# Patient Record
Sex: FEMALE | Race: WHITE | NOT HISPANIC OR LATINO | Employment: UNEMPLOYED | URBAN - METROPOLITAN AREA
[De-identification: names, ages, dates, MRNs, and addresses within clinical notes are randomized per-mention and may not be internally consistent; named-entity substitution may affect disease eponyms.]

---

## 2018-09-30 ENCOUNTER — OFFICE VISIT (OUTPATIENT)
Dept: URGENT CARE | Facility: CLINIC | Age: 23
End: 2018-09-30
Payer: COMMERCIAL

## 2018-09-30 VITALS
RESPIRATION RATE: 18 BRPM | HEIGHT: 64 IN | TEMPERATURE: 98.9 F | WEIGHT: 185 LBS | BODY MASS INDEX: 31.58 KG/M2 | DIASTOLIC BLOOD PRESSURE: 67 MMHG | SYSTOLIC BLOOD PRESSURE: 116 MMHG | OXYGEN SATURATION: 99 % | HEART RATE: 100 BPM

## 2018-09-30 DIAGNOSIS — B34.9 VIRAL SYNDROME: Primary | ICD-10-CM

## 2018-09-30 PROCEDURE — 99213 OFFICE O/P EST LOW 20 MIN: CPT | Performed by: EMERGENCY MEDICINE

## 2018-09-30 RX ORDER — LEVOTHYROXINE SODIUM 112 UG/1
112 TABLET ORAL DAILY
COMMUNITY

## 2018-09-30 RX ORDER — QUETIAPINE FUMARATE 200 MG/1
250 TABLET, FILM COATED ORAL
COMMUNITY

## 2018-09-30 RX ORDER — ALBUTEROL SULFATE 90 UG/1
2 AEROSOL, METERED RESPIRATORY (INHALATION) EVERY 6 HOURS PRN
COMMUNITY
End: 2018-10-10 | Stop reason: SDUPTHER

## 2018-09-30 RX ORDER — ONDANSETRON 4 MG/1
4 TABLET, FILM COATED ORAL EVERY 8 HOURS PRN
Qty: 20 TABLET | Refills: 0 | Status: SHIPPED | OUTPATIENT
Start: 2018-09-30 | End: 2019-11-13

## 2018-09-30 RX ORDER — NORGESTIMATE AND ETHINYL ESTRADIOL 0.25-0.035
1 KIT ORAL DAILY
COMMUNITY

## 2018-09-30 RX ORDER — CITALOPRAM 20 MG/1
40 TABLET ORAL DAILY
COMMUNITY

## 2018-09-30 RX ORDER — CETIRIZINE HYDROCHLORIDE 10 MG/1
10 TABLET ORAL DAILY
COMMUNITY
End: 2019-11-13

## 2018-09-30 RX ORDER — PREDNISONE 10 MG/1
TABLET ORAL
Qty: 27 TABLET | Refills: 0 | Status: SHIPPED | OUTPATIENT
Start: 2018-09-30 | End: 2018-10-10

## 2018-09-30 NOTE — PROGRESS NOTES
[facility]        NAME: Franki Su is a 25 y o  female  : 1995    MRN: [id number]  DATE: 2018  TIME: 10:46 AM    Assessment and Plan   Viral syndrome [B34 9]  1  Viral syndrome  ondansetron (ZOFRAN) 4 mg tablet    predniSONE 10 mg tablet         Patient Instructions     Patient Instructions   You have been diagnosed with a Viral Syndrome infection and your symptoms should resolve over the next 7 to 10 days with the treatments recommended today  If they do not, it is possible that you have developed a bacterial infection and you should return  If you were to take the antibiotic while you are still in the viral stage, you will not get better any faster, but could kill off the good germs in your body as well as make the germs in you resistant to the antibiotic  Take an expectorant - guaifenesin should be the only ingredient - during the day, and the cough suppressant (ex  Robitussin DM or Tessalon) if needed at night only  Take Zinc 12 5 to 15 mg every 2 - 3 hrs while awake for the next few days  You may take Cold Kevin (13 3 mg of Zinc) or split a 25 mg Zinc tablet or lozenge in two or a 50 mg into four to get the proper dose  The total daily dose of Zinc should exceed 75 mg per day  Viral Syndrome   AMBULATORY CARE:   Viral syndrome  is a term used for general symptoms of a viral infection that has no clear cause  Viruses are spread easily from person to person through the air and on shared items  Common symptoms include the following:   · Fever and chills    · A runny or stuffy nose     · Cough, sore throat, or hoarseness     · Headache, or pain and pressure around your eyes     · Muscle aches and joint pain     · Shortness of breath or wheezing     · Abdominal pain, cramps, and diarrhea     · Nausea, vomiting, or loss of appetite  Call 911 for the following:   · You have a seizure  · You cannot be woken  · You have chest pain or trouble breathing    Seek care immediately if:   · You have a stiff neck, a bad headache, and sensitivity to light  · You feel weak, dizzy, or confused  · You stop urinating or urinate a lot less than normal      · You cough up blood or thick, yellow or green, mucus  · You have severe abdominal pain or your abdomen is larger than usual   Contact your healthcare provider if:   · Your symptoms do not get better with treatment, or get worse, after 3 days  · You have a rash or ear pain  · You have burning when you urinate  · You have questions or concerns about your condition or care  Treatment for viral syndrome  may include medicines to manage your symptoms  You may  need any of the following:  · Acetaminophen  decreases pain and fever  It is available without a doctor's order  Ask how much medicine to take and how often to take it  Follow directions  Acetaminophen can cause liver damage if not taken correctly  · NSAIDs , such as ibuprofen, help decrease swelling, pain, and fever  NSAIDs can cause stomach bleeding or kidney problems in certain people  If you take blood thinner medicine, always ask your healthcare provider if NSAIDs are safe for you  Always read the medicine label and follow directions  · Cold medicine  helps decrease swelling, control a cough, and relieve chest or nasal congestion  · Saline nasal spray  helps decrease nasal congestion  · Take your medicine as directed  Contact your healthcare provider if you think your medicine is not helping or if you have side effects  Tell him of her if you are allergic to any medicine  Keep a list of the medicines, vitamins, and herbs you take  Include the amounts, and when and why you take them  Bring the list or the pill bottles to follow-up visits  Carry your medicine list with you in case of an emergency  Manage your symptoms:   · Drink liquids as directed  to prevent dehydration   Ask how much liquid to drink each day and which liquids are best for you  Ask if you should drink an oral rehydration solution (ORS)  An ORS has the right amounts of water, salts, and sugar you need to replace body fluids  This may help prevent dehydration caused by vomiting or diarrhea  Do not drink liquids with caffeine  Drinks with caffeine can make dehydration worse  · Get plenty of rest  to help your body heal  Take naps throughout the day  Ask your healthcare provider when you can return to work and your normal activities  · Use a cool mist humidifier  to help you breathe easier if you have nasal or chest congestion  Ask your healthcare provider how to use a cool mist humidifier  · Eat honey or use cough drops  to help decrease throat discomfort  Ask your healthcare provider how much honey you should eat each day  Cough drops are available without a doctor's order  Follow directions for taking cough drops  · Do not smoke and stay away from others who smoke  Nicotine and other chemicals in cigarettes and cigars can cause lung damage  Smoking can also delay healing  Ask your healthcare provider for information if you currently smoke and need help to quit  E-cigarettes or smokeless tobacco still contain nicotine  Talk to your healthcare provider before you use these products  · Wash your hands frequently  to prevent the spread of germs to others  Use soap and water  Use gel hand  when soap and water are not available  Wash your hands after you use the bathroom, cough, or sneeze  Wash your hands before you prepare or eat food  Follow up with your healthcare provider as directed:  Write down your questions so you remember to ask them during your visits  © 2017 [employer] [employer] Information is for End User's use only and may not be sold, redistributed or otherwise used for commercial purposes  All illustrations and images included in CareNotes® are the copyrighted property of [employer]  or Jason Delong    The above information is an  only  It is not intended as medical advice for individual conditions or treatments  Talk to your doctor, nurse or pharmacist before following any medical regimen to see if it is safe and effective for you  Follow up with PCP in 3-5 days  Proceed to  ER if symptoms worsen  Chief Complaint     Chief Complaint   Patient presents with    Vomiting     vomiting and diarrhea for 1 week with cough         History of Present Illness       Patient with cough congestion for the past week also with nausea vomiting diarrhea on and off for the past week  Review of Systems   Review of Systems   Constitutional: Negative for chills and fever  HENT: Positive for congestion, rhinorrhea, sinus pressure and sore throat  Negative for trouble swallowing and voice change  Respiratory: Positive for cough  Negative for chest tightness, shortness of breath and wheezing  Cardiovascular: Negative for chest pain  Gastrointestinal: Positive for diarrhea, nausea and vomiting  Negative for abdominal distention, abdominal pain and blood in stool  Skin: Negative for rash  Neurological: Negative for headaches           Current Medications       Current Outpatient Prescriptions:     albuterol (PROVENTIL HFA,VENTOLIN HFA) 90 mcg/act inhaler, Inhale 2 puffs every 6 (six) hours as needed for wheezing, Disp: , Rfl:     cetirizine (ZyrTEC) 10 mg tablet, Take 10 mg by mouth daily, Disp: , Rfl:     citalopram (CeleXA) 40 mg tablet, Take 40 mg by mouth daily, Disp: , Rfl:     levothyroxine 100 mcg tablet, Take 100 mcg by mouth daily, Disp: , Rfl:     norgestimate-ethinyl estradiol (ORTHO-CYCLEN) 0 25-35 MG-MCG per tablet, Take 1 tablet by mouth daily, Disp: , Rfl:     QUEtiapine (SEROquel) 100 mg tablet, Take 100 mg by mouth daily at bedtime, Disp: , Rfl:     ondansetron (ZOFRAN) 4 mg tablet, Take 1 tablet (4 mg total) by mouth every 8 (eight) hours as needed for nausea or vomiting, Disp: 20 tablet, Rfl: 0    predniSONE 10 mg tablet, Take once daily all days pills on this schedule 6- 6- 5- 4- 3- 2- 1, Disp: 27 tablet, Rfl: 0    Current Allergies     Allergies as of 09/30/2018    (No Known Allergies)            The following portions of the patient's history were reviewed and updated as appropriate: allergies, current medications, past family history, past medical history, past social history, past surgical history and problem list      Past Medical History:   Diagnosis Date    Asthma     Bipolar 1 disorder ([facility] [address] )     Depression     Disease of thyroid gland     Nerve disorder        Past Surgical History:   Procedure Laterality Date    WISDOM TOOTH EXTRACTION         No family history on file  Medications have been verified  Objective   /67   Pulse 100   Temp 98 9 °F (37 2 °C) (Tympanic)   Resp 18   Ht 5' 4" (1 626 m)   Wt 83 9 kg (185 lb)   SpO2 99%   BMI 31 76 kg/m²        Physical Exam     Physical Exam   Constitutional: She is oriented to person, place, and time  She appears well-developed and well-nourished  No distress  HENT:   Head: Normocephalic and atraumatic  Right Ear: Tympanic membrane and external ear normal    Left Ear: Tympanic membrane and external ear normal    Nose: Mucosal edema present  Mouth/Throat: Posterior oropharyngeal erythema present  No oropharyngeal exudate or tonsillar abscesses  Neck: Neck supple  Cardiovascular: Normal rate and regular rhythm  Pulmonary/Chest: Effort normal    Abdominal: Soft  Bowel sounds are normal  She exhibits no distension and no mass  There is no tenderness  There is no rebound and no guarding  Neurological: She is alert and oriented to person, place, and time  Skin: Skin is warm and dry  No rash noted  No pallor  Nursing note and vitals reviewed

## 2018-09-30 NOTE — PATIENT INSTRUCTIONS
You have been diagnosed with a Viral Syndrome infection and your symptoms should resolve over the next 7 to 10 days with the treatments recommended today  If they do not, it is possible that you have developed a bacterial infection and you should return  If you were to take the antibiotic while you are still in the viral stage, you will not get better any faster, but could kill off the good germs in your body as well as make the germs in you resistant to the antibiotic  Take an expectorant - guaifenesin should be the only ingredient - during the day, and the cough suppressant (ex  Robitussin DM or Tessalon) if needed at night only  Take Zinc 12 5 to 15 mg every 2 - 3 hrs while awake for the next few days  You may take Cold Kevin (13 3 mg of Zinc) or split a 25 mg Zinc tablet or lozenge in two or a 50 mg into four to get the proper dose  The total daily dose of Zinc should exceed 75 mg per day  Viral Syndrome   AMBULATORY CARE:   Viral syndrome  is a term used for general symptoms of a viral infection that has no clear cause  Viruses are spread easily from person to person through the air and on shared items  Common symptoms include the following:   · Fever and chills    · A runny or stuffy nose     · Cough, sore throat, or hoarseness     · Headache, or pain and pressure around your eyes     · Muscle aches and joint pain     · Shortness of breath or wheezing     · Abdominal pain, cramps, and diarrhea     · Nausea, vomiting, or loss of appetite  Call 911 for the following:   · You have a seizure  · You cannot be woken  · You have chest pain or trouble breathing  Seek care immediately if:   · You have a stiff neck, a bad headache, and sensitivity to light  · You feel weak, dizzy, or confused  · You stop urinating or urinate a lot less than normal      · You cough up blood or thick, yellow or green, mucus       · You have severe abdominal pain or your abdomen is larger than usual   Contact your healthcare provider if:   · Your symptoms do not get better with treatment, or get worse, after 3 days  · You have a rash or ear pain  · You have burning when you urinate  · You have questions or concerns about your condition or care  Treatment for viral syndrome  may include medicines to manage your symptoms  You may  need any of the following:  · Acetaminophen  decreases pain and fever  It is available without a doctor's order  Ask how much medicine to take and how often to take it  Follow directions  Acetaminophen can cause liver damage if not taken correctly  · NSAIDs , such as ibuprofen, help decrease swelling, pain, and fever  NSAIDs can cause stomach bleeding or kidney problems in certain people  If you take blood thinner medicine, always ask your healthcare provider if NSAIDs are safe for you  Always read the medicine label and follow directions  · Cold medicine  helps decrease swelling, control a cough, and relieve chest or nasal congestion  · Saline nasal spray  helps decrease nasal congestion  · Take your medicine as directed  Contact your healthcare provider if you think your medicine is not helping or if you have side effects  Tell him of her if you are allergic to any medicine  Keep a list of the medicines, vitamins, and herbs you take  Include the amounts, and when and why you take them  Bring the list or the pill bottles to follow-up visits  Carry your medicine list with you in case of an emergency  Manage your symptoms:   · Drink liquids as directed  to prevent dehydration  Ask how much liquid to drink each day and which liquids are best for you  Ask if you should drink an oral rehydration solution (ORS)  An ORS has the right amounts of water, salts, and sugar you need to replace body fluids  This may help prevent dehydration caused by vomiting or diarrhea  Do not drink liquids with caffeine  Drinks with caffeine can make dehydration worse       · Get plenty of rest  to help your body heal  Take naps throughout the day  Ask your healthcare provider when you can return to work and your normal activities  · Use a cool mist humidifier  to help you breathe easier if you have nasal or chest congestion  Ask your healthcare provider how to use a cool mist humidifier  · Eat honey or use cough drops  to help decrease throat discomfort  Ask your healthcare provider how much honey you should eat each day  Cough drops are available without a doctor's order  Follow directions for taking cough drops  · Do not smoke and stay away from others who smoke  Nicotine and other chemicals in cigarettes and cigars can cause lung damage  Smoking can also delay healing  Ask your healthcare provider for information if you currently smoke and need help to quit  E-cigarettes or smokeless tobacco still contain nicotine  Talk to your healthcare provider before you use these products  · Wash your hands frequently  to prevent the spread of germs to others  Use soap and water  Use gel hand  when soap and water are not available  Wash your hands after you use the bathroom, cough, or sneeze  Wash your hands before you prepare or eat food  Follow up with your healthcare provider as directed:  Write down your questions so you remember to ask them during your visits  © 2017 [facility] [employer] Information is for End User's use only and may not be sold, redistributed or otherwise used for commercial purposes  All illustrations and images included in CareNotes® are the copyrighted property of [employer]  or Jason Delong  The above information is an  only  It is not intended as medical advice for individual conditions or treatments  Talk to your doctor, nurse or pharmacist before following any medical regimen to see if it is safe and effective for you

## 2018-10-10 ENCOUNTER — OFFICE VISIT (OUTPATIENT)
Dept: URGENT CARE | Facility: CLINIC | Age: 23
End: 2018-10-10
Payer: COMMERCIAL

## 2018-10-10 VITALS
WEIGHT: 187 LBS | HEART RATE: 112 BPM | TEMPERATURE: 98 F | OXYGEN SATURATION: 95 % | SYSTOLIC BLOOD PRESSURE: 123 MMHG | HEIGHT: 64 IN | BODY MASS INDEX: 31.92 KG/M2 | RESPIRATION RATE: 16 BRPM | DIASTOLIC BLOOD PRESSURE: 78 MMHG

## 2018-10-10 DIAGNOSIS — J40 BRONCHITIS: Primary | ICD-10-CM

## 2018-10-10 PROCEDURE — 99213 OFFICE O/P EST LOW 20 MIN: CPT | Performed by: PHYSICIAN ASSISTANT

## 2018-10-10 RX ORDER — DOXYCYCLINE 100 MG/1
100 TABLET ORAL 2 TIMES DAILY
Qty: 20 TABLET | Refills: 0 | Status: SHIPPED | OUTPATIENT
Start: 2018-10-10 | End: 2018-10-20

## 2018-10-10 RX ORDER — FLUTICASONE PROPIONATE 110 UG/1
2 AEROSOL, METERED RESPIRATORY (INHALATION) AS NEEDED
COMMUNITY

## 2018-10-10 RX ORDER — ALBUTEROL SULFATE 90 UG/1
2 AEROSOL, METERED RESPIRATORY (INHALATION) EVERY 4 HOURS PRN
Qty: 1 INHALER | Refills: 0 | Status: SHIPPED | OUTPATIENT
Start: 2018-10-10 | End: 2018-10-20

## 2018-10-10 NOTE — LETTER
October 10, 2018     Patient: Merrill Gonzalez   YOB: 1995   Date of Visit: 10/10/2018       To Whom It May Concern: It is my medical opinion that Merrill Gonzalez may return to work on evening of 10/12/2018       If you have any questions or concerns, please don't hesitate to call           Sincerely,        HUBERT VELAZQUEZ    CC: No Recipients

## 2018-10-10 NOTE — PROGRESS NOTES
[facility] [employer] [facility]        NAME: Mela Abbott is a 25 y o  female  : 1995    MRN: [id number]  DATE: October 10, 2018  TIME: 8:45 AM    Assessment and Plan   Bronchitis [J40]  1  Bronchitis       Patient Instructions     Increase dose of flovent to two puffs twice a day  Take antibiotic as prescribed  Albuterol inhaler [id number]  Follow up with PCP in 3-5 days  Proceed to  ER if symptoms worsen  Chief Complaint     Chief Complaint   Patient presents with    Sinusitis     c/o severe sinus congestion  coughing from post nasal drip  has been sick for 2 weeks  seen on  and dx viral syndrome  feeling worse  History of Present Illness       Cough   This is a new problem  The current episode started yesterday  The problem has been gradually worsening  The problem occurs every few minutes  The cough is non-productive  Associated symptoms include nasal congestion, rhinorrhea, a sore throat and wheezing  Pertinent negatives include no chest pain, chills, ear congestion, ear pain, fever, headaches, heartburn, hemoptysis, myalgias, postnasal drip, rash, shortness of breath, sweats or weight loss  Nothing aggravates the symptoms  She has tried nothing for the symptoms  There is no history of asthma, bronchiectasis, bronchitis, COPD, emphysema, environmental allergies or pneumonia  Review of Systems   Review of Systems   Constitutional: Negative for chills, fever and weight loss  HENT: Positive for rhinorrhea, sinus pressure and sore throat  Negative for ear pain and postnasal drip  Respiratory: Positive for cough, chest tightness and wheezing  Negative for hemoptysis and shortness of breath  Cardiovascular: Negative for chest pain  Gastrointestinal: Negative for heartburn  Musculoskeletal: Negative for myalgias  Skin: Negative for rash  Allergic/Immunologic: Negative for environmental allergies  Neurological: Negative for headaches           Current Medications       Current Outpatient Prescriptions:     albuterol (PROVENTIL HFA,VENTOLIN HFA) 90 mcg/act inhaler, Inhale 2 puffs every 6 (six) hours as needed for wheezing, Disp: , Rfl:     cetirizine (ZyrTEC) 10 mg tablet, Take 10 mg by mouth daily, Disp: , Rfl:     citalopram (CeleXA) 40 mg tablet, Take 40 mg by mouth daily, Disp: , Rfl:     fluticasone (FLOVENT HFA) 110 MCG/ACT inhaler, Inhale 2 puffs 2 (two) times a day Rinse mouth after use , Disp: , Rfl:     levothyroxine 100 mcg tablet, Take 100 mcg by mouth daily, Disp: , Rfl:     norgestimate-ethinyl estradiol (ORTHO-CYCLEN) 0 25-35 MG-MCG per tablet, Take 1 tablet by mouth daily, Disp: , Rfl:     ondansetron (ZOFRAN) 4 mg tablet, Take 1 tablet (4 mg total) by mouth every 8 (eight) hours as needed for nausea or vomiting, Disp: 20 tablet, Rfl: 0    QUEtiapine (SEROquel) 100 mg tablet, Take 100 mg by mouth daily at bedtime, Disp: , Rfl:     Current Allergies     Allergies as of 10/10/2018    (No Known Allergies)            The following portions of the patient's history were reviewed and updated as appropriate: allergies, current medications, past family history, past medical history, past social history, past surgical history and problem list      Past Medical History:   Diagnosis Date    Asthma     Bipolar 1 disorder ([address] )     Depression     Disease of thyroid gland     Nerve disorder        Past Surgical History:   Procedure Laterality Date    WISDOM TOOTH EXTRACTION         Family History   Problem Relation Age of Onset    Pneumonia Mother     Multiple sclerosis Father          Medications have been verified  Objective   /78   Pulse (!) 112   Temp 98 °F (36 7 °C) (Tympanic)   Resp 16   Ht 5' 4" (1 626 m)   Wt 84 8 kg (187 lb)   SpO2 95%   BMI 32 10 kg/m²        Physical Exam     Physical Exam   Constitutional: She appears well-developed and well-nourished  HENT:   Head: Normocephalic     Right Ear: External ear normal    Left Ear: External ear normal    Nose: Mucosal edema and rhinorrhea present  Mouth/Throat: Posterior oropharyngeal edema and posterior oropharyngeal erythema present  No oropharyngeal exudate  Cardiovascular: Normal rate, regular rhythm, normal heart sounds and intact distal pulses  Exam reveals no gallop and no friction rub  No murmur heard  Pulmonary/Chest: Effort normal  No respiratory distress  She has no decreased breath sounds  She has wheezes  She has no rhonchi  She has no rales  Abdominal: Soft  Bowel sounds are normal  She exhibits no distension  There is no tenderness  Lymphadenopathy:     She has cervical adenopathy  Right cervical: Superficial cervical adenopathy present  Left cervical: Superficial cervical adenopathy present

## 2019-06-26 ENCOUNTER — APPOINTMENT (OUTPATIENT)
Dept: RADIOLOGY | Facility: MEDICAL CENTER | Age: 24
End: 2019-06-26
Payer: COMMERCIAL

## 2019-06-26 ENCOUNTER — OFFICE VISIT (OUTPATIENT)
Dept: OBGYN CLINIC | Facility: CLINIC | Age: 24
End: 2019-06-26
Payer: COMMERCIAL

## 2019-06-26 VITALS
WEIGHT: 189 LBS | HEIGHT: 64 IN | DIASTOLIC BLOOD PRESSURE: 74 MMHG | BODY MASS INDEX: 32.27 KG/M2 | SYSTOLIC BLOOD PRESSURE: 104 MMHG | HEART RATE: 128 BPM

## 2019-06-26 DIAGNOSIS — S83.004A CLOSED PATELLAR DISLOCATION, RIGHT, INITIAL ENCOUNTER: Primary | ICD-10-CM

## 2019-06-26 DIAGNOSIS — M25.561 ACUTE PAIN OF RIGHT KNEE: ICD-10-CM

## 2019-06-26 PROCEDURE — 73562 X-RAY EXAM OF KNEE 3: CPT

## 2019-06-26 PROCEDURE — 99203 OFFICE O/P NEW LOW 30 MIN: CPT | Performed by: ORTHOPAEDIC SURGERY

## 2019-07-08 ENCOUNTER — EVALUATION (OUTPATIENT)
Dept: PHYSICAL THERAPY | Facility: CLINIC | Age: 24
End: 2019-07-08
Payer: COMMERCIAL

## 2019-07-08 DIAGNOSIS — R26.2 DIFFICULTY WALKING: ICD-10-CM

## 2019-07-08 DIAGNOSIS — M25.561 ACUTE PAIN OF RIGHT KNEE: ICD-10-CM

## 2019-07-08 DIAGNOSIS — S83.004A CLOSED PATELLAR DISLOCATION, RIGHT, INITIAL ENCOUNTER: Primary | ICD-10-CM

## 2019-07-08 PROCEDURE — 97535 SELF CARE MNGMENT TRAINING: CPT | Performed by: PHYSICAL THERAPIST

## 2019-07-08 PROCEDURE — 97162 PT EVAL MOD COMPLEX 30 MIN: CPT | Performed by: PHYSICAL THERAPIST

## 2019-07-08 NOTE — LETTER
2019    Anne-Marie Garcia MD  [address], [address]    Patient: Renea Florentino   YOB: 1995   Date of Visit: 2019     Encounter Diagnosis     ICD-10-CM    1  Closed patellar dislocation, right, initial encounter O07 924D Ambulatory referral to PT/OT hand therapy   2  Difficulty walking R26 2    3  Acute pain of right knee M25 561        Dear Dr Tashia Mckeon:    Please review the attached Plan of Care from [facility] recent visit  Please verify that you agree therapy should continue by signing the attached document and sending it back to our office  If you have any questions or concerns, please don't hesitate to call  Sincerely,    Marquis Mensah, PT      Referring Provider:      I certify that I have read the below Plan of Care and certify the need for these services furnished under this plan of treatment while under my care  Anne-Marie Garcia MD  [address] [place] [address]  [facility]IA [facility]          PT Evaluation   Today's date: 2019  Patient name: Reena Florentino  : 1995  MRN: [id number]  Referring provider: Brian Conner MD  Dx:   Encounter Diagnosis     ICD-10-CM    1  Closed patellar dislocation, right, initial encounter N29 [id number] Ambulatory referral to PT/OT hand therapy   2  Difficulty walking R26 2    3  Acute pain of right knee M25 561      Assessment  Assessment details: Patient states her right leg will just give out  Her patella acts up and she can almost fall from the pain  Impairments: abnormal gait, abnormal or restricted ROM, abnormal movement, activity intolerance, impaired balance, impaired physical strength, pain with function and safety issue  Understanding of Dx/Px/POC: excellent   Prognosis: fair    Goals  STG 2-4 weeks:    Increase B LE strength 2-5 lbs  Decrease pain by 1-2 levels on 1-10 pain scale  Increase standing/walking tolerance to >30 minutes  Patient independent with HEP    LTG 6-8 weeks:   Increase B LE strength 10-20 lbs  Decrease pain to 0-2/10 with activity  Increase single leg stance >30 seconds  Increase standing/walking tolerance to >90 minutes  Increase range of motion to normal   Improve gait pattern, coordination and balance to normal   D/C with HEP  Plan  Patient would benefit from: PT eval and skilled physical therapy  Planned modality interventions: ultrasound, TENS and unattended electrical stimulation  Planned therapy interventions: joint mobilization, manual therapy, balance, balance/weight bearing training, neuromuscular re-education, patient education, postural training, prosthetic fitting/training, self care, strengthening, stretching, therapeutic activities, therapeutic exercise, therapeutic training, transfer training, home exercise program, graded exercise, flexibility and coordination  Frequency: 3x week  Duration in weeks: 6  Treatment plan discussed with: patient      Subjective Evaluation    Pain  Quality: discomfort, knife-like, pulling, squeezing, sharp, radiating, throbbing and tight    Treatments  Current treatment: physical therapy  Patient Goals  Patient goals for therapy: decreased pain, improved balance, increased motion, return to work, return to [employer] activities, independence with ADLs/IADLs and increased strength        Objective    Date of onset:  07/04/2019    Date of Surgery:  None    History of Present Episode: 7/8/2019  Christiano العليbryce states that at age 15 she apparently dislocated her right knee patella  She has had issues ever since this incident  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  Past Medical History:    7/8/2019  Waco Maxine reports chronic right knee / patellar issues  Asthma, Hypothyroid  Back Sx Lumbar L4-5  Previous Level of Functional Ability:  7/8/2019  Christiano Goodman states her right knee was fine until it dislocated  Inspection / Palpation:  Knee:  7/8/2019  Mesomorphic / Endomorphic body type  No signs of infection  No signs of wounds  No signs of drainage  No signs of ecchymotic regions  No signs of erythremic regions  Moderate signs of muscle spasm  Moderate signs of muscle guarding  Moderate signs of tenderness reported to palpation  No signs of swelling  Positive signs of a surgery site with her lumbar region  Current conditions appears consistent with recent acute episode  Chief Complaints:  7/8/2019  Sheridan Jo reports moderate difficulty with standing  Sheridan Jo reports moderate difficulty with walking  Sheridan Jo reports moderate difficulty with movement / use of her right knee  Sheridan Jo reports moderate difficulty with use of stairs  Sheridan Jo reports severe difficulty with running  Sheridan Jo reports severe difficulty with jumping  Sheridan Jo reports moderate difficulty with use of inclines  Sheridan Jo reports mild difficulty with sleeping  Sheridan Jo reports moderate difficulty with her strength and endurance  Sheridan Jo reports mild to moderate limitations with her range of motion  Sheridan Jo reports mild difficulty lying on her right knee region  KNEE PAIN Resting Moving Palpation Standing Walking   7/8/2019 Lt 0 0 0 0 0   7/8/2019 Rt 3-4 3-5 5-8 3-4 6-8     KNEE PAIN Running Stairs Sleeping Twisting Jumping   7/8/2019 Lt 0 0 0 0 0   7/8/2019 Rt 8-10 5-8 1-3 9-10 9-10     KNEE AROM Flexion Extension SLR   7/8/2019 Lt 140° 0° 90°   7/8/2019 Rt 140° 0° 90°     KNEE MMT Flexion Extension Varus Stress Valgus Stress   7/8/2019 Lt 0/10  29 lbs 0/10  31 lbs 0/10  28 lbs 0/10  29 lbs   7/8/2019 Rt 6/10  12 lbs 7/10  10 lbs 5/10  9 lbs 5/10  9 lbs     Knee Screen MCL LCL ACL PCL   7/8/2019 Rt Negative Negative Negative Negative   7/8/2019 Lt Negative Negative Negative Negative     Knee Screen Patellar Quad Stress Meniscal Medial Meniscal Lateral   7/8/2019 Rt POSITIVE POSITIVE Negative Negative   7/8/2019 Lt Negative Negative Negative Negative     Precautions: Right Knee Patellar Dislocation / Knee Pain      Daily Treatment Log  Manual  7/8       MT, ROM 15'       HEP 15'       Exercise Log 7/8       Balance Board        Chair Squats        P-Bar-GT-Forward, Backward,Side-Even & Dips        BOSU-Walk        Foam Pad SLR,Hip/KneeFl,Step Ups        Foam Beam        Fitter-Slalom        Monster Steps        BAPS- L-2        T/G-Squats PF        W/P-Hip-Abd,Add,Flex,Ext        WP-Squats        Trimax-TKE        Pethhce-HS-As        NK Table Exer        NK Table ROM        TM        Stepper        Bike        ME, PE 15'               Modalities 7/8       MH&ES        US

## 2019-07-08 NOTE — PROGRESS NOTES
PT Evaluation   Today's date: 2019  Patient name: Danielle Mata  : 1995  MRN: [id number]  Referring provider: Zay Chan MD  Dx:   Encounter Diagnosis     ICD-10-CM    1  Closed patellar dislocation, right, initial encounter U13 647Q Ambulatory referral to PT/OT hand therapy   2  Difficulty walking R26 2    3  Acute pain of right knee M25 561      Assessment  Assessment details: Patient states her right leg will just give out  Her patella acts up and she can almost fall from the pain  Impairments: abnormal gait, abnormal or restricted ROM, abnormal movement, activity intolerance, impaired balance, impaired physical strength, pain with function and safety issue  Understanding of Dx/Px/POC: excellent   Prognosis: fair    Goals  STG 2-4 weeks:    Increase B LE strength 2-5 lbs  Decrease pain by 1-2 levels on 1-10 pain scale  Increase standing/walking tolerance to >30 minutes  Patient independent with HEP  LTG 6-8 weeks:   Increase B LE strength 10-20 lbs  Decrease pain to 0-2/10 with activity  Increase single leg stance >30 seconds  Increase standing/walking tolerance to >90 minutes  Increase range of motion to normal   Improve gait pattern, coordination and balance to normal   D/C with HEP        Plan  Patient would benefit from: PT eval and skilled physical therapy  Planned modality interventions: ultrasound, TENS and unattended electrical stimulation  Planned therapy interventions: joint mobilization, manual therapy, balance, balance/weight bearing training, neuromuscular re-education, patient education, postural training, prosthetic fitting/training, self care, strengthening, stretching, therapeutic activities, therapeutic exercise, therapeutic training, transfer training, home exercise program, graded exercise, flexibility and coordination  Frequency: 3x week  Duration in weeks: 6  Treatment plan discussed with: patient      Subjective Evaluation    Pain  Quality: discomfort, knife-like, pulling, squeezing, sharp, radiating, throbbing and tight    Treatments  Current treatment: physical therapy  Patient Goals  Patient goals for therapy: decreased pain, improved balance, increased motion, return to work, return to [employer] activities, independence with ADLs/IADLs and increased strength        Objective    Date of onset:  07/04/2019    Date of Surgery:  None    History of Present Episode: 7/8/2019  Joana Aguilar states that at age 15 she apparently dislocated her right knee patella  She has had issues ever since this incident  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  Past Medical History:    7/8/2019  Joanarohan Aguilar reports chronic right knee / patellar issues  Asthma, Hypothyroid  Back Sx Lumbar L4-5  Previous Level of Functional Ability:  7/8/2019  Joana Aguilar states her right knee was fine until it dislocated  Inspection / Palpation:  Knee:  7/8/2019  Mesomorphic / Endomorphic body type  No signs of infection  No signs of wounds  No signs of drainage  No signs of ecchymotic regions  No signs of erythremic regions  Moderate signs of muscle spasm  Moderate signs of muscle guarding  Moderate signs of tenderness reported to palpation  No signs of swelling  Positive signs of a surgery site with her lumbar region  Current conditions appears consistent with recent acute episode  Chief Complaints:  7/8/2019  Joana Aguilar reports moderate difficulty with standing  Joana Jeff reports moderate difficulty with walking  Joanamariano Aguilar reports moderate difficulty with movement / use of her right knee  Joanarohan Aguilar reports moderate difficulty with use of stairs  Joana Aguilar reports severe difficulty with running  Joana Aguilar reports severe difficulty with jumping  Joana Aguilar reports moderate difficulty with use of inclines  Joana Aguilar reports mild difficulty with sleeping  Joana Maliks reports moderate difficulty with her strength and endurance    oJana Aguilra reports mild to moderate limitations with her range of motion  Trey Zambrano reports mild difficulty lying on her right knee region  KNEE PAIN Resting Moving Palpation Standing Walking   7/8/2019 Lt 0 0 0 0 0   7/8/2019 Rt 3-4 3-5 5-8 3-4 6-8     KNEE PAIN Running Stairs Sleeping Twisting Jumping   7/8/2019 Lt 0 0 0 0 0   7/8/2019 Rt 8-10 5-8 1-3 9-10 9-10     KNEE AROM Flexion Extension SLR   7/8/2019 Lt 140° 0° 90°   7/8/2019 Rt 140° 0° 90°     KNEE MMT Flexion Extension Varus Stress Valgus Stress   7/8/2019 Lt 0/10  29 lbs 0/10  31 lbs 0/10  28 lbs 0/10  29 lbs   7/8/2019 Rt 6/10  12 lbs 7/10  10 lbs 5/10  9 lbs 5/10  9 lbs     Knee Screen MCL LCL ACL PCL   7/8/2019 Rt Negative Negative Negative Negative   7/8/2019 Lt Negative Negative Negative Negative     Knee Screen Patellar Quad Stress Meniscal Medial Meniscal Lateral   7/8/2019 Rt POSITIVE POSITIVE Negative Negative   7/8/2019 Lt Negative Negative Negative Negative     Precautions: Right Knee Patellar Dislocation / Knee Pain      Daily Treatment Log  Manual  7/8       MT, ROM 15'       HEP 15'       Exercise Log 7/8       Balance Board        Chair Squats        P-Bar-GT-Forward, Backward,Side-Even & Dips        BOSU-Walk        Foam Pad SLR,Hip/KneeFl,Step Ups        Foam Beam        Fitter-Slalom        Monster Steps        BAPS- L-2        T/G-Squats PF        W/P-Hip-Abd,Add,Flex,Ext        WP-Squats        Trimax-TKE        Psxjulx-WX-Qb        NK Table Exer        NK Table ROM        TM        Stepper        Bike        ME, PE 15'               Modalities 7/8       MH&ES        US

## 2019-07-09 ENCOUNTER — OFFICE VISIT (OUTPATIENT)
Dept: PHYSICAL THERAPY | Facility: CLINIC | Age: 24
End: 2019-07-09
Payer: COMMERCIAL

## 2019-07-09 DIAGNOSIS — R26.2 DIFFICULTY WALKING: ICD-10-CM

## 2019-07-09 DIAGNOSIS — M25.561 ACUTE PAIN OF RIGHT KNEE: ICD-10-CM

## 2019-07-09 DIAGNOSIS — S83.004A CLOSED PATELLAR DISLOCATION, RIGHT, INITIAL ENCOUNTER: Primary | ICD-10-CM

## 2019-07-09 PROCEDURE — 97140 MANUAL THERAPY 1/> REGIONS: CPT | Performed by: PHYSICAL THERAPIST

## 2019-07-09 PROCEDURE — 97116 GAIT TRAINING THERAPY: CPT | Performed by: PHYSICAL THERAPIST

## 2019-07-09 PROCEDURE — G0283 ELEC STIM OTHER THAN WOUND: HCPCS | Performed by: PHYSICAL THERAPIST

## 2019-07-09 PROCEDURE — 97035 APP MDLTY 1+ULTRASOUND EA 15: CPT | Performed by: PHYSICAL THERAPIST

## 2019-07-09 PROCEDURE — 97110 THERAPEUTIC EXERCISES: CPT | Performed by: PHYSICAL THERAPIST

## 2019-07-09 PROCEDURE — 97014 ELECTRIC STIMULATION THERAPY: CPT | Performed by: PHYSICAL THERAPIST

## 2019-07-09 NOTE — PROGRESS NOTES
Today's date: 2019  Patient name: Eduin Jiang  : 1995  MRN: [id number]  Referring provider: Alexander Boston MD  Dx:   Encounter Diagnosis     ICD-10-CM    1  Closed patellar dislocation, right, initial encounter S83 004A    2  Difficulty walking R26 2    3  Acute pain of right knee M25 561      Subjective:  Trey Zambrano states her knee cap is still very tender and hurts with use  Objective: See treatment log below    Assessment: Tolerated treatment well  Patient exhibited good technique with therapeutic exercises and would benefit from continued PT    Plan: Continue per plan of care  Progress treatment as tolerated  Precautions: Right Knee Patellar Dislocation / Knee Pain      Daily Treatment Log  Manual        MT, ROM 15' 15'      HEP 15'       Exercise Log       Balance Board  30x      Chair Squats        P-Bar-GT-Forward, Backward,Side-Even & Dips        BOSU-Walk  5'      Foam Pad SLR,Hip/KneeFl,Step Ups        Foam Beam        Fitter-Slalom        Monster Steps        BAPS- L-2        T/G-Squats PF        W/P-Hip-Abd,Add,Flex,Ext  5#-30x      WP-Squats        Trimax-TKE        Kehwtyr-UQ-Fp        NK Table Exer        NK Table ROM        TM        Iftikhar Gentile        ME, PE 15' 15'              Modalities       MH&ES  20'      US  10'

## 2019-07-16 ENCOUNTER — OFFICE VISIT (OUTPATIENT)
Dept: PHYSICAL THERAPY | Facility: CLINIC | Age: 24
End: 2019-07-16
Payer: COMMERCIAL

## 2019-07-16 DIAGNOSIS — S83.004A CLOSED PATELLAR DISLOCATION, RIGHT, INITIAL ENCOUNTER: Primary | ICD-10-CM

## 2019-07-16 DIAGNOSIS — R26.2 DIFFICULTY WALKING: ICD-10-CM

## 2019-07-16 DIAGNOSIS — M25.561 ACUTE PAIN OF RIGHT KNEE: ICD-10-CM

## 2019-07-16 PROCEDURE — 97110 THERAPEUTIC EXERCISES: CPT

## 2019-07-16 PROCEDURE — 97140 MANUAL THERAPY 1/> REGIONS: CPT

## 2019-07-16 PROCEDURE — 97014 ELECTRIC STIMULATION THERAPY: CPT

## 2019-07-16 PROCEDURE — G0283 ELEC STIM OTHER THAN WOUND: HCPCS

## 2019-07-16 NOTE — PROGRESS NOTES
Today's date: 2019  Patient name: Rock Carrillo  : 1995  MRN: [id number]  Referring provider: Santos Reyes MD  Dx:   Encounter Diagnosis     ICD-10-CM    1  Closed patellar dislocation, right, initial encounter S83 004A    2  Difficulty walking R26 2    3  Acute pain of right knee M25 561      Subjective:  No new c/o pain today  Objective: See treatment log below    Assessment: Tolerated treatment well  Patient exhibited good technique with therapeutic exercises and would benefit from continued PT to increase R LE strength/ROM and endurance to improve mobility and gait  Plan: Continue per plan of care  Progress treatment as tolerated  Precautions: Right Knee Patellar Dislocation / Knee Pain      Daily Treatment Log  Manual       MT, ROM 15' 15' 15'     HEP 15'       Exercise Log      Balance Board  30x 30x     P-Bar-GT-Forward, Backward,Side-Even & Dips        BOSU-Walk  5' 5'     Foam Pad SLR,Hip/KneeFl,Step Ups   30x     Foam Beam        Fitter-Jackie Samuelster Steps        BAPS- L-2        T/G-Squats PF   30x     W/P-Hip-Abd,Add,Flex,Ext  5#-30x 10#-30x     WP-Squats        Trimax-TKE        Qpfggwe-MW-Oi        NK Table Exer   5#-30x     NK Table ROM        Mauro PHAM, PE 15' 15' 15'             Modalities      MH&ES  20' 20'     US  10' NT

## 2019-07-17 ENCOUNTER — OFFICE VISIT (OUTPATIENT)
Dept: PHYSICAL THERAPY | Facility: CLINIC | Age: 24
End: 2019-07-17
Payer: COMMERCIAL

## 2019-07-17 DIAGNOSIS — M25.561 ACUTE PAIN OF RIGHT KNEE: ICD-10-CM

## 2019-07-17 DIAGNOSIS — S83.004A CLOSED PATELLAR DISLOCATION, RIGHT, INITIAL ENCOUNTER: Primary | ICD-10-CM

## 2019-07-17 DIAGNOSIS — R26.2 DIFFICULTY WALKING: ICD-10-CM

## 2019-07-17 PROCEDURE — G0283 ELEC STIM OTHER THAN WOUND: HCPCS | Performed by: PHYSICAL THERAPIST

## 2019-07-17 PROCEDURE — 97035 APP MDLTY 1+ULTRASOUND EA 15: CPT | Performed by: PHYSICAL THERAPIST

## 2019-07-17 PROCEDURE — 97014 ELECTRIC STIMULATION THERAPY: CPT | Performed by: PHYSICAL THERAPIST

## 2019-07-17 PROCEDURE — 97140 MANUAL THERAPY 1/> REGIONS: CPT | Performed by: PHYSICAL THERAPIST

## 2019-07-17 PROCEDURE — 97110 THERAPEUTIC EXERCISES: CPT | Performed by: PHYSICAL THERAPIST

## 2019-07-17 NOTE — PROGRESS NOTES
Today's date: 2019  Patient name: Renea Florentino  : 1995  MRN: [id number]  Referring provider: Brian Conner MD  Dx:   Encounter Diagnosis     ICD-10-CM    1  Closed patellar dislocation, right, initial encounter S83 004A    2  Difficulty walking R26 2    3  Acute pain of right knee M25 561      Subjective:  Sandeep Kate states her right knee is sore today but better than before  Objective: See treatment log below    Assessment: Tolerated treatment well  Patient exhibited good technique with therapeutic exercises and would benefit from continued PT    Plan: Continue per plan of care  Progress treatment as tolerated  Precautions: Right Knee Patellar Dislocation / Knee Pain      Daily Treatment Log  Manual      MT, ROM 15' 15' 15' 15'    HEP 15'       Exercise Log     Balance Board  30x 30x 30x    P-Bar-GT-Forward, Backward,Side-Even & Dips        BOSU-Walk  5' 5' 5'    Foam Pad SLR,Hip/KneeFl,Step Ups   30x 30x    Foam Beam        Fitter-Ousmanelom        Monster Steps        BAPS- L-2        T/G-Squats PF   30x 30x    W/P-Hip-Abd,Add,Flex,Ext  5#-30x 10#-30x 10#-30x    WP-Squats        Trimax-TKE        Iyubwgr-FB-Tb        NK Table Exer   5#-30x 5#-30x    NK Table ROM        Dufm Pride        ME, PE 15' 15' 15' 15'            Modalities     MH&ES  20' 20' 20'    US  10' NT 10'

## 2019-07-23 ENCOUNTER — OFFICE VISIT (OUTPATIENT)
Dept: PHYSICAL THERAPY | Facility: CLINIC | Age: 24
End: 2019-07-23
Payer: COMMERCIAL

## 2019-07-23 DIAGNOSIS — M25.561 ACUTE PAIN OF RIGHT KNEE: ICD-10-CM

## 2019-07-23 DIAGNOSIS — R26.2 DIFFICULTY WALKING: ICD-10-CM

## 2019-07-23 DIAGNOSIS — S83.004A CLOSED PATELLAR DISLOCATION, RIGHT, INITIAL ENCOUNTER: Primary | ICD-10-CM

## 2019-07-23 PROCEDURE — 97110 THERAPEUTIC EXERCISES: CPT | Performed by: PHYSICAL THERAPIST

## 2019-07-23 PROCEDURE — 97035 APP MDLTY 1+ULTRASOUND EA 15: CPT | Performed by: PHYSICAL THERAPIST

## 2019-07-23 PROCEDURE — 97014 ELECTRIC STIMULATION THERAPY: CPT | Performed by: PHYSICAL THERAPIST

## 2019-07-23 PROCEDURE — 97140 MANUAL THERAPY 1/> REGIONS: CPT | Performed by: PHYSICAL THERAPIST

## 2019-07-23 PROCEDURE — G0283 ELEC STIM OTHER THAN WOUND: HCPCS | Performed by: PHYSICAL THERAPIST

## 2019-07-23 NOTE — PROGRESS NOTES
Today's date: 2019  Patient name: Christina Poster  : 1995  MRN: [id number]  Referring provider: Jackie Pritchett MD  Dx:   Encounter Diagnosis     ICD-10-CM    1  Closed patellar dislocation, right, initial encounter S83 004A    2  Difficulty walking R26 2    3  Acute pain of right knee M25 561      Subjective:  Irvine states her right knee is still sore  Objective: See treatment log below    Assessment: Tolerated treatment well  Patient exhibited good technique with therapeutic exercises and would benefit from continued PT    Plan: Continue per plan of care  Progress treatment as tolerated  Precautions: Right Knee Patellar Dislocation / Knee Pain      Daily Treatment Log  Manual      MT, ROM 15'  15' 15'    HEP        Exercise Log     Balance Board 30x  30x 30x    P-Bar-GT-Forward, Backward,Side-Even & Dips        BOSU-Walk 5'  5' 5'    Foam Pad SLR,Hip/KneeFl,Step Ups 30x  30x 30x    Foam Beam        Fitter-Slalom        Monster Steps        BAPS- L-2        T/G-Squats PF 30x  30x 30x    W/P-Hip-Abd,Add,Flex,Ext 10#-30x  10#-30x 10#-30x    WP-Squats        Trimax-TKE        Npjnmkx-MW-Km        NK Table Exer 5#-30x  5#-30x 5#-30x    NK Table ROM        TM        Tisherman Singleton        ME, PE 15'  15' 15'            Modalities     MH&ES 20'  20' 20'    US 10'  NT 10'

## 2019-07-24 ENCOUNTER — OFFICE VISIT (OUTPATIENT)
Dept: PHYSICAL THERAPY | Facility: CLINIC | Age: 24
End: 2019-07-24
Payer: COMMERCIAL

## 2019-07-24 DIAGNOSIS — R26.2 DIFFICULTY WALKING: ICD-10-CM

## 2019-07-24 DIAGNOSIS — S83.004A CLOSED PATELLAR DISLOCATION, RIGHT, INITIAL ENCOUNTER: Primary | ICD-10-CM

## 2019-07-24 DIAGNOSIS — M25.561 ACUTE PAIN OF RIGHT KNEE: ICD-10-CM

## 2019-07-24 NOTE — PROGRESS NOTES
Today's date: 2019  Patient name: aJcek Damon  : 1995  MRN: [id number]  Referring provider: Yaritza Quintanilla MD  Dx:   Encounter Diagnosis     ICD-10-CM    1  Closed patellar dislocation, right, initial encounter S83 004A    2  Difficulty walking R26 2    3  Acute pain of right knee M25 561      Subjective:  ***    Objective: See treatment log below    Assessment: Tolerated treatment well  Patient exhibited good technique with therapeutic exercises and would benefit from continued PT to increase R LE ROM/strength and endurance to improve mobility and gait  Plan: Continue per plan of care  Progress treatment as tolerated  Precautions: Right Knee Patellar Dislocation / Knee Pain      Daily Treatment Log  Manual        MT, ROM 15'       HEP        Exercise Log       Balance Board 30x       P-Bar-GT-Forward, Backward,Side-Even & Dips        BOSU-Walk 5'       Foam Pad SLR,Hip/KneeFl,Step Ups 30x       Foam Beam        Fitter-Slalom        Monster Steps        BAPS- L-2        T/G-Squats PF 30x       W/P-Hip-Abd,Add,Flex,Ext 10#-30x       WP-Squats        Trimax-TKE        Sykpyxm-FZ-Xc        NK Table Exer 5#-30x       NK Table ROM        TM        Avie Crew        ME, PE 15' 15'              Modalities       MH&ES 20'       US 10'

## 2019-07-25 ENCOUNTER — OFFICE VISIT (OUTPATIENT)
Dept: PHYSICAL THERAPY | Facility: CLINIC | Age: 24
End: 2019-07-25
Payer: COMMERCIAL

## 2019-07-25 DIAGNOSIS — R26.2 DIFFICULTY WALKING: ICD-10-CM

## 2019-07-25 DIAGNOSIS — S83.004A CLOSED PATELLAR DISLOCATION, RIGHT, INITIAL ENCOUNTER: Primary | ICD-10-CM

## 2019-07-25 DIAGNOSIS — M25.561 ACUTE PAIN OF RIGHT KNEE: ICD-10-CM

## 2019-07-25 PROCEDURE — 97140 MANUAL THERAPY 1/> REGIONS: CPT

## 2019-07-25 PROCEDURE — 97110 THERAPEUTIC EXERCISES: CPT

## 2019-07-25 NOTE — PROGRESS NOTES
Today's date: 2019  Patient name: Loral Severance  : 1995  MRN: [id number]  Referring provider: Ayo Robin MD  Dx:   Encounter Diagnosis     ICD-10-CM    1  Closed patellar dislocation, right, initial encounter S83 004A    2  Difficulty walking R26 2    3  Acute pain of right knee M25 561      Subjective:  "My knee is sore and uncomfortable "    Objective: See treatment log below    Assessment: Tolerated treatment well  Patient exhibited good technique with therapeutic exercises and would benefit from continued PT to increase R LE ROM/strength and endurance to improve mobility and gait  Plan: Continue per plan of care  Progress treatment as tolerated  Precautions: Right Knee Patellar Dislocation / Knee Pain      Daily Treatment Log  Manual       MT, ROM 15'  15'     HEP        Exercise Log      Balance Board 30x  30x ea     P-Bar-GT-Forward, Backward,Side-Even & Dips        BOSU-Walk 5'       Foam Pad SLR,Hip/KneeFl,Step Ups 30x       Foam Beam        Fitter-Jackie Samuelster Steps        BAPS- L-2        T/G-Squats PF 30x  30x ea     W/P-Hip-Abd,Add,Flex,Ext 10#-30x       WP-Squats        Trimax-TKE        Ckajyfr-AU-Au        NK Table Exer 5#-30x       NK Table ROM        TM        Stepper        Bike   10'     [place], PE 15' 15' 15'             Modalities      MH&ES 20'  NT     US 10'

## 2019-07-30 ENCOUNTER — OFFICE VISIT (OUTPATIENT)
Dept: PHYSICAL THERAPY | Facility: CLINIC | Age: 24
End: 2019-07-30
Payer: COMMERCIAL

## 2019-07-30 DIAGNOSIS — M25.561 ACUTE PAIN OF RIGHT KNEE: ICD-10-CM

## 2019-07-30 DIAGNOSIS — R26.2 DIFFICULTY WALKING: ICD-10-CM

## 2019-07-30 DIAGNOSIS — S83.004A CLOSED PATELLAR DISLOCATION, RIGHT, INITIAL ENCOUNTER: Primary | ICD-10-CM

## 2019-07-30 PROCEDURE — 97014 ELECTRIC STIMULATION THERAPY: CPT

## 2019-07-30 PROCEDURE — 97140 MANUAL THERAPY 1/> REGIONS: CPT

## 2019-07-30 PROCEDURE — G0283 ELEC STIM OTHER THAN WOUND: HCPCS

## 2019-07-30 PROCEDURE — 97110 THERAPEUTIC EXERCISES: CPT

## 2019-07-30 NOTE — PROGRESS NOTES
Today's date: 2019  Patient name: Sharon Parham  : 1995  MRN: [id number]  Referring provider: Kell Cespedes MD  Dx:   Encounter Diagnosis     ICD-10-CM    1  Closed patellar dislocation, right, initial encounter S83 004A    2  Difficulty walking R26 2    3  Acute pain of right knee M25 561      Subjective:  "I'm just hurting all over today "    Objective: See treatment log below    Assessment: Tolerated treatment well  Patient exhibited good technique with therapeutic exercises and would benefit from continued PT to increase R LE ROM/strength and endurance to improve mobility and gait  Plan: Continue per plan of care  Progress treatment as tolerated  Precautions: Right Knee Patellar Dislocation / Knee Pain      Daily Treatment Log  Manual      MT, ROM 15'  15' 20'    HEP        Exercise Log     Balance Board 30x  30x ea 30x ea    P-Bar-GT-Forward, Backward,Side-Even & Dips        BOSU-Walk 5'   5'    Foam Pad SLR,Hip/KneeFl,Step Ups 30x   30x ea    Foam Beam        Fitter-Slalom        Monster Steps        BAPS- L-2        T/G-Squats PF 30x  30x ea 30x ea    W/P-Hip-Abd,Add,Flex,Ext 10#-30x       WP-Squats        Trimax-TKE        Inomavu-PE-Cg    2x2'    NK Table Exer 5#-30x       NK Table ROM        TM        Stepper        Bike   10' NT    ME, PE 15' 15' 15' 15'            Modalities     MH&ES 20'  NT 20'    US 10'

## 2019-08-01 ENCOUNTER — OFFICE VISIT (OUTPATIENT)
Dept: PHYSICAL THERAPY | Facility: CLINIC | Age: 24
End: 2019-08-01
Payer: COMMERCIAL

## 2019-08-01 DIAGNOSIS — S83.004A CLOSED PATELLAR DISLOCATION, RIGHT, INITIAL ENCOUNTER: Primary | ICD-10-CM

## 2019-08-01 DIAGNOSIS — R26.2 DIFFICULTY WALKING: ICD-10-CM

## 2019-08-01 DIAGNOSIS — M25.561 ACUTE PAIN OF RIGHT KNEE: ICD-10-CM

## 2019-08-01 PROCEDURE — G0283 ELEC STIM OTHER THAN WOUND: HCPCS | Performed by: PHYSICAL THERAPIST

## 2019-08-01 PROCEDURE — 97014 ELECTRIC STIMULATION THERAPY: CPT | Performed by: PHYSICAL THERAPIST

## 2019-08-01 PROCEDURE — 97110 THERAPEUTIC EXERCISES: CPT | Performed by: PHYSICAL THERAPIST

## 2019-08-01 PROCEDURE — 97140 MANUAL THERAPY 1/> REGIONS: CPT | Performed by: PHYSICAL THERAPIST

## 2019-08-01 NOTE — PROGRESS NOTES
Today's date: 2019  Patient name: Harriett Goodson  : 1995  MRN: [id number]  Referring provider: Torin Quigley MD  Dx:   Encounter Diagnosis     ICD-10-CM    1  Closed patellar dislocation, right, initial encounter S83 004A    2  Difficulty walking R26 2    3  Acute pain of right knee M25 561      Subjective:  Alfredo Talavera states her back is sore today but she is feeling better since she started her rehab  Objective: See treatment log below    Assessment: Tolerated treatment well  Patient exhibited good technique with therapeutic exercises and would benefit from continued PT    Plan: Continue per plan of care  Progress treatment as tolerated  Precautions: Right Knee Patellar Dislocation / Knee Pain      Daily Treatment Log  Manual     MT, ROM 15'  15' 20' 15'   HEP        Exercise Log    Balance Board 30x  30x ea 30x ea 30x   P-Bar-GT-Forward, Backward,Side-Even & Dips        BOSU-Walk 5'   5' 5'   Foam Pad SLR,Hip/KneeFl,Step Ups 30x   30x ea 30x   Foam Beam        Fitter-Slalom        Monster Steps        BAPS- L-2        T/G-Squats PF 30x  30x ea 30x ea 30x   W/P-Hip-Abd,Add,Flex,Ext 10#-30x       WP-Squats        Trimax-TKE        Fesulid-NZ-Pn    2x2' 2x2'   NK Table Exer 5#-30x       NK Table ROM        TM        Stepper        Bike   10' NT 10'   ME, PE 15' 15' 15' 15' 15'           Modalities    MH&ES 20'  NT 20' 20'   US 10'

## 2019-08-05 ENCOUNTER — OFFICE VISIT (OUTPATIENT)
Dept: PHYSICAL THERAPY | Facility: CLINIC | Age: 24
End: 2019-08-05
Payer: COMMERCIAL

## 2019-08-05 DIAGNOSIS — R26.2 DIFFICULTY WALKING: ICD-10-CM

## 2019-08-05 DIAGNOSIS — S83.004A CLOSED PATELLAR DISLOCATION, RIGHT, INITIAL ENCOUNTER: Primary | ICD-10-CM

## 2019-08-05 DIAGNOSIS — M25.561 ACUTE PAIN OF RIGHT KNEE: ICD-10-CM

## 2019-08-05 PROCEDURE — G0283 ELEC STIM OTHER THAN WOUND: HCPCS

## 2019-08-05 PROCEDURE — 97035 APP MDLTY 1+ULTRASOUND EA 15: CPT

## 2019-08-05 PROCEDURE — 97140 MANUAL THERAPY 1/> REGIONS: CPT

## 2019-08-05 PROCEDURE — 97014 ELECTRIC STIMULATION THERAPY: CPT

## 2019-08-05 NOTE — PROGRESS NOTES
Today's date: 2019  Patient name: Tamiko Guerrero  : 1995  MRN: [id number]  Referring provider: Camila Bay MD  Dx:   Encounter Diagnosis     ICD-10-CM    1  Closed patellar dislocation, right, initial encounter S83 004A    2  Difficulty walking R26 2    3  Acute pain of right knee M25 561      Subjective:  "I fell early this morning as I was walking to the bathroom  My R knee dislocated and made me fall  My R knee is so sore today  My orthopaedic dr is going to see me tomorrow "    Objective: See treatment log below    Assessment: Tolerated treatment well  Patient exhibited good technique with therapeutic exercises and would benefit from continued PT to increase R LE ROM/strength and endurance to improve mobility and gait  Plan: Continue per plan of care  Progress treatment as tolerated  Precautions: Right Knee Patellar Dislocation / Knee Pain      Daily Treatment Log  Manual     MT, ROM 15'    15'   HEP        Exercise Log    Balance Board     30x   P-Bar-GT-Forward, Backward,Side-Even & Dips        BOSU-Walk     5'   Foam Pad SLR,Hip/KneeFl,Step Ups     30x   Foam Beam        Fitter-Slalom        Monster Steps        BAPS- L-2        T/G-Squats PF     30x   W/P-Hip-Abd,Add,Flex,Ext        WP-Squats        Trimax-TKE        [id number]     2x2'   NK Table Exer        NK Table ROM        TM        Stepper        Bike     10'   [place], PE 15'    15'           Modalities    MH&ES 20'    20'   US 10'

## 2019-08-07 ENCOUNTER — EVALUATION (OUTPATIENT)
Dept: PHYSICAL THERAPY | Facility: CLINIC | Age: 24
End: 2019-08-07
Payer: COMMERCIAL

## 2019-08-07 ENCOUNTER — OFFICE VISIT (OUTPATIENT)
Dept: OBGYN CLINIC | Facility: CLINIC | Age: 24
End: 2019-08-07
Payer: COMMERCIAL

## 2019-08-07 ENCOUNTER — HOSPITAL ENCOUNTER (OUTPATIENT)
Dept: MRI IMAGING | Facility: HOSPITAL | Age: 24
Discharge: HOME | End: 2019-08-07
Attending: ORTHOPAEDIC SURGERY
Payer: COMMERCIAL

## 2019-08-07 VITALS
DIASTOLIC BLOOD PRESSURE: 80 MMHG | WEIGHT: 192 LBS | BODY MASS INDEX: 32.78 KG/M2 | HEART RATE: 121 BPM | SYSTOLIC BLOOD PRESSURE: 115 MMHG | HEIGHT: 64 IN

## 2019-08-07 DIAGNOSIS — R26.2 DIFFICULTY WALKING: ICD-10-CM

## 2019-08-07 DIAGNOSIS — S83.004D CLOSED DISLOCATION OF RIGHT PATELLA, SUBSEQUENT ENCOUNTER: Primary | ICD-10-CM

## 2019-08-07 DIAGNOSIS — S83.004A CLOSED PATELLAR DISLOCATION, RIGHT, INITIAL ENCOUNTER: Primary | ICD-10-CM

## 2019-08-07 DIAGNOSIS — S83.004D CLOSED DISLOCATION OF RIGHT PATELLA, SUBSEQUENT ENCOUNTER: ICD-10-CM

## 2019-08-07 DIAGNOSIS — M25.561 ACUTE PAIN OF RIGHT KNEE: ICD-10-CM

## 2019-08-07 PROCEDURE — 73721 MRI JNT OF LWR EXTRE W/O DYE: CPT

## 2019-08-07 PROCEDURE — 99213 OFFICE O/P EST LOW 20 MIN: CPT | Performed by: ORTHOPAEDIC SURGERY

## 2019-08-07 NOTE — PROGRESS NOTES
Chief Complaint   right knee pain    History Of Presenting Illness  Sharon Parham 1995 presents with  Right knee pain with sense of instability in the right kneecap  Patient has had multiple subluxation of the kneecap  One further episode of subluxation at home  Patient is slowly improving with physical therapy  Patient is working on  Losing weight  Patient dislocated her patella 2 days ago as well  Patient tells me she has had around 20 dislocations  So far      Current Medications  Current Outpatient Medications   Medication Sig Dispense Refill    cetirizine (ZyrTEC) 10 mg tablet Take 10 mg by mouth daily      citalopram (CeleXA) 40 mg tablet Take 40 mg by mouth daily      fluticasone (FLOVENT HFA) 110 MCG/ACT inhaler Inhale 2 puffs 2 (two) times a day Rinse mouth after use   levothyroxine 100 mcg tablet Take 100 mcg by mouth daily      norgestimate-ethinyl estradiol (ORTHO-CYCLEN) 0 25-35 MG-MCG per tablet Take 1 tablet by mouth daily      ondansetron (ZOFRAN) 4 mg tablet Take 1 tablet (4 mg total) by mouth every 8 (eight) hours as needed for nausea or vomiting 20 tablet 0    QUEtiapine (SEROquel) 100 mg tablet Take 100 mg by mouth daily at bedtime       No current facility-administered medications for this visit  Current Problems    Active Problems: There are no active problems to display for this patient          Review of Systems:    General: negative for - chills, fatigue, fever,  weight gain or weight loss  Psychological: negative for - anxiety, behavioral disorder, concentration difficulties  Ophthalmic: negative for - blurry vision, decreased vision, double vision,      Past Medical History:   Past Medical History:   Diagnosis Date    Asthma     Bipolar 1 disorder ([facility] [address] )     Depression     Disease of thyroid gland     Nerve disorder     Pet allergy     Seasonal allergies        Past Surgical History:   Past Surgical History:   Procedure Laterality Date    WISDOM TOOTH EXTRACTION         Family History:  Family history reviewed and non-contributory  Family History   Problem Relation Age of Onset    Pneumonia Mother     Multiple sclerosis Father        Social History:  Social History     Socioeconomic History    Marital status: Single     Spouse name: None    Number of children: None    Years of education: None    Highest education level: None   Occupational History    None   Social Needs    Financial resource strain: None    Food insecurity:     Worry: None     Inability: None    Transportation needs:     Medical: None     Non-medical: None   Tobacco Use    Smoking status: Current Every Day Smoker    Smokeless tobacco: Never Used   Substance and Sexual Activity    Alcohol use: None    Drug use: None    Sexual activity: None   Lifestyle    Physical activity:     Days per week: None     Minutes per session: None    Stress: None   Relationships    Social connections:     Talks on phone: None     Gets together: None     Attends Anabaptist service: None     Active member of club or organization: None     Attends meetings of clubs or organizations: None     Relationship status: None    Intimate partner violence:     Fear of current or ex partner: None     Emotionally abused: None     Physically abused: None     Forced sexual activity: None   Other Topics Concern    None   Social History Narrative    None       Allergies:   No Known Allergies        Physical ExaminationHt 5' 4" (1 626 m)   Wt 87 1 kg (192 lb)   BMI 32 96 kg/m²   Gen: Alert and oriented to person, place, time  HEENT: EOMI, eyes clear, moist mucus membranes, hearing intact      Orthopedic Exam    Right knee examination   no effusion   apprehension positive   tenderness present in the medial parapatellar region   patella tracks well   no distal neurovascular deficits compartments soft nontender calf soft nontender          Impression   recurrent dislocation right patella          Plan     discussed treatment with the patient   patellar sleeve brace to prevent further dislocations and subluxations   will return with an MRI of the right knee   will hold off on physical therapy till MRIs obtained   follow-up in 3 weeks    Yenni Mcbride MD        Portions of the record may have been created with voice recognition software  Occasional wrong word or "sound a like" substitutions may have occurred due to the inherent limitations of voice recognition software  Read the chart carefully and recognize, using context, where substitutions have occurred

## 2019-08-07 NOTE — PATIENT INSTRUCTIONS
Patellar Dislocation   WHAT YOU NEED TO KNOW:   A patellar dislocation occurs when your patella (kneecap) is forced out of place  It can be caused by a fall or a direct blow to your knee  It can also happen if your knee twists or rotates  It is most likely to happen during physical activity, such as sports, [place] training, or dance  DISCHARGE INSTRUCTIONS:   You may need any of the following:  · NSAIDs , such as ibuprofen, help decrease swelling, pain, and fever  This medicine is available with or without a doctor's order  NSAIDs can cause stomach bleeding or kidney problems in certain people  If you take blood thinner medicine, always ask if NSAIDs are safe for you  Always read the medicine label and follow directions  Do not give these medicines to children under 10months of age without direction from your child's healthcare provider  · Acetaminophen  decreases pain  It is available without a doctor's order  Ask how much to take and how often to take it  Follow directions  Acetaminophen can cause liver damage if not taken correctly  · Prescription pain medicine  may be given to decrease your pain  Do not wait until the pain is severe before you take this medicine  · Take your medicine as directed  Contact your healthcare provider if you think your medicine is not helping or if you have side effects  Tell him of her if you are allergic to any medicine  Keep a list of the medicines, vitamins, and herbs you take  Include the amounts, and when and why you take them  Bring the list or the pill bottles to follow-up visits  Carry your medicine list with you in case of an emergency  Follow up with your healthcare provider or orthopedic surgeon within 2 weeks or as directed:  Write down your questions so you remember to ask them during your visits  Self-care:   · Ice  helps decrease swelling and pain  Ice may also help prevent tissue damage  Use an ice pack, or put crushed ice in a plastic bag  Cover it with a towel and place it on your knee for 15 to 20 minutes every hour or as directed  · Raise your knee  above the level of your heart as often as you can  This will help decrease swelling and pain  Prop your knee on pillows or blankets to keep it elevated comfortably  · Immobilize your knee  for 3 to 6 weeks or as directed  Your healthcare provider may give you a brace, cast, or splint  He may tell you to wrap your knee with athletic tape  This is done to decrease pain and hold your knee joint still to help it heal  This may also help prevent your kneecap from dislocating again  · Use crutches  if your healthcare provider tells you not to put weight on your injured knee  Healthcare providers will show you how to use crutches  You may need them for 4 to 6 weeks  · Physical therapy  teaches you exercises to increase the range of motion in your knee  Exercises make your knee stronger, increase balance, and decrease pain  You may also need to strengthen your stomach, back, hip, and leg muscles  You must continue these exercises after physical therapy ends to help prevent another dislocation  Contact your healthcare provider:   · You have more knee pain  · Your knee feels like it is going to give out  · You have questions or concerns about your condition or care  Return to the emergency department if:   · Your kneecap dislocates again  · You have severe pain and swelling in your knee  © 2017 [employer] Information is for End User's use only and may not be sold, redistributed or otherwise used for commercial purposes  All illustrations and images included in CareNotes® are the copyrighted property of A D [employer] M [employer] Inc  or Jason Delong  The above information is an  only  It is not intended as medical advice for individual conditions or treatments   Talk to your doctor, nurse or pharmacist before following any medical regimen to see if it is safe and effective for you

## 2019-08-13 ENCOUNTER — OFFICE VISIT (OUTPATIENT)
Dept: OBGYN CLINIC | Facility: CLINIC | Age: 24
End: 2019-08-13
Payer: COMMERCIAL

## 2019-08-13 VITALS
HEART RATE: 116 BPM | BODY MASS INDEX: 32.27 KG/M2 | DIASTOLIC BLOOD PRESSURE: 80 MMHG | HEIGHT: 64 IN | WEIGHT: 189 LBS | SYSTOLIC BLOOD PRESSURE: 125 MMHG

## 2019-08-13 DIAGNOSIS — S83.004D CLOSED DISLOCATION OF RIGHT PATELLA, SUBSEQUENT ENCOUNTER: Primary | ICD-10-CM

## 2019-08-13 PROCEDURE — 99212 OFFICE O/P EST SF 10 MIN: CPT | Performed by: PHYSICIAN ASSISTANT

## 2019-08-13 RX ORDER — IBUPROFEN 600 MG/1
600 TABLET ORAL EVERY 6 HOURS PRN
Qty: 30 TABLET | Refills: 0 | Status: SHIPPED | OUTPATIENT
Start: 2019-08-13 | End: 2019-11-13

## 2019-08-13 NOTE — PATIENT INSTRUCTIONS
Continue weight loss  Discussed with the patient that she may be using too much Aleve  For prescription for ibuprofen 600 mg to be taken every 6 hours was sent to her pharmacy instead of using the Aleve  Precautions about GI bleeding were discussed with the patient and should take the medicine with food  We will have the patient restart physical therapy  Continue icing the knee  Recheck in 3 weeks  If she is not so sees improvement she may need surgical intervention as per the discretion of Dr Carlos Sears

## 2019-08-13 NOTE — PROGRESS NOTES
23 y o female presents for MRI follow-up of right knee status post multiple lateral patellar dislocations  Patient also recently had L4-L5 disc surgery  She reports that due to the pain in her knee and her back she has been taking multiple Aleve tablets  She notes she has more pain about the right knee with attempted extension of the knee  Her physical therapy has been on hold the past few days  Review of Systems  Review of systems negative unless otherwise specified in HPI    Past Medical History  Past Medical History:   Diagnosis Date    Asthma     Bipolar 1 disorder ([facility] [address] )     Depression     Disease of thyroid gland     Nerve disorder     Pet allergy     Seasonal allergies      Past Surgical History  Past Surgical History:   Procedure Laterality Date    WISDOM TOOTH EXTRACTION       Current Medications  Current Outpatient Medications on File Prior to Visit   Medication Sig Dispense Refill    cetirizine (ZyrTEC) 10 mg tablet Take 10 mg by mouth daily      citalopram (CeleXA) 40 mg tablet Take 40 mg by mouth daily      fluticasone (FLOVENT HFA) 110 MCG/ACT inhaler Inhale 2 puffs 2 (two) times a day Rinse mouth after use   levothyroxine 100 mcg tablet Take 100 mcg by mouth daily      norgestimate-ethinyl estradiol (ORTHO-CYCLEN) 0 25-35 MG-MCG per tablet Take 1 tablet by mouth daily      ondansetron (ZOFRAN) 4 mg tablet Take 1 tablet (4 mg total) by mouth every 8 (eight) hours as needed for nausea or vomiting 20 tablet 0    QUEtiapine (SEROquel) 100 mg tablet Take 100 mg by mouth daily at bedtime       No current facility-administered medications on file prior to visit        Recent Labs (HCT,HGB,PT,INR,ESR,CRP,GLU,HgA1C)  No results found for: HCT, HGB, WBC, PT, INR, ESR, CRP, GLUCOSE, HGBA1C    Physical exam  · General: Awake, Alert, Oriented  · Eyes: Pupils equal, round and reactive to light  · Heart: regular rate and rhythm  · Lungs: No audible wheezing  · Abdomen: soft  right Knee exam  · Pain over the lateral aspect of the right knee  No significant effusion  Grossly neurovascular intact  She has been wearing a brace  Negative Homans sign  She can get full extension but with difficulty and pain  Flexion 105°  No gross ligamentous laxity  Minimal discomfort over the patellar tendon  No patellar facet tenderness  Positive tenderness over lateral femoral condyle  Procedure  None    Imaging  MRI right knee full-thickness cartilage loss lateral femoral condyle 4 mm in size as well as a shallow trochlea and lateral patellar tilt and mild patella [place]  1  Closed dislocation of right patella, subsequent encounter      Assessment:  right knee recurrent patellar dislocation with full-thickness cartilage loss lateral femoral condyle and shallow trochlea and lateral patella tilt  Plan:  Continue weight loss  Discussed with the patient that she may be using too much Aleve  For prescription for ibuprofen 600 mg to be taken every 6 hours was sent to her pharmacy instead of using the Aleve  Precautions about GI bleeding were discussed with the patient and should take the medicine with food  We will have the patient restart physical therapy  Continue icing the knee  Recheck in 3 weeks  If she is not so sees improvement she may need surgical intervention as per the discretion of Dr Christophe Giles

## 2019-08-19 ENCOUNTER — OFFICE VISIT (OUTPATIENT)
Dept: PHYSICAL THERAPY | Facility: CLINIC | Age: 24
End: 2019-08-19
Payer: COMMERCIAL

## 2019-08-19 DIAGNOSIS — M25.561 ACUTE PAIN OF RIGHT KNEE: ICD-10-CM

## 2019-08-19 DIAGNOSIS — S83.004A CLOSED PATELLAR DISLOCATION, RIGHT, INITIAL ENCOUNTER: Primary | ICD-10-CM

## 2019-08-19 DIAGNOSIS — R26.2 DIFFICULTY WALKING: ICD-10-CM

## 2019-08-19 PROCEDURE — 97110 THERAPEUTIC EXERCISES: CPT | Performed by: PHYSICAL THERAPIST

## 2019-08-19 PROCEDURE — 97140 MANUAL THERAPY 1/> REGIONS: CPT | Performed by: PHYSICAL THERAPIST

## 2019-08-19 PROCEDURE — 97116 GAIT TRAINING THERAPY: CPT | Performed by: PHYSICAL THERAPIST

## 2019-08-19 PROCEDURE — 97164 PT RE-EVAL EST PLAN CARE: CPT | Performed by: PHYSICAL THERAPIST

## 2019-08-19 PROCEDURE — G0283 ELEC STIM OTHER THAN WOUND: HCPCS | Performed by: PHYSICAL THERAPIST

## 2019-08-19 PROCEDURE — 97014 ELECTRIC STIMULATION THERAPY: CPT | Performed by: PHYSICAL THERAPIST

## 2019-08-19 PROCEDURE — 97112 NEUROMUSCULAR REEDUCATION: CPT | Performed by: PHYSICAL THERAPIST

## 2019-08-19 NOTE — LETTER
2019  [address] Unit / PT Reevaluation  Rosmery Duenas MD  [address], [address]    Patient: Kirill Koenig   YOB: 1995   Date of Visit: 2019     Encounter Diagnosis     ICD-10-CM    1  Closed patellar dislocation, right, initial encounter S83 004A    2  Difficulty walking R26 2    3  Acute pain of right knee M25 561      Dear Dr Taylor Bang:    Thank you for your recent referral of Kirill Koenig  Please review the attached evaluation summary from [place] [id number] recent visit  Please verify that you agree with the plan of care by signing the attached order  If you have any questions or concerns, please do not hesitate to call  I sincerely appreciate the opportunity to share in the care of one of your patients and hope to have another opportunity to work with you in the near future  Sincerely,    Keren Yang, PT    Referring Provider:      I certify that I have read the below Plan of Care and certify the need for these services furnished under this plan of treatment while under my care  Rosmery Duenas MD  [address] [place] [address] [place]          Today's date: 2019  Patient name: Kirill Koenig  : 1995  MRN: [id number]  Referring provider: Julian Centeno MD  Dx:   Encounter Diagnosis     ICD-10-CM    1  Closed patellar dislocation, right, initial encounter S83 004A    2  Difficulty walking R26 2    3  Acute pain of right knee M25 561      Subjective:  Paula Gauthier states her right knee is sore  She states she seems to have major meniscal issues per her doctor  Objective: See treatment log below    Assessment: Tolerated treatment well  Patient exhibited good technique with therapeutic exercises and would benefit from continued PT    Plan: Continue per plan of care  Progress treatment as tolerated  Precautions: Right Knee Patellar Dislocation / Knee Pain      Daily Treatment Log  Manual     MT, ROM 15' 15' 15'   HEP        Exercise Log    Balance Board  30x   30x   P-Bar-GT-Forward, Backward,Side-Even & Dips        BOSU-Walk  5'   5'   Foam Pad SLR,Hip/KneeFl,Step Ups  30x   30x   Foam Beam        Fitter-Slalom        Monster Steps        BAPS- L-2        T/G-Squats PF  30x   30x   W/P-Hip-Abd,Add,Flex,Ext  5#-30x      WP-Squats        Trimax-TKE        [id number]-TK-Rk  2x2'   2x2'   NK Table Exer        NK Table ROM        TM        Stepper        Bike     10'   [place], PE 13' 15'   15'           Modalities    MH&ES 20' 20'   20'   US 10'           PT Re-Evaluation   Today's date: 2019    Patient name: Valencia Centeno  : 1995  MRN: [id number]  Referring provider: Roberto Carranza MD  Dx:   Encounter Diagnosis     ICD-10-CM    1  Closed patellar dislocation, right, initial encounter S83 004A    2  Difficulty walking R26 2    3  Acute pain of right knee M25 561      Assessment  Assessment details: Patient reports more power and strength  Patient reports a little less pain but she still has a lot of pain  The longer she walks or the longer she stands th more pain she develops  Impairments: abnormal gait, abnormal or restricted ROM, abnormal movement, activity intolerance, impaired balance, impaired physical strength, pain with function and safety issue  Understanding of Dx/Px/POC: excellent   Prognosis: good    Goals  STG 2-4 weeks:    Increase B LE strength 2-5 lbs  Decrease pain by 1-2 levels on 1-10 pain scale  Increase standing/walking tolerance to >30 minutes  Patient independent with HEP  LTG 6-8 weeks:   Increase B LE strength 10-20 lbs  Decrease pain to 0-2/10 with activity  Increase single leg stance >30 seconds  Increase standing/walking tolerance to >90 minutes  Increase range of motion to normal   Improve gait pattern, coordination and balance to normal   D/C with HEP        Plan  Patient would benefit from: PT eval and skilled physical therapy  Planned modality interventions: TENS, ultrasound and unattended electrical stimulation  Planned therapy interventions: joint mobilization, manual therapy, neuromuscular re-education, patient education, postural training, self care, strengthening, stretching, therapeutic activities, therapeutic exercise, therapeutic training, transfer training, home exercise program, graded exercise, gait training, flexibility, coordination, balance and balance/weight bearing training  Frequency: 3x week  Duration in weeks: 6  Treatment plan discussed with: patient      Subjective Evaluation    Pain  Quality: discomfort, knife-like, pulling, squeezing, sharp, radiating, throbbing and tight  Relieving factors: relaxation, rest, support and change in position  Aggravating factors: running, lifting, stair climbing, walking and standing  Progression: improved    Treatments  Previous treatment: physical therapy  Current treatment: physical therapy  Patient Goals  Patient goals for therapy: decreased pain, improved balance, increased motion, return to work, return to [employer] activities, independence with ADLs/IADLs and increased strength        Objective     Date of onset:  07/04/2019    Date of Surgery:  None    History of Present Episode: 7/8/2019  Ra Valdez states that at age 15 she apparently dislocated her right knee patella  She has had issues ever since this incident  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  Past Medical History:    7/8/2019  Ra Valdez reports chronic right knee / patellar issues  Asthma, Hypothyroid  Back Sx Lumbar L4-5  Previous Level of Functional Ability:  7/8/2019  Ra Valdez states her right knee was fine until it dislocated  Inspection / Palpation:  Knee:  7/8/2019  Mesomorphic / Endomorphic body type  No signs of infection  No signs of wounds  No signs of drainage  No signs of ecchymotic regions  No signs of erythremic regions    Moderate signs of muscle spasm  Moderate signs of muscle guarding  Moderate signs of tenderness reported to palpation  No signs of swelling  Positive signs of a surgery site with her lumbar region  Current conditions appears consistent with recent acute episode  Chief Complaints:  7/8/2019  Krystal Srinivasan reports moderate difficulty with standing  Krystal Srinivasan reports moderate difficulty with walking  Krystal Srinivasan reports moderate difficulty with movement / use of her right knee  Krystal Srinivasan reports moderate difficulty with use of stairs  Krystal Srinivasan reports severe difficulty with running  Krystal Srinivasan reports severe difficulty with jumping  Krystal Srinivasan reports moderate difficulty with use of inclines  Krystal Srinivasan reports mild difficulty with sleeping  Krystal Srinivasan reports moderate difficulty with her strength and endurance  Krystal Srinivasan reports mild to moderate limitations with her range of motion  Krystal Srinivasan reports mild difficulty lying on her right knee region      KNEE PAIN Resting Moving Palpation Standing Walking   7/8/2019 Lt 0 0 0 0 0   7/8/2019 Rt 3-4 3-5 5-8 3-4 6-8   8/19/2019 Rt 2-4 2-4 3-6 2-4 4-7     KNEE PAIN Running Stairs Sleeping Twisting Jumping   7/8/2019 Lt 0 0 0 0 0   7/8/2019 Rt 8-10 5-8 1-3 9-10 9-10   8/19/2019 Rt 6-8 4-7 1-3 6-8 6-8     KNEE AROM Flexion Extension SLR   7/8/2019 Lt 140° 0° 90°   7/8/2019 Rt 140° 0° 90°   8/19/2019 Rt 142° 0° 92°     KNEE MMT Flexion Extension Varus Stress Valgus Stress   7/8/2019 Lt 0/10  29 lbs 0/10  31 lbs 0/10  28 lbs 0/10  29 lbs   7/8/2019 Rt 6/10  12 lbs 7/10  10 lbs 5/10  9 lbs 5/10  9 lbs   8/19/2019 Rt 5/10  15 lbs 6/10  12 lbs 4/10  12 lbs 4/10  12 lbs     Knee Screen MCL LCL ACL PCL   7/8/2019 Rt Negative Negative Negative Negative   7/8/2019 Lt Negative Negative Negative Negative     Knee Screen Patellar Quad Stress Meniscal Medial Meniscal Lateral   7/8/2019 Rt POSITIVE POSITIVE Negative Negative   7/8/2019 Lt Negative Negative Negative Negative     Precautions: Right Knee Patellar Dislocation / Knee Pain

## 2019-08-19 NOTE — PROGRESS NOTES
Today's date: 2019  Patient name: Dean Mcgarry  : 1995  MRN: [id number]  Referring provider: Bo Ritchie MD  Dx:   Encounter Diagnosis     ICD-10-CM    1  Closed patellar dislocation, right, initial encounter S83 004A    2  Difficulty walking R26 2    3  Acute pain of right knee M25 561      Subjective:  Johnny Johansen states her right knee is sore  She states she seems to have major meniscal issues per her doctor  Objective: See treatment log below    Assessment: Tolerated treatment well  Patient exhibited good technique with therapeutic exercises and would benefit from continued PT    Plan: Continue per plan of care  Progress treatment as tolerated  Precautions: Right Knee Patellar Dislocation / Knee Pain      Daily Treatment Log  Manual     MT, ROM 15' 15'   15'   HEP        Exercise Log    Balance Board  30x   30x   P-Bar-GT-Forward, Backward,Side-Even & Dips        BOSU-Walk  5'   5'   Foam Pad SLR,Hip/KneeFl,Step Ups  30x   30x   Foam Beam        Fitter-Jackie Samuelster Steps        BAPS- L-2        T/G-Squats PF  30x   30x   W/P-Hip-Abd,Add,Flex,Ext  5#-30x      WP-Squats        Trimax-TKE        [id number]-TU-[id number]  2x2'   2x2'   NK Table Exer        NK Table ROM        TM        Stepper        Bike     10'   [place], PE 13' 15'   15'           Modalities    MH&ES 20' 20'   20'    10'

## 2019-08-20 NOTE — PROGRESS NOTES
PT Re-Evaluation   Today's date: 2019    Patient name: Daxa Ontiveros  : 1995  MRN: [id number]  Referring provider: Emma Mi MD  Dx:   Encounter Diagnosis     ICD-10-CM    1  Closed patellar dislocation, right, initial encounter O48 586R    2  Difficulty walking R26 2    3  Acute pain of right knee M25 561      Assessment  Assessment details: Patient reports more power and strength  Patient reports a little less pain but she still has a lot of pain  The longer she walks or the longer she stands th more pain she develops  Impairments: abnormal gait, abnormal or restricted ROM, abnormal movement, activity intolerance, impaired balance, impaired physical strength, pain with function and safety issue  Understanding of Dx/Px/POC: excellent   Prognosis: good    Goals  STG 2-4 weeks:    Increase B LE strength 2-5 lbs  Decrease pain by 1-2 levels on 1-10 pain scale  Increase standing/walking tolerance to >30 minutes  Patient independent with HEP  LTG 6-8 weeks:   Increase B LE strength 10-20 lbs  Decrease pain to 0-2/10 with activity  Increase single leg stance >30 seconds  Increase standing/walking tolerance to >90 minutes  Increase range of motion to normal   Improve gait pattern, coordination and balance to normal   D/C with HEP        Plan  Patient would benefit from: PT eval and skilled physical therapy  Planned modality interventions: TENS, ultrasound and unattended electrical stimulation  Planned therapy interventions: joint mobilization, manual therapy, neuromuscular re-education, patient education, postural training, self care, strengthening, stretching, therapeutic activities, therapeutic exercise, therapeutic training, transfer training, home exercise program, graded exercise, gait training, flexibility, coordination, balance and balance/weight bearing training  Frequency: 3x week  Duration in weeks: 6  Treatment plan discussed with: patient      Subjective Evaluation    Pain  Quality: discomfort, knife-like, pulling, squeezing, sharp, radiating, throbbing and tight  Relieving factors: relaxation, rest, support and change in position  Aggravating factors: running, lifting, stair climbing, walking and standing  Progression: improved    Treatments  Previous treatment: physical therapy  Current treatment: physical therapy  Patient Goals  Patient goals for therapy: decreased pain, improved balance, increased motion, return to work, return to [employer] activities, independence with ADLs/IADLs and increased strength        Objective     Date of onset:  07/04/2019    Date of Surgery:  None    History of Present Episode: 7/8/2019  [facility] states that at age 15 she apparently dislocated her right knee patella  She has had issues ever since this incident  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  Past Medical History:    7/8/2019  [facility] reports chronic right knee / patellar issues  Asthma, Hypothyroid  Back Sx Lumbar L4-5  Previous Level of Functional Ability:  7/8/2019  [facility] her right knee was fine until it dislocated  Inspection / Palpation:  Knee:  7/8/2019  Mesomorphic / Endomorphic body type  No signs of infection  No signs of wounds  No signs of drainage  No signs of ecchymotic regions  No signs of erythremic regions  Moderate signs of muscle spasm  Moderate signs of muscle guarding  Moderate signs of tenderness reported to palpation  No signs of swelling  Positive signs of a surgery site with her lumbar region  Current conditions appears consistent with recent acute episode  Chief Complaints:  7/8/2019  [facility] reports moderate difficulty with standing  [facility] reports moderate difficulty with walking  [facility] reports moderate difficulty with movement / use of her right knee  [facility] reports moderate difficulty with use of stairs  [facility] reports severe difficulty with running  Joana Aguilar reports severe difficulty with jumping  Joana Aguilar reports moderate difficulty with use of inclines  Joana Aguilar reports mild difficulty with sleeping  Joana Aguilar reports moderate difficulty with her strength and endurance  Joana Aguilar reports mild to moderate limitations with her range of motion  Joana Aguilar reports mild difficulty lying on her right knee region  KNEE PAIN Resting Moving Palpation Standing Walking   7/8/2019 Lt 0 0 0 0 0   7/8/2019 Rt 3-4 3-5 5-8 3-4 6-8   8/19/2019 Rt 2-4 2-4 3-6 2-4 4-7     KNEE PAIN Running Stairs Sleeping Twisting Jumping   7/8/2019 Lt 0 0 0 0 0   7/8/2019 Rt 8-10 5-8 1-3 9-10 9-10   8/19/2019 Rt 6-8 4-7 1-3 6-8 6-8     KNEE AROM Flexion Extension SLR   7/8/2019 Lt 140° 0° 90°   7/8/2019 Rt 140° 0° 90°   8/19/2019 Rt 142° 0° 92°     KNEE MMT Flexion Extension Varus Stress Valgus Stress   7/8/2019 Lt 0/10  29 lbs 0/10  31 lbs 0/10  28 lbs 0/10  29 lbs   7/8/2019 Rt 6/10  12 lbs 7/10  10 lbs 5/10  9 lbs 5/10  9 lbs   8/19/2019 Rt 5/10  15 lbs 6/10  12 lbs 4/10  12 lbs 4/10  12 lbs     Knee Screen MCL LCL ACL PCL   7/8/2019 Rt Negative Negative Negative Negative   7/8/2019 Lt Negative Negative Negative Negative     Knee Screen Patellar Quad Stress Meniscal Medial Meniscal Lateral   7/8/2019 Rt POSITIVE POSITIVE Negative Negative   7/8/2019 Lt Negative Negative Negative Negative     Precautions: Right Knee Patellar Dislocation / Knee Pain

## 2019-08-21 ENCOUNTER — OFFICE VISIT (OUTPATIENT)
Dept: PHYSICAL THERAPY | Facility: CLINIC | Age: 24
End: 2019-08-21
Payer: COMMERCIAL

## 2019-08-21 NOTE — PROGRESS NOTES
Today's date: 2019  Patient name: Daxa Ontiveros  : 1995  MRN: [id number]  Referring provider: Emma Mi MD  Dx:   Encounter Diagnosis     ICD-10-CM    1  Closed patellar dislocation, right, initial encounter S83 004A    2  Difficulty walking R26 2    3  Acute pain of right knee M25 561      Subjective:  ***    Objective: See treatment log below    Assessment: Tolerated treatment well  Patient exhibited good technique with therapeutic exercises and would benefit from continued PT to increase R LE ROM/strength and endurance to improve mobility and gait  Plan: Continue per plan of care  Progress treatment as tolerated  Precautions: Right Knee Patellar Dislocation / Knee Pain      Daily Treatment Log  Manual       MT, ROM 15' 15'      HEP        Exercise Log      Balance Board  30x      P-Bar-GT-Forward, Backward,Side-Even & Dips        BOSU-Walk  5'      Foam Pad SLR,Hip/KneeFl,Step Ups  30x      Foam Beam        Fitter-Slalom        Monster Steps        BAPS- L-2        T/G-Squats PF  30x      W/P-Hip-Abd,Add,Flex,Ext  5#-30x      WP-Squats        Trimax-TKE        Rktgdae-GO-Yd  2x2'      NK Table Exer        NK Table ROM        TM        Keyur PHAM, PE 15' 15' 15'             Modalities      MH&ES 20' 20'      US 10'

## 2019-08-22 ENCOUNTER — OFFICE VISIT (OUTPATIENT)
Dept: PHYSICAL THERAPY | Facility: CLINIC | Age: 24
End: 2019-08-22
Payer: COMMERCIAL

## 2019-08-22 DIAGNOSIS — M25.561 ACUTE PAIN OF RIGHT KNEE: ICD-10-CM

## 2019-08-22 DIAGNOSIS — S83.004A CLOSED PATELLAR DISLOCATION, RIGHT, INITIAL ENCOUNTER: Primary | ICD-10-CM

## 2019-08-22 DIAGNOSIS — R26.2 DIFFICULTY WALKING: ICD-10-CM

## 2019-08-22 PROCEDURE — 97140 MANUAL THERAPY 1/> REGIONS: CPT | Performed by: PHYSICAL THERAPIST

## 2019-08-22 PROCEDURE — 97014 ELECTRIC STIMULATION THERAPY: CPT | Performed by: PHYSICAL THERAPIST

## 2019-08-22 PROCEDURE — 97112 NEUROMUSCULAR REEDUCATION: CPT | Performed by: PHYSICAL THERAPIST

## 2019-08-22 PROCEDURE — 97035 APP MDLTY 1+ULTRASOUND EA 15: CPT | Performed by: PHYSICAL THERAPIST

## 2019-08-22 PROCEDURE — G0283 ELEC STIM OTHER THAN WOUND: HCPCS | Performed by: PHYSICAL THERAPIST

## 2019-08-22 PROCEDURE — 97110 THERAPEUTIC EXERCISES: CPT | Performed by: PHYSICAL THERAPIST

## 2019-08-22 NOTE — PROGRESS NOTES
Today's date: 2019  Patient name: Renea Florentino  : 1995  MRN: [id number]  Referring provider: Brian Conner MD  Dx:   Encounter Diagnosis     ICD-10-CM    1  Closed patellar dislocation, right, initial encounter S83 004A    2  Difficulty walking R26 2    3  Acute pain of right knee M25 561      Subjective:  Sandeep Kate states her knee is really sore today  It hurts mostly on the lateral aspect of her right knee  Objective: See treatment log below    Assessment: Tolerated treatment well  Patient exhibited good technique with therapeutic exercises and would benefit from continued PT    Plan: Continue per plan of care  Progress treatment as tolerated  Precautions: Right Knee Patellar Dislocation / Knee Pain      Daily Treatment Log  Manual     MT, ROM 15' 15' 15'  15'   HEP        Exercise Log    Balance Board  30x 30x  30x   P-Bar-GT-Forward, Backward,Side-Even & Dips        BOSU-Walk  5' 5'  5'   Foam Pad SLR,Hip/KneeFl,Step Ups  30x 30x  30x   Foam Beam        Fitter-Slalom        Monster Steps        BAPS- L-2        T/G-Squats PF  30x   30x   W/P-Hip-Abd,Add,Flex,Ext  5#-30x      WP-Squats        Trimax-TKE        Rlhvtkt-UP-Ha  2x2'   2x2'   NK Table Exer        NK Table ROM        TM        Stepper        Bike     10'   [place], PE 15' 15' 15'  15'           Modalities    MH&ES 20' 20' 20'  20'    10'  10'

## 2019-08-26 ENCOUNTER — OFFICE VISIT (OUTPATIENT)
Dept: PHYSICAL THERAPY | Facility: CLINIC | Age: 24
End: 2019-08-26
Payer: COMMERCIAL

## 2019-08-26 DIAGNOSIS — R26.2 DIFFICULTY WALKING: ICD-10-CM

## 2019-08-26 DIAGNOSIS — M25.561 ACUTE PAIN OF RIGHT KNEE: ICD-10-CM

## 2019-08-26 DIAGNOSIS — S83.004A CLOSED PATELLAR DISLOCATION, RIGHT, INITIAL ENCOUNTER: Primary | ICD-10-CM

## 2019-08-26 PROCEDURE — 97014 ELECTRIC STIMULATION THERAPY: CPT

## 2019-08-26 PROCEDURE — 97110 THERAPEUTIC EXERCISES: CPT

## 2019-08-26 PROCEDURE — 97140 MANUAL THERAPY 1/> REGIONS: CPT

## 2019-08-26 PROCEDURE — G0283 ELEC STIM OTHER THAN WOUND: HCPCS

## 2019-08-26 PROCEDURE — 97035 APP MDLTY 1+ULTRASOUND EA 15: CPT

## 2019-08-26 NOTE — PROGRESS NOTES
Today's date: 2019  Patient name: Kina Anderson  : 1995  MRN: [id number]  Referring provider: Ben Chavez MD  Dx:   Encounter Diagnosis     ICD-10-CM    1  Closed patellar dislocation, right, initial encounter S83 004A    2  Difficulty walking R26 2    3  Acute pain of right knee M25 561      Subjective:  No new c/o pain today  Objective: See treatment log below    Assessment: Tolerated treatment well  Patient exhibited good technique with therapeutic exercises and would benefit from continued PT to increase R LE ROM/strength and endurance to improve mobility and gait  Plan: Continue per plan of care  Progress treatment as tolerated  Precautions: Right Knee Patellar Dislocation / Knee Pain      Daily Treatment Log  Manual      MT, ROM 15' 15' 15' 20'    HEP        Exercise Log     Balance Board  30x 30x 30x ea    P-Bar-GT-Forward, Backward,Side-Even & Dips        BOSU-Walk  5' 5' 5'    Foam Pad SLR,Hip/KneeFl,Step Ups  30x 30x 30x ea    Foam Beam        Fitter-Slalom        Monster Steps        BAPS- L-2        T/G-Squats PF  30x  30x    W/P-Hip-Abd,Add,Flex,Ext  5#-30x  10#-30x    WP-Squats        Trimax-TKE        Gjrdwsx-IU-Il  2x2'  2x2'    NK Table Exer        NK Table ROM        TM        Stepper        Bike        ME, PE 15' 15' 15' 15'            Modalities     MH&ES 20' 20' 20' 20'    US 10'  10' 10'

## 2019-08-28 ENCOUNTER — OFFICE VISIT (OUTPATIENT)
Dept: PHYSICAL THERAPY | Facility: CLINIC | Age: 24
End: 2019-08-28
Payer: COMMERCIAL

## 2019-08-28 DIAGNOSIS — M25.561 ACUTE PAIN OF RIGHT KNEE: ICD-10-CM

## 2019-08-28 DIAGNOSIS — S83.004A CLOSED PATELLAR DISLOCATION, RIGHT, INITIAL ENCOUNTER: Primary | ICD-10-CM

## 2019-08-28 DIAGNOSIS — R26.2 DIFFICULTY WALKING: ICD-10-CM

## 2019-08-28 PROCEDURE — 97140 MANUAL THERAPY 1/> REGIONS: CPT | Performed by: PHYSICAL THERAPIST

## 2019-08-28 PROCEDURE — 97014 ELECTRIC STIMULATION THERAPY: CPT | Performed by: PHYSICAL THERAPIST

## 2019-08-28 PROCEDURE — G0283 ELEC STIM OTHER THAN WOUND: HCPCS | Performed by: PHYSICAL THERAPIST

## 2019-08-28 PROCEDURE — 97112 NEUROMUSCULAR REEDUCATION: CPT | Performed by: PHYSICAL THERAPIST

## 2019-08-28 PROCEDURE — 97110 THERAPEUTIC EXERCISES: CPT | Performed by: PHYSICAL THERAPIST

## 2019-08-28 NOTE — PROGRESS NOTES
Today's date: 2019  Patient name: La Person  : 1995  MRN: [id number]  Referring provider: Silvia Foster MD  Dx:   Encounter Diagnosis     ICD-10-CM    1  Closed patellar dislocation, right, initial encounter S83 004A    2  Difficulty walking R26 2    3  Acute pain of right knee M25 561      Subjective:  Lexi Corcoran states her right knee is feeling better  Objective: See treatment log below    Assessment: Tolerated treatment well  Patient exhibited good technique with therapeutic exercises and would benefit from continued PT    Plan: Continue per plan of care  Progress treatment as tolerated  Precautions: Right Knee Patellar Dislocation / Knee Pain      Daily Treatment Log  Manual     MT, ROM 15' 15' 15' 20' 15'   HEP        Exercise Log    Balance Board  30x 30x 30x ea 30x   P-Bar-GT-Forward, Backward,Side-Even & Dips        BOSU-Walk  5' 5' 5' 5'   Foam Pad SLR,Hip/KneeFl,Step Ups  30x 30x 30x ea 30x   Foam Beam        Fitter-Slalom        Monster Steps        BAPS- L-2        T/G-Squats PF  30x  30x 30x   W/P-Hip-Abd,Add,Flex,Ext  5#-30x  10#-30x 10#-30x   WP-Squats        Trimax-TKE        Lrqqbqj-WJ-Xs  2x2'  2x2' 2x2'   NK Table Exer        NK Table ROM        TM        Stepper        Bike        ME, PE 15' 15' 15' 15' 15'           Modalities    MH&ES 20' 20' 20' 20' 20'    10'  10' 10'

## 2019-08-28 NOTE — LETTER
2019  Signature Required / [address] / PT Discharge  Melissa Navarro MD  [address] [facility] [address]    Patient: Valencia Centeno   YOB: 1995   Date of Visit: 2019     Encounter Diagnosis     ICD-10-CM    1  Closed patellar dislocation, right, initial encounter S83 004A    2  Difficulty walking R26 2    3  Acute pain of right knee M25 561      Dear Dr Theadore Kocher:    Thank you for your recent referral of Valencia Centeno  Please review the attached evaluation summary from [facility] recent visit  Please verify that you agree with the plan of care by signing the attached order  If you have any questions or concerns, please do not hesitate to call  I sincerely appreciate the opportunity to share in the care of one of your patients and hope to have another opportunity to work with you in the near future  Sincerely,    Hill Barajas, PT    Referring Provider:    I certify that I have read the below Plan of Care and certify the need for these services furnished under this plan of treatment while under my care  Melissa Navraro MD  [address] [facility] [address]  [place] [place]          Today's date: 2019  Patient name: Valencia Centeno  : 1995  MRN: [id number]  Referring provider: Roberto Carranza MD  Dx:   Encounter Diagnosis     ICD-10-CM    1  Closed patellar dislocation, right, initial encounter X72 079T    2  Difficulty walking R26 2    3  Acute pain of right knee M25 561      Subjective:  Bria Edmond states her right knee is feeling better  Objective: See treatment log below    Assessment: Tolerated treatment well  Patient exhibited good technique with therapeutic exercises and would benefit from continued PT    Plan: Continue per plan of care  Progress treatment as tolerated  Precautions: Right Knee Patellar Dislocation / Knee Pain      Daily Treatment Log  Manual     MT, ROM 15' 15' 15' 20' 15'   HEP Exercise Log    Balance Board  30x 30x 30x ea 30x   P-Bar-GT-Forward, Backward,Side-Even & Dips        BOSU-Walk  5' 5' 5' 5'   Foam Pad SLR,Hip/KneeFl,Step Ups  30x 30x 30x ea 30x   Foam Beam        Fitter-Slalom        Monster Steps        BAPS- L-2        T/G-Squats PF  30x  30x 30x   W/P-Hip-Abd,Add,Flex,Ext  5#-30x  10#-30x 10#-30x   WP-Squats        Trimax-TKE        Dtchkco-GF-Gc  2x2'  2x2' 2x2'   NK Table Exer        NK Table ROM        TM        Stepper        Bike        ME, PE 15' 15' 15' 15' 15'           Modalities    MH&ES 20' 20' 20' 20' 20'   US 10'  10' 10'        PT Discharge  Today's date: 2019  Patient name: Christina Devon  : 1995  MRN: [id number]  Referring provider: Jackie Pritchett MD  Dx:   Encounter Diagnosis     ICD-10-CM    1  Closed patellar dislocation, right, initial encounter S83 004A    2  Difficulty walking R26 2    3  Acute pain of right knee M25 561      Assessment/Plan    Subjective    Objective     2019  Christina Osorio has not returned for more treatment  Christina Osorio did not attend today's appointment  I cannot provide you with a current progress report but I can provide you with information based on previous performance  Christina Osorio is discharged at this time

## 2019-09-04 ENCOUNTER — OFFICE VISIT (OUTPATIENT)
Dept: OBGYN CLINIC | Facility: CLINIC | Age: 24
End: 2019-09-04
Payer: COMMERCIAL

## 2019-09-04 VITALS
HEART RATE: 98 BPM | DIASTOLIC BLOOD PRESSURE: 74 MMHG | SYSTOLIC BLOOD PRESSURE: 111 MMHG | HEIGHT: 64 IN | BODY MASS INDEX: 32.44 KG/M2 | WEIGHT: 190 LBS

## 2019-09-04 DIAGNOSIS — S83.004D CLOSED DISLOCATION OF RIGHT PATELLA, SUBSEQUENT ENCOUNTER: Primary | ICD-10-CM

## 2019-09-04 PROBLEM — S83.004A CLOSED DISLOCATION OF RIGHT PATELLA: Status: ACTIVE | Noted: 2019-09-04

## 2019-09-04 PROCEDURE — 99213 OFFICE O/P EST LOW 20 MIN: CPT | Performed by: ORTHOPAEDIC SURGERY

## 2019-09-04 RX ORDER — CEFAZOLIN SODIUM 2 G/50ML
2000 SOLUTION INTRAVENOUS ONCE
Status: CANCELLED | OUTPATIENT
Start: 2019-09-16 | End: 2019-09-04

## 2019-09-04 RX ORDER — CHLORHEXIDINE GLUCONATE 4 G/100ML
SOLUTION TOPICAL DAILY PRN
Status: CANCELLED | OUTPATIENT
Start: 2019-09-04

## 2019-09-04 RX ORDER — PREDNISONE 1 MG/1
1 TABLET ORAL DAILY
COMMUNITY
End: 2019-11-13

## 2019-09-04 NOTE — PATIENT INSTRUCTIONS
Pre-Surgery Instructions:   Medication Instructions    cetirizine (ZyrTEC) 10 mg tablet per anesthesia guidelines     citalopram (CeleXA) 40 mg tablet per anesthesia guidelines     fluticasone (FLOVENT HFA) 110 MCG/ACT inhaler per anesthesia guidelines     ibuprofen (MOTRIN) 600 mg tablet Stop taking 3 days prior to surgery    levothyroxine 100 mcg tablet per anesthesia guidelines     norgestimate-ethinyl estradiol (ORTHO-CYCLEN) 0 25-35 MG-MCG per tablet per anesthesia guidelines     ondansetron (ZOFRAN) 4 mg tablet per anesthesia guidelines     predniSONE 1 mg tablet per anesthesia guidelines     QUEtiapine (SEROquel) 100 mg tablet per anesthesia guidelines    Diagnostic Knee Arthroscopy   WHAT YOU NEED TO KNOW:   Diagnostic knee arthroscopy is a procedure to look inside your knee joint  An arthroscope is a flexible tube with a light and camera on the end  Diagnostic arthroscopy is usually done to check for disease or damage inside your knee  DISCHARGE INSTRUCTIONS:   Medicines:   · Pain medicine: You may be given medicine to take away or decrease pain  Do not wait until the pain is severe before you take your medicine  · Take your medicine as directed  Contact your healthcare provider if you think your medicine is not helping or if you have side effects  Tell him or her if you are allergic to any medicine  Keep a list of the medicines, vitamins, and herbs you take  Include the amounts, and when and why you take them  Bring the list or the pill bottles to follow-up visits  Carry your medicine list with you in case of an emergency  Follow up with your healthcare provider or orthopedist as directed: You will need to return to have your wound checked and stitches removed  Write down your questions so you remember to ask them during your visits  Wound care:  Keep your bandage clean and dry  When you are allowed to bathe, carefully wash the wound with soap and water   Dry the area and put on new, clean bandages as directed  Change your bandages when they get wet or dirty  Self-care:   · Elevate:  Raise your knee above the level of your heart as often as you can  This will help decrease swelling and pain  Prop your knee on pillows or blankets to keep it elevated comfortably  · Ice:  Ice helps decrease swelling and pain  Ice may also help prevent tissue damage  Use an ice pack or put crushed ice in a plastic bag  Cover it with a towel, and place it on your knee for 15 to 20 minutes every hour as directed  · Wear pressure stockings: These are long, tight stockings that put pressure on your legs to promote blood flow and prevent clots  · Wear your brace: You may need to wear a brace on your knee  This will help prevent movement so your knee can heal  You may need to use crutches to help you move around  · Exercise:  Ask your healthcare provider about the best exercise plan for you  Start slowly and return to your usual activities as directed  Physical therapy:  A physical therapist teaches you exercises to help improve movement and strength, and to decrease pain  Contact your healthcare provider or orthopedist if:   · You have a fever  · You have more pain in your knee, even after you take pain medicines  · Your wound is red, swollen, or draining pus  · You have questions or concerns about your condition or care  Seek care immediately or call 911 if:   · Blood soaks through your bandage  · Your stitches come apart  · You fall or injure your knee  · Your toes are numb, tingly, cool, or blue  · Your arm or leg feels warm, tender, and painful  It may look swollen and red  · You feel lightheaded and short of breath  · You have chest pain when you take a deep breath or cough  · You cough up blood  © 2017 [address] [employer] Information is for End User's use only and may not be sold, redistributed or otherwise used for commercial purposes   All illustrations and images included in CareNotes® are the copyrighted property of A D [employer] M [employer] Inc  or Jason Delong  The above information is an  only  It is not intended as medical advice for individual conditions or treatments  Talk to your doctor, nurse or pharmacist before following any medical regimen to see if it is safe and effective for you

## 2019-09-04 NOTE — PROGRESS NOTES
Chief Complaint   right knee pain    History Of Presenting Illness  Daxa Ontiveros 1995 presents with  Right knee pain due to recurrent patellar dislocation  Patient initially dislocated right patella 10 years ago  Since then patient has had over 20-30 subluxation episodes  Patient has sense of instability in the right knee  Symptoms not improved with bracing and physical therapy  Patient had recent spine surgery and has a been under the care of Dr Patrick Inman so for this  Patient vapes and smokes marijuana on regularly      Current Medications  Current Outpatient Medications   Medication Sig Dispense Refill    cetirizine (ZyrTEC) 10 mg tablet Take 10 mg by mouth daily      citalopram (CeleXA) 40 mg tablet Take 40 mg by mouth daily      fluticasone (FLOVENT HFA) 110 MCG/ACT inhaler Inhale 2 puffs 2 (two) times a day Rinse mouth after use   ibuprofen (MOTRIN) 600 mg tablet Take 1 tablet (600 mg total) by mouth every 6 (six) hours as needed for mild pain 30 tablet 0    levothyroxine 100 mcg tablet Take 100 mcg by mouth daily      norgestimate-ethinyl estradiol (ORTHO-CYCLEN) 0 25-35 MG-MCG per tablet Take 1 tablet by mouth daily      ondansetron (ZOFRAN) 4 mg tablet Take 1 tablet (4 mg total) by mouth every 8 (eight) hours as needed for nausea or vomiting 20 tablet 0    predniSONE 1 mg tablet Take 1 mg by mouth daily      QUEtiapine (SEROquel) 100 mg tablet Take 100 mg by mouth daily at bedtime       No current facility-administered medications for this visit  Current Problems    Active Problems: There are no active problems to display for this patient          Review of Systems:    General: negative for - chills, fatigue, fever,  weight gain or weight loss  Psychological: negative for - anxiety, behavioral disorder, concentration difficulties  Ophthalmic: negative for - blurry vision, decreased vision, double vision,      Past Medical History:   Past Medical History:   Diagnosis Date    Asthma     Bipolar 1 disorder ([facility] [address] )     Depression     Disease of thyroid gland     Nerve disorder     Pet allergy     Seasonal allergies        Past Surgical History:   Past Surgical History:   Procedure Laterality Date    WISDOM TOOTH EXTRACTION         Family History:  Family history reviewed and non-contributory  Family History   Problem Relation Age of Onset    Pneumonia Mother     Multiple sclerosis Father        Social History:  Social History     Socioeconomic History    Marital status: Single     Spouse name: None    Number of children: None    Years of education: None    Highest education level: None   Occupational History    None   Social Needs    Financial resource strain: None    Food insecurity:     Worry: None     Inability: None    Transportation needs:     Medical: None     Non-medical: None   Tobacco Use    Smoking status: Current Every Day Smoker     Types: E-Cigarettes    Smokeless tobacco: Never Used   Substance and Sexual Activity    Alcohol use: None    Drug use: Yes     Types: Marijuana    Sexual activity: None   Lifestyle    Physical activity:     Days per week: None     Minutes per session: None    Stress: None   Relationships    Social connections:     Talks on phone: None     Gets together: None     Attends Jewish service: None     Active member of club or organization: None     Attends meetings of clubs or organizations: None     Relationship status: None    Intimate partner violence:     Fear of current or ex partner: None     Emotionally abused: None     Physically abused: None     Forced sexual activity: None   Other Topics Concern    None   Social History Narrative    None       Allergies:   No Known Allergies        Physical ExaminationBP 111/74   Pulse 98   Ht 5' 4" (1 626 m)   Wt 86 2 kg (190 lb)   BMI 32 61 kg/m²   Gen: Alert and oriented to person, place, time  HEENT: EOMI, eyes clear, moist mucus membranes, hearing intact      Orthopedic Exam   right knee examination   minimal effusion   apprehension positive   tenderness present over the MPFL   MRI shows lateral femoral condyle lesion with a shallow trochlear notch          Impression   recurrent dislocation right  patella        Plan     discussed treatment with the patient   would like to consider surgical intervention   options are arthroscopy and mini open MPFL reconstruction   patient aware that the results are not guaranteed and she could have persistent pain from her chondral lesion and also some element of pain coming from a spine problem   Discussed treatment options with the patient, which include no further treatment, continue non-operative measures, or surgical intervention  Risks and benefits of these treatment options discussed in detail  Patient informed that the risks of surgery include infection, bleeding, injury to blood vessels, injury to nerves, injury to adjacent structures, failure of the procedure, need for additional surgery, and potential loss of life and limb  This patient has failed non-operative measures and wishes to proceed with surgical intervention  Informed consent obtained  A copy of the consultation today has been given to the patient, and patient will call with any concerns or questions  Patient given the option to cancel the surgery or get a second opinion between now and the day of surgery  Almas Dobson MD        Portions of the record may have been created with voice recognition software  Occasional wrong word or "sound a like" substitutions may have occurred due to the inherent limitations of voice recognition software  Read the chart carefully and recognize, using context, where substitutions have occurred

## 2019-09-04 NOTE — H&P
Chief Complaint   right knee pain    History Of Presenting Illness  Tamiko Guerrero 1995 presents with  Right knee pain due to recurrent patellar dislocation  Patient initially dislocated right patella 10 years ago  Since then patient has had over 20-30 subluxation episodes  Patient has sense of instability in the right knee  Symptoms not improved with bracing and physical therapy  Patient had recent spine surgery and has a been under the care of Dr Liliana Lovett so for this  Patient vapes and smokes marijuana on regularly      Current Medications  Current Outpatient Medications   Medication Sig Dispense Refill    cetirizine (ZyrTEC) 10 mg tablet Take 10 mg by mouth daily      citalopram (CeleXA) 40 mg tablet Take 40 mg by mouth daily      fluticasone (FLOVENT HFA) 110 MCG/ACT inhaler Inhale 2 puffs 2 (two) times a day Rinse mouth after use   ibuprofen (MOTRIN) 600 mg tablet Take 1 tablet (600 mg total) by mouth every 6 (six) hours as needed for mild pain 30 tablet 0    levothyroxine 100 mcg tablet Take 100 mcg by mouth daily      norgestimate-ethinyl estradiol (ORTHO-CYCLEN) 0 25-35 MG-MCG per tablet Take 1 tablet by mouth daily      ondansetron (ZOFRAN) 4 mg tablet Take 1 tablet (4 mg total) by mouth every 8 (eight) hours as needed for nausea or vomiting 20 tablet 0    predniSONE 1 mg tablet Take 1 mg by mouth daily      QUEtiapine (SEROquel) 100 mg tablet Take 100 mg by mouth daily at bedtime       No current facility-administered medications for this visit  Current Problems    Active Problems: There are no active problems to display for this patient          Review of Systems:    General: negative for - chills, fatigue, fever,  weight gain or weight loss  Psychological: negative for - anxiety, behavioral disorder, concentration difficulties  Ophthalmic: negative for - blurry vision, decreased vision, double vision,      Past Medical History:   Past Medical History:   Diagnosis Date    Asthma     Bipolar 1 disorder ([facility] [address] )     Depression     Disease of thyroid gland     Nerve disorder     Pet allergy     Seasonal allergies        Past Surgical History:   Past Surgical History:   Procedure Laterality Date    WISDOM TOOTH EXTRACTION         Family History:  Family history reviewed and non-contributory  Family History   Problem Relation Age of Onset    Pneumonia Mother     Multiple sclerosis Father        Social History:  Social History     Socioeconomic History    Marital status: Single     Spouse name: None    Number of children: None    Years of education: None    Highest education level: None   Occupational History    None   Social Needs    Financial resource strain: None    Food insecurity:     Worry: None     Inability: None    Transportation needs:     Medical: None     Non-medical: None   Tobacco Use    Smoking status: Current Every Day Smoker     Types: E-Cigarettes    Smokeless tobacco: Never Used   Substance and Sexual Activity    Alcohol use: None    Drug use: Yes     Types: Marijuana    Sexual activity: None   Lifestyle    Physical activity:     Days per week: None     Minutes per session: None    Stress: None   Relationships    Social connections:     Talks on phone: None     Gets together: None     Attends Spiritism service: None     Active member of club or organization: None     Attends meetings of clubs or organizations: None     Relationship status: None    Intimate partner violence:     Fear of current or ex partner: None     Emotionally abused: None     Physically abused: None     Forced sexual activity: None   Other Topics Concern    None   Social History Narrative    None       Allergies:   No Known Allergies        Physical ExaminationBP 111/74   Pulse 98   Ht 5' 4" (1 626 m)   Wt 86 2 kg (190 lb)   BMI 32 61 kg/m²    Gen: Alert and oriented to person, place, time  HEENT: EOMI, eyes clear, moist mucus membranes, hearing intact      Orthopedic Exam   right knee examination   minimal effusion   apprehension positive   tenderness present over the MPFL   MRI shows lateral femoral condyle lesion with a shallow trochlear notch          Impression   recurrent dislocation right  patella        Plan     discussed treatment with the patient   would like to consider surgical intervention   options are arthroscopy and mini open MPFL reconstruction   patient aware that the results are not guaranteed and she could have persistent pain from her chondral lesion and also some element of pain coming from a spine problem   Discussed treatment options with the patient, which include no further treatment, continue non-operative measures, or surgical intervention  Risks and benefits of these treatment options discussed in detail  Patient informed that the risks of surgery include infection, bleeding, injury to blood vessels, injury to nerves, injury to adjacent structures, failure of the procedure, need for additional surgery, and potential loss of life and limb  This patient has failed non-operative measures and wishes to proceed with surgical intervention  Informed consent obtained  A copy of the consultation today has been given to the patient, and patient will call with any concerns or questions  Patient given the option to cancel the surgery or get a second opinion between now and the day of surgery  Ila Rodriguez MD        Portions of the record may have been created with voice recognition software  Occasional wrong word or "sound a like" substitutions may have occurred due to the inherent limitations of voice recognition software  Read the chart carefully and recognize, using context, where substitutions have occurred

## 2019-09-11 NOTE — PRE-PROCEDURE INSTRUCTIONS
Pre-Surgery Instructions:   Medication Instructions    cetirizine (ZyrTEC) 10 mg tablet Instructed patient per Anesthesia Guidelines   citalopram (CeleXA) 40 mg tablet Instructed patient per Anesthesia Guidelines   fluticasone (FLOVENT HFA) 110 MCG/ACT inhaler Instructed patient per Anesthesia Guidelines   ibuprofen (MOTRIN) 600 mg tablet Instructed patient per Anesthesia Guidelines   levothyroxine 100 mcg tablet Instructed patient per Anesthesia Guidelines   norgestimate-ethinyl estradiol (ORTHO-CYCLEN) 0 25-35 MG-MCG per tablet Instructed patient per Anesthesia Guidelines   ondansetron (ZOFRAN) 4 mg tablet Instructed patient per Anesthesia Guidelines   predniSONE 1 mg tablet Instructed patient per Anesthesia Guidelines   QUEtiapine (SEROquel) 100 mg tablet Instructed patient per Anesthesia Guidelines

## 2019-09-12 ENCOUNTER — TELEPHONE (OUTPATIENT)
Dept: OBGYN CLINIC | Facility: CLINIC | Age: 24
End: 2019-09-12

## 2019-09-15 ENCOUNTER — ANESTHESIA EVENT (OUTPATIENT)
Dept: PERIOP | Facility: HOSPITAL | Age: 24
End: 2019-09-15
Payer: COMMERCIAL

## 2019-09-16 ENCOUNTER — ANESTHESIA (OUTPATIENT)
Dept: PERIOP | Facility: HOSPITAL | Age: 24
End: 2019-09-16
Payer: COMMERCIAL

## 2019-09-16 ENCOUNTER — HOSPITAL ENCOUNTER (OUTPATIENT)
Facility: HOSPITAL | Age: 24
Setting detail: OUTPATIENT SURGERY
Discharge: HOME | End: 2019-09-16
Attending: ORTHOPAEDIC SURGERY | Admitting: ORTHOPAEDIC SURGERY
Payer: COMMERCIAL

## 2019-09-16 ENCOUNTER — APPOINTMENT (OUTPATIENT)
Dept: RADIOLOGY | Facility: HOSPITAL | Age: 24
End: 2019-09-16
Payer: COMMERCIAL

## 2019-09-16 VITALS
DIASTOLIC BLOOD PRESSURE: 70 MMHG | WEIGHT: 190 LBS | BODY MASS INDEX: 32.44 KG/M2 | RESPIRATION RATE: 18 BRPM | HEIGHT: 64 IN | TEMPERATURE: 98 F | HEART RATE: 88 BPM | OXYGEN SATURATION: 97 % | SYSTOLIC BLOOD PRESSURE: 121 MMHG

## 2019-09-16 DIAGNOSIS — S83.004D CLOSED DISLOCATION OF RIGHT PATELLA, SUBSEQUENT ENCOUNTER: Primary | ICD-10-CM

## 2019-09-16 PROCEDURE — C1713 ANCHOR/SCREW BN/BN,TIS/BN: HCPCS | Performed by: ORTHOPAEDIC SURGERY

## 2019-09-16 PROCEDURE — 27427 RECONSTRUCTION KNEE: CPT | Performed by: ORTHOPAEDIC SURGERY

## 2019-09-16 PROCEDURE — 27427 RECONSTRUCTION KNEE: CPT | Performed by: PHYSICIAN ASSISTANT

## 2019-09-16 PROCEDURE — 73560 X-RAY EXAM OF KNEE 1 OR 2: CPT

## 2019-09-16 DEVICE — IMPLANT SYSTEM, MPFL, BIOCOMPOSITE
Type: IMPLANTABLE DEVICE | Site: KNEE | Status: FUNCTIONAL
Brand: ARTHREX®

## 2019-09-16 DEVICE — GRAFT TENDON PERONEOUS LONGUS FRZN: Type: IMPLANTABLE DEVICE | Site: KNEE | Status: FUNCTIONAL

## 2019-09-16 RX ORDER — ASPIRIN 325 MG
325 TABLET, DELAYED RELEASE (ENTERIC COATED) ORAL EVERY 12 HOURS
Qty: 60 TABLET | Refills: 0 | Status: SHIPPED | OUTPATIENT
Start: 2019-09-16 | End: 2019-11-13

## 2019-09-16 RX ORDER — FENTANYL CITRATE/PF 50 MCG/ML
25 SYRINGE (ML) INJECTION
Status: DISCONTINUED | OUTPATIENT
Start: 2019-09-16 | End: 2019-09-16 | Stop reason: HOSPADM

## 2019-09-16 RX ORDER — LIDOCAINE HYDROCHLORIDE 20 MG/ML
INJECTION, SOLUTION INFILTRATION; PERINEURAL
Status: COMPLETED | OUTPATIENT
Start: 2019-09-16 | End: 2019-09-16

## 2019-09-16 RX ORDER — OXYCODONE HYDROCHLORIDE AND ACETAMINOPHEN 5; 325 MG/1; MG/1
1 TABLET ORAL EVERY 6 HOURS PRN
Qty: 20 TABLET | Refills: 0 | Status: SHIPPED | OUTPATIENT
Start: 2019-09-16 | End: 2019-11-18 | Stop reason: HOSPADM

## 2019-09-16 RX ORDER — BUPIVACAINE HYDROCHLORIDE AND EPINEPHRINE 2.5; 5 MG/ML; UG/ML
INJECTION, SOLUTION INFILTRATION; PERINEURAL AS NEEDED
Status: DISCONTINUED | OUTPATIENT
Start: 2019-09-16 | End: 2019-09-16 | Stop reason: HOSPADM

## 2019-09-16 RX ORDER — CHLORHEXIDINE GLUCONATE 4 G/100ML
SOLUTION TOPICAL DAILY PRN
Status: DISCONTINUED | OUTPATIENT
Start: 2019-09-16 | End: 2019-09-16 | Stop reason: HOSPADM

## 2019-09-16 RX ORDER — HYDROMORPHONE HYDROCHLORIDE 2 MG/ML
INJECTION, SOLUTION INTRAMUSCULAR; INTRAVENOUS; SUBCUTANEOUS AS NEEDED
Status: DISCONTINUED | OUTPATIENT
Start: 2019-09-16 | End: 2019-09-16 | Stop reason: SURG

## 2019-09-16 RX ORDER — MIDAZOLAM HYDROCHLORIDE 1 MG/ML
INJECTION INTRAMUSCULAR; INTRAVENOUS AS NEEDED
Status: DISCONTINUED | OUTPATIENT
Start: 2019-09-16 | End: 2019-09-16 | Stop reason: SURG

## 2019-09-16 RX ORDER — HYDROMORPHONE HCL/PF 1 MG/ML
0.5 SYRINGE (ML) INJECTION
Status: DISCONTINUED | OUTPATIENT
Start: 2019-09-16 | End: 2019-09-16 | Stop reason: HOSPADM

## 2019-09-16 RX ORDER — PROPOFOL 10 MG/ML
INJECTION, EMULSION INTRAVENOUS AS NEEDED
Status: DISCONTINUED | OUTPATIENT
Start: 2019-09-16 | End: 2019-09-16 | Stop reason: SURG

## 2019-09-16 RX ORDER — METOCLOPRAMIDE HYDROCHLORIDE 5 MG/ML
INJECTION INTRAMUSCULAR; INTRAVENOUS AS NEEDED
Status: DISCONTINUED | OUTPATIENT
Start: 2019-09-16 | End: 2019-09-16 | Stop reason: SURG

## 2019-09-16 RX ORDER — ONDANSETRON 2 MG/ML
INJECTION INTRAMUSCULAR; INTRAVENOUS AS NEEDED
Status: DISCONTINUED | OUTPATIENT
Start: 2019-09-16 | End: 2019-09-16 | Stop reason: SURG

## 2019-09-16 RX ORDER — DEXAMETHASONE SODIUM PHOSPHATE 10 MG/ML
INJECTION, SOLUTION INTRAMUSCULAR; INTRAVENOUS AS NEEDED
Status: DISCONTINUED | OUTPATIENT
Start: 2019-09-16 | End: 2019-09-16 | Stop reason: SURG

## 2019-09-16 RX ORDER — CEFAZOLIN SODIUM 2 G/50ML
2000 SOLUTION INTRAVENOUS ONCE
Status: COMPLETED | OUTPATIENT
Start: 2019-09-16 | End: 2019-09-16

## 2019-09-16 RX ORDER — KETOROLAC TROMETHAMINE 30 MG/ML
INJECTION, SOLUTION INTRAMUSCULAR; INTRAVENOUS AS NEEDED
Status: DISCONTINUED | OUTPATIENT
Start: 2019-09-16 | End: 2019-09-16 | Stop reason: SURG

## 2019-09-16 RX ORDER — LIDOCAINE HYDROCHLORIDE 10 MG/ML
INJECTION, SOLUTION INFILTRATION; PERINEURAL AS NEEDED
Status: DISCONTINUED | OUTPATIENT
Start: 2019-09-16 | End: 2019-09-16 | Stop reason: SURG

## 2019-09-16 RX ORDER — NICOTINE 21 MG/24HR
21 PATCH, TRANSDERMAL 24 HOURS TRANSDERMAL DAILY
Status: DISCONTINUED | OUTPATIENT
Start: 2019-09-17 | End: 2019-09-16 | Stop reason: HOSPADM

## 2019-09-16 RX ORDER — FENTANYL CITRATE 50 UG/ML
INJECTION, SOLUTION INTRAMUSCULAR; INTRAVENOUS AS NEEDED
Status: DISCONTINUED | OUTPATIENT
Start: 2019-09-16 | End: 2019-09-16 | Stop reason: SURG

## 2019-09-16 RX ORDER — ALBUTEROL SULFATE 90 UG/1
2 AEROSOL, METERED RESPIRATORY (INHALATION) EVERY 6 HOURS PRN
COMMUNITY

## 2019-09-16 RX ORDER — ROPIVACAINE HYDROCHLORIDE 5 MG/ML
INJECTION, SOLUTION EPIDURAL; INFILTRATION; PERINEURAL
Status: COMPLETED | OUTPATIENT
Start: 2019-09-16 | End: 2019-09-16

## 2019-09-16 RX ORDER — ONDANSETRON 2 MG/ML
4 INJECTION INTRAMUSCULAR; INTRAVENOUS ONCE AS NEEDED
Status: DISCONTINUED | OUTPATIENT
Start: 2019-09-16 | End: 2019-09-16 | Stop reason: HOSPADM

## 2019-09-16 RX ORDER — SODIUM CHLORIDE, SODIUM LACTATE, POTASSIUM CHLORIDE, CALCIUM CHLORIDE 600; 310; 30; 20 MG/100ML; MG/100ML; MG/100ML; MG/100ML
125 INJECTION, SOLUTION INTRAVENOUS CONTINUOUS
Status: DISCONTINUED | OUTPATIENT
Start: 2019-09-16 | End: 2019-09-16 | Stop reason: HOSPADM

## 2019-09-16 RX ORDER — GLYCOPYRROLATE 0.2 MG/ML
INJECTION INTRAMUSCULAR; INTRAVENOUS AS NEEDED
Status: DISCONTINUED | OUTPATIENT
Start: 2019-09-16 | End: 2019-09-16 | Stop reason: SURG

## 2019-09-16 RX ORDER — OXYCODONE HYDROCHLORIDE AND ACETAMINOPHEN 5; 325 MG/1; MG/1
2 TABLET ORAL EVERY 4 HOURS PRN
Status: DISCONTINUED | OUTPATIENT
Start: 2019-09-16 | End: 2019-09-16 | Stop reason: HOSPADM

## 2019-09-16 RX ORDER — MAGNESIUM HYDROXIDE 1200 MG/15ML
LIQUID ORAL AS NEEDED
Status: DISCONTINUED | OUTPATIENT
Start: 2019-09-16 | End: 2019-09-16 | Stop reason: HOSPADM

## 2019-09-16 RX ORDER — ALBUTEROL SULFATE 90 UG/1
AEROSOL, METERED RESPIRATORY (INHALATION) AS NEEDED
Status: DISCONTINUED | OUTPATIENT
Start: 2019-09-16 | End: 2019-09-16 | Stop reason: SURG

## 2019-09-16 RX ADMIN — FENTANYL CITRATE 50 MCG: 50 INJECTION, SOLUTION INTRAMUSCULAR; INTRAVENOUS at 13:47

## 2019-09-16 RX ADMIN — SODIUM CHLORIDE, SODIUM LACTATE, POTASSIUM CHLORIDE, AND CALCIUM CHLORIDE 125 ML/HR: .6; .31; .03; .02 INJECTION, SOLUTION INTRAVENOUS at 13:37

## 2019-09-16 RX ADMIN — HYDROMORPHONE HYDROCHLORIDE 0.5 MG: 2 INJECTION, SOLUTION INTRAMUSCULAR; INTRAVENOUS; SUBCUTANEOUS at 15:55

## 2019-09-16 RX ADMIN — MIDAZOLAM HYDROCHLORIDE 2 MG: 1 INJECTION, SOLUTION INTRAMUSCULAR; INTRAVENOUS at 13:47

## 2019-09-16 RX ADMIN — ONDANSETRON HYDROCHLORIDE 4 MG: 2 INJECTION, SOLUTION INTRAVENOUS at 14:27

## 2019-09-16 RX ADMIN — CEFAZOLIN SODIUM 2000 MG: 2 SOLUTION INTRAVENOUS at 14:05

## 2019-09-16 RX ADMIN — ALBUTEROL SULFATE 2 PUFF: 90 AEROSOL, METERED RESPIRATORY (INHALATION) at 14:05

## 2019-09-16 RX ADMIN — GLYCOPYRROLATE 0.2 MG: 0.2 INJECTION, SOLUTION INTRAMUSCULAR; INTRAVENOUS at 14:29

## 2019-09-16 RX ADMIN — KETOROLAC TROMETHAMINE 30 MG: 30 INJECTION, SOLUTION INTRAMUSCULAR; INTRAVENOUS at 14:27

## 2019-09-16 RX ADMIN — DEXAMETHASONE SODIUM PHOSPHATE 10 MG: 10 INJECTION, SOLUTION INTRAMUSCULAR; INTRAVENOUS at 14:24

## 2019-09-16 RX ADMIN — FENTANYL CITRATE 25 MCG: 50 INJECTION, SOLUTION INTRAMUSCULAR; INTRAVENOUS at 14:30

## 2019-09-16 RX ADMIN — METOCLOPRAMIDE HYDROCHLORIDE 10 MG: 5 INJECTION INTRAMUSCULAR; INTRAVENOUS at 15:52

## 2019-09-16 RX ADMIN — PROPOFOL 200 MG: 10 INJECTION, EMULSION INTRAVENOUS at 14:17

## 2019-09-16 RX ADMIN — LIDOCAINE HYDROCHLORIDE 50 MG: 10 INJECTION, SOLUTION INFILTRATION; PERINEURAL at 14:17

## 2019-09-16 RX ADMIN — ROPIVACAINE HYDROCHLORIDE 30 ML: 5 INJECTION, SOLUTION EPIDURAL; INFILTRATION; PERINEURAL at 13:50

## 2019-09-16 RX ADMIN — SODIUM CHLORIDE, SODIUM LACTATE, POTASSIUM CHLORIDE, AND CALCIUM CHLORIDE: .6; .31; .03; .02 INJECTION, SOLUTION INTRAVENOUS at 15:52

## 2019-09-16 RX ADMIN — LIDOCAINE HYDROCHLORIDE 2 ML: 20 INJECTION, SOLUTION INFILTRATION; PERINEURAL at 13:50

## 2019-09-16 RX ADMIN — FENTANYL CITRATE 25 MCG: 50 INJECTION, SOLUTION INTRAMUSCULAR; INTRAVENOUS at 14:25

## 2019-09-16 NOTE — ANESTHESIA PROCEDURE NOTES
Peripheral Block    Patient location during procedure: pre-op  Start time: 9/16/2019 1:50 PM  Reason for block: at surgeon's request and post-op pain management  Staffing  Anesthesiologist: Aisha Prasad MD  Performed: anesthesiologist   Preanesthetic Checklist  Completed: patient identified, site marked, surgical consent, pre-op evaluation, timeout performed, IV checked, risks and benefits discussed and monitors and equipment checked  Peripheral Block  Patient position: supine  Prep: ChloraPrep  Patient monitoring: heart rate, cardiac monitor, continuous pulse ox and frequent blood pressure checks  Block type: femoral  Laterality: right  Injection technique: single-shot  Procedures: ultrasound guided, Ultrasound guidance required for the procedure to increase accuracy and safety of medication placement and decrease risk of complications    Ultrasound permanent image savedropivacaine (NAROPIN) 0 5 % perineural infiltration, 30 mL  lidocaine (XYLOCAINE) 2 % infiltration, 2 mL  Needle  Needle type: Stimuplex   Needle gauge: 22 G (2 inch)  Needle localization: ultrasound guidance  Assessment  Injection assessment: incremental injection, local visualized surrounding nerve on ultrasound, negative aspiration for CSF, negative aspiration for heme and transient paresthesias  Paresthesia pain: immediately resolved  Heart rate change: no  Slow fractionated injection: yes  Post-procedure:  site cleaned  patient tolerated the procedure well with no immediate complications  Additional Notes  See pre-op note for details of patient  Pt given 2 mg versed, 50 mcg fentanyl - relaxes, cooperative and conversational  I initially placed 15 cc local underneath the nerve; then, pulled back and repositioned on top; during slow injection of 1 cc she had a parasthesia - she described it as pins and needles, and it resolved about 2-3 seconds after withdrawing; when this was resolved, I repositioned the needle just to the side and above the nerve; slowly injected 1 cc without issue, then completed the block there  Good spread of local  Of note, there did appear to be an arterial branch near the nerve passing laterally - this was avoided  She tolerated the procedure well; she only had the one brief parasthesia as described above  She had good block onset, and had clear muscle weakness in her quads, and pain relief in her knee      Negative aspiration heme q5cc injection local     Pt 86 kg - max dose ropiv is 86 x 3 = 258 (max 250); she got 30 cc 5mg/cc = 150 mg, well below allowable dose

## 2019-09-16 NOTE — OP NOTE
OPERATIVE REPORT  PATIENT NAME: Franki Su    :  1995  MRN: [id number]  Pt Location: [facility] OR ROOM 01    SURGERY DATE: 2019    Surgeon(s) and Role:     * Bin Cooper MD - Primary     * Autumn Sparrow PA-C - Assisting no qualified resident available, physician assistant helped medically necessary for camera positioning wound exposure manipulation of limb wound closure dressing application all under my direct supervision    Preop Diagnosis:  Closed dislocation of right patella, subsequent encounter [S83 004D]    Post-Op Diagnosis Codes:     * Closed dislocation of right patella, subsequent encounter [S83 004D]    Procedure(s) (LRB):  ARTHROSCOPY KNEE  arthroscopy of right knee 1st, mini open right MPFL reconstruction with allograft (Right)    Specimen(s):  * No specimens in log *    Estimated Blood Loss:   Minimal    Drains:  * No LDAs found *    Anesthesia Type:   General w/ Femoral Block    Operative Indications:  Closed dislocation of right patella, subsequent encounter [S83 004D]  Multiple dislocations right patella    Operative Findings:  Patella easily subsequent in flexible beyond the lateral distal femur  Arthroscopy synovitis and subluxed patella documented  Intact medial lateral menisci intact ACL PCL intact chondral surfaces  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 , chondroplasty done    Complications:   None    Procedure and Technique: All treatment options were discussed with the patient including non-operative and operative treatment options  Patient has failed non-perative treatment and has opted for surgical intervention  Risks, complications and benefits of all treatment options were discussed in detail  The risks of surgical intervention including infection, injury to vessels and nerves  risk of failure to achieve desired results, risk of need for further procedures, potential risk of loss of life and limb were discussed with the patient    Informed consent was obtained from the patient  The operative site was marked and signed  A timeout was performed prior to the procedure  The patient was re-identified ,including name, date of birth, procedure, consent form reviewed, site and laterality  Appropriate antibiotics were administered preoperatively    The Physician Assistant was present for the entire case and provided essential assistance with patient positioning, prep and draping, wound closure, sterile dressing and splint application, all under my direct supervision(there was no resident available to assist with this case)    Implant Name Type Inv  Item Serial No   Lot No  LRB No  Used   IMPLANT MPFL BIOCOMPOSITE - [id number]  IMPLANT MPFL BIOCOMPOSITE  [employer] [id number] Right 1   /[id number]/  GRAFT TENDON PERONEOUS LONGUS /[id number]/ MUSCULOSKELETAL TRANSPLAN  Right 1       Right limb was prepared and draped in sterile fashion  Patella was easily subluxable beyond lateral edge of the femoral condyle  Very unstable patella  Standard medial lateral arthroscopic portals used  Synovectomy performed  Patellar documented to be tracking laterally and easily subluxable  Absent MPFL  Synovectomy performed chondroplasty done of the lateral patellar facet  With an proceed with open part of the procedure  The proximal medial half of the patella was exposed  Excess cement was tagged  The layer between the capsule and the vastus medialis was developed  Good access to the medial patella was obtained  Two guide pins were inserted under fluoroscopy control good position in AP and lateral views the 1st 1 1 cm from the distal pole 2nd 1 1 cm distal to the 1st 1  Once this was satisfactory the drilled to depth of 25 mm  On the  back table peroneus allograft was prepared to a length of 20 cm  The end diameters reduced to around 4 mm  These were then whipstitched    With the aid of anchors the 2 whipstitched ends  were anchored into the patella good secure fixation was obtained  This was then looped and the  suture was then transferred down to the site of the insertion of the MPFL    Guide wire was inserted fluoroscopy control with the aid of the jig into the MPFL  origin site  This was checked on fluoroscopy in AP and lateral views  Once this was satisfactory a skin incision was made and blunt dissections carried down to the site of the pin, appropriate Reamer was then drilled up to the lateral cortex  The MPFL double bundle graft was then shuttled to the site of the origin  The sutures then pulled out to the lateral cortex  While the patella has held reduced and 30° of flexion with the lateral border of the patella lined up with the lateral aspect of the patellofemoral joint tension was applied and the MPFL graft was secured in the previously drilled hole after it was dunked in with  with a BioScrew excellent fixation was obtained this was checked on fluoroscopy  MPFL was then checked , patella tracked well   Wound was  irrigated the extensor rent which was created for the procedure was repaired with FiberWire suture  Layered closure was performed with Monocryl to skin sterile dressing splint applied    No complications patient tolerated procedure well     I was present for the entire procedure    Patient Disposition:  PACU     SIGNATURE: Ulisses Coker MD  DATE: September 16, 2019  TIME: 4:04 PM

## 2019-09-16 NOTE — ANESTHESIA PREPROCEDURE EVALUATION
Review of Systems/Medical History      No history of anesthetic complications     Cardiovascular  Negative cardio ROS    Pulmonary  Asthma , well controlled/ stable ,   Comment: Pt on daily steroid for asthma; she takes an inhaler daily, and for rescue; she has been hospitalized in the past for asthma - she states that currently it is under good control     GI/Hepatic  Negative GI/hepatic ROS          Negative  ROS        Endo/Other  History of thyroid disease ,      GYN       Hematology   Musculoskeletal    Comment: R patella    L4/5 disc bulge and surgical correction - she does still has parasthesias in that distribution, and gets injections      Neurology   Psychology       Comment: bipolar         Physical Exam    Airway    Mallampati score: III  TM Distance: >3 FB  Neck ROM: full     Dental       Cardiovascular  Comment: Negative ROS,     Pulmonary      Other Findings        Anesthesia Plan  ASA Score- 2     Anesthesia Type- general with ASA Monitors  Additional Monitors:   Airway Plan: LMA  Comment: General anesthesia, LMA; standard ASA monitors  Risks and benefits discussed with patient; patient consented and agrees to proceed  I saw and evaluated the patient  If seen with CRNA, we have discussed the anesthetic plan and I am in agreement that the plan is appropriate for the patient  Right femoral nerve block requested by surgeon for post-operative pain control; I discussed risks with patient, including bleeding, infection, nerve damage, failed block; she understands, agrees to proceed  I did discuss that she may be at increased risk of permanent nerve injury given that she already has parasthesias in that distribution, but that this is low risks overall and difficult to quantify  upt neg 916/19  Plan Factors-    Induction- intravenous  Postoperative Plan- Plan for postoperative opioid use   Planned trial extubation    Informed Consent- Anesthetic plan and risks discussed with patient  I personally reviewed this patient with the CRNA  Discussed and agreed on the Anesthesia Plan with the CRNA  Ila Braun

## 2019-09-16 NOTE — PROGRESS NOTES
PT Discharge  Today's date: 2019  Patient name: Valencia Centeno  : 1995  MRN: [id number]  Referring provider: Roberto Carranza MD  Dx:   Encounter Diagnosis     ICD-10-CM    1  Closed patellar dislocation, right, initial encounter F10 660R    2  Difficulty walking R26 2    3  Acute pain of right knee M25 561      Assessment/Plan    Subjective    Objective     2019  Valencia Centeno has not returned for more treatment  Valencia Centeno did not attend today's appointment  I cannot provide you with a current progress report but I can provide you with information based on previous performance  Valencia Centeno is discharged at this time

## 2019-09-16 NOTE — ANESTHESIA POSTPROCEDURE EVALUATION
Post-Op Assessment Note    CV Status:  Stable  Pain Score: 0    Pain management: adequate     Mental Status:  Alert and awake   Hydration Status:  Euvolemic   PONV Controlled:  Controlled   Airway Patency:  Patent   Post Op Vitals Reviewed: Yes      Staff: Anesthesiologist, CRNA           BP   123/64   Temp   99 3   Pulse  110   Resp   20   SpO2   99%

## 2019-09-16 NOTE — DISCHARGE INSTRUCTIONS
ELEVATE LIMB  ICE SURGICAL SITE EVERY 4 HOURS TO AFFECTED SITE  KEEP DRESSINGS CLEAN AND INTACT  CALL DR HAMILTON AT [facility] [phone] TO SCHEDULE POSTOP APPOINTMENT  CALL DR HAMILTON AT  [facility] [phone] FOR ANY CONCERNS OR QUESTIONS OR PROBLEMS      Keep dressings on till seen in the office  Weightbear toe-touch with crutches  Aspirin 325 mg twice daily b i d   For 30 days for DVT prophylaxis  Patient counseled on smoking cessation and prescribe Nicoderm patches  Follow-up in the office in 1 week to start physical therapy

## 2019-09-23 ENCOUNTER — TELEPHONE (OUTPATIENT)
Dept: OBGYN CLINIC | Facility: HOSPITAL | Age: 24
End: 2019-09-23

## 2019-09-23 DIAGNOSIS — S83.004D CLOSED DISLOCATION OF RIGHT PATELLA, SUBSEQUENT ENCOUNTER: Primary | ICD-10-CM

## 2019-09-23 RX ORDER — OXYCODONE HYDROCHLORIDE AND ACETAMINOPHEN 5; 325 MG/1; MG/1
1 TABLET ORAL EVERY 8 HOURS PRN
Qty: 21 TABLET | Refills: 0 | Status: SHIPPED | OUTPATIENT
Start: 2019-09-23 | End: 2019-11-18 | Stop reason: HOSPADM

## 2019-09-23 NOTE — TELEPHONE ENCOUNTER
Please be advise I spoke to pt  Who stated she davide like to speak to Dr Patricia Hancock  Pt  Has questions and concerns for upcomming injection to her back       Thanks

## 2019-09-23 NOTE — TELEPHONE ENCOUNTER
[facility]  [phone]    Dr Theadore Kocher    Patient is asking for refill of Oxycodone, If unable to get any other suggestions because pain level is still 6-8  Also needing call back about Pain Injection she is receiving prior to PO with you in back, if it is ok

## 2019-09-27 ENCOUNTER — OFFICE VISIT (OUTPATIENT)
Dept: OBGYN CLINIC | Facility: CLINIC | Age: 24
End: 2019-09-27

## 2019-09-27 VITALS
HEIGHT: 64 IN | SYSTOLIC BLOOD PRESSURE: 122 MMHG | DIASTOLIC BLOOD PRESSURE: 72 MMHG | HEART RATE: 121 BPM | WEIGHT: 190 LBS | BODY MASS INDEX: 32.44 KG/M2

## 2019-09-27 DIAGNOSIS — Z48.89 AFTERCARE FOLLOWING SURGERY: Primary | ICD-10-CM

## 2019-09-27 PROCEDURE — 99024 POSTOP FOLLOW-UP VISIT: CPT | Performed by: ORTHOPAEDIC SURGERY

## 2019-09-27 NOTE — PROGRESS NOTES
Chief Complaint  S/p right knee surgery     History Of Presenting Illness  Murlean Osler 1995 presents s/p right knee a/mini open MPFL reconstruction with allograft 9/16/19  First post op visit  Pain is well controlled with post op pain medication  Sutures remain in  She is following restrictions NWB with crutches and wearing immobilizer  She is also having back pain and did get injection in lower back for radicular symptoms, yesterday  Denies numbness/tingling/fever/chills  Current Medications  Current Outpatient Medications   Medication Sig Dispense Refill    albuterol (PROVENTIL HFA,VENTOLIN HFA) 90 mcg/act inhaler Inhale 2 puffs every 6 (six) hours as needed for wheezing      aspirin (ECOTRIN) 325 mg EC tablet Take 1 tablet (325 mg total) by mouth every 12 (twelve) hours 60 tablet 0    cetirizine (ZyrTEC) 10 mg tablet Take 10 mg by mouth daily      citalopram (CeleXA) 40 mg tablet Take 40 mg by mouth daily      fluticasone (FLOVENT HFA) 110 MCG/ACT inhaler Inhale 2 puffs 2 (two) times a day Rinse mouth after use        ibuprofen (MOTRIN) 600 mg tablet Take 1 tablet (600 mg total) by mouth every 6 (six) hours as needed for mild pain 30 tablet 0    levothyroxine 100 mcg tablet Take 100 mcg by mouth daily      norgestimate-ethinyl estradiol (ORTHO-CYCLEN) 0 25-35 MG-MCG per tablet Take 1 tablet by mouth daily      ondansetron (ZOFRAN) 4 mg tablet Take 1 tablet (4 mg total) by mouth every 8 (eight) hours as needed for nausea or vomiting 20 tablet 0    oxyCODONE-acetaminophen (PERCOCET) 5-325 mg per tablet Take 1 tablet by mouth every 6 (six) hours as needed for moderate pain for up to 20 dosesMax Daily Amount: 4 tablets 20 tablet 0    oxyCODONE-acetaminophen (PERCOCET) 5-325 mg per tablet Take 1 tablet by mouth every 8 (eight) hours as needed for moderate pain for up to 21 dosesMax Daily Amount: 3 tablets 21 tablet 0    predniSONE 1 mg tablet Take 1 mg by mouth daily      QUEtiapine (SEROquel) 100 mg tablet Take 100 mg by mouth daily at bedtime       No current facility-administered medications for this visit          Current Problems    Active Problems:   Patient Active Problem List    Diagnosis Date Noted    Closed dislocation of right patella 09/04/2019         Review of Systems:    General: negative for - chills, fatigue, fever,  weight gain or weight loss  Psychological: negative for - anxiety, behavioral disorder, concentration difficulties  Ophthalmic: negative for - blurry vision, decreased vision, double vision,      Past Medical History:   Past Medical History:   Diagnosis Date    Asthma     Bipolar 1 disorder ([address] )     Depression     Disease of thyroid gland     Nerve disorder     Pet allergy     Seasonal allergies        Past Surgical History:   Past Surgical History:   Procedure Laterality Date    BACK SURGERY      AL KNEE SCOPE,FULL SYNOVECT Right 9/16/2019    Procedure: ARTHROSCOPY KNEE  arthroscopy of right knee 1st, mini open right MPFL reconstruction with allograft;  Surgeon: Fab Olivares MD;  Location: [facility];  Service: Orthopedics    WISDOM TOOTH EXTRACTION         Family History:  Family history reviewed and non-contributory  Family History   Problem Relation Age of Onset    Pneumonia Mother     Multiple sclerosis Father        Social History:  Social History     Socioeconomic History    Marital status: Single     Spouse name: Not on file    Number of children: Not on file    Years of education: Not on file    Highest education level: Not on file   Occupational History    Not on file   Social Needs    Financial resource strain: Not on file    Food insecurity:     Worry: Not on file     Inability: Not on file    Transportation needs:     Medical: Not on file     Non-medical: Not on file   Tobacco Use    Smoking status: Current Every Day Smoker     Types: E-Cigarettes    Smokeless tobacco: Current User    Tobacco comment: vapes   Substance and Sexual Activity    Alcohol use: Not Currently    Drug use: Yes     Types: Marijuana    Sexual activity: Not on file   Lifestyle    Physical activity:     Days per week: Not on file     Minutes per session: Not on file    Stress: Not on file   Relationships    Social connections:     Talks on phone: Not on file     Gets together: Not on file     Attends [employer] service: Not on file     Active member of club or organization: Not on file     Attends meetings of clubs or organizations: Not on file     Relationship status: Not on file    Intimate partner violence:     Fear of current or ex partner: Not on file     Emotionally abused: Not on file     Physically abused: Not on file     Forced sexual activity: Not on file   Other Topics Concern    Not on file   Social History Narrative    Not on file       Allergies:   No Known Allergies        Physical ExaminationThere were no vitals taken for this visit  Gen: Alert and oriented to person, place, time  HEENT: EOMI, eyes clear, moist mucus membranes, hearing intact      Orthopedic Exam  Right knee  Well healing portal and incision sites with no active drainage  Mild erythema around surgical sites  Normal post surgical pain  0 extension, flexion not assessed  Patient is able to activate quad muscle  She is not able to do SLR   Calf is soft to palpation  New steri strips applied, sutures removed             Impression  s/p right knee a/mini open MPFL reconstruction with allograft 9/16/19        No new imaging to review     Plan    Patient will be given TROM brace today TROM will be locked in 0  Continue with crutches toe touch WB only  Rehab protocol given today   She will start in physical therapy following MPFL protocol  She will be seen back in 4 weeks with XR  Continue to ice, elevate, rest to control swelling          Moiz Faster        Portions of the record may have been created with voice recognition software    Occasional wrong word or "sound a like" substitutions may have occurred due to the inherent limitations of voice recognition software  Read the chart carefully and recognize, using context, where substitutions have occurred

## 2019-10-07 ENCOUNTER — EVALUATION (OUTPATIENT)
Dept: PHYSICAL THERAPY | Facility: CLINIC | Age: 24
End: 2019-10-07
Payer: COMMERCIAL

## 2019-10-07 DIAGNOSIS — R26.2 DIFFICULTY WALKING: ICD-10-CM

## 2019-10-07 DIAGNOSIS — Z48.89 AFTERCARE FOLLOWING SURGERY: Primary | ICD-10-CM

## 2019-10-07 DIAGNOSIS — M25.561 ACUTE PAIN OF RIGHT KNEE: ICD-10-CM

## 2019-10-07 PROCEDURE — 97116 GAIT TRAINING THERAPY: CPT | Performed by: PHYSICAL THERAPIST

## 2019-10-07 PROCEDURE — 97162 PT EVAL MOD COMPLEX 30 MIN: CPT | Performed by: PHYSICAL THERAPIST

## 2019-10-07 PROCEDURE — 97535 SELF CARE MNGMENT TRAINING: CPT | Performed by: PHYSICAL THERAPIST

## 2019-10-07 NOTE — PROGRESS NOTES
PT Evaluation   Today's date: 10/7/2019  Patient name: Madison Singh  : 1995  MRN: [id number]  Referring provider: Aristides Franz MD  Dx:   Encounter Diagnosis     ICD-10-CM    1  Aftercare following surgery Z48 89 Ambulatory referral to Physical Therapy   2  Difficulty walking R26 2    3  Acute pain of right knee M25 561      Assessment  Assessment details: Recovering from right knee surgery  Patient also has back pain which is aggravated by her recent right knee surgery  Impairments: abnormal gait, abnormal or restricted ROM, abnormal movement, activity intolerance, impaired balance, pain with function, safety issue and weight-bearing intolerance  Understanding of Dx/Px/POC: excellent   Prognosis: good    Goals  STG 2-4 weeks:    Increase B LE strength 2-5 lbs  Decrease pain by 1-2 levels on 1-10 pain scale  Increase standing/walking tolerance to >30 minutes  Patient independent with HEP  LTG 6-8 weeks:   Increase B LE strength 10-20 lbs  Decrease pain to 0-2/10 with activity  Increase single leg stance >30 seconds  Increase standing/walking tolerance to >90 minutes  Increase range of motion to normal   Improve gait pattern, coordination and balance to normal   D/C with HEP        Plan  Patient would benefit from: PT eval and skilled physical therapy  Planned modality interventions: ultrasound and unattended electrical stimulation  Planned therapy interventions: joint mobilization, manual therapy, neuromuscular re-education, patient education, postural training, self care, strengthening, stretching, therapeutic activities, therapeutic exercise, therapeutic training, transfer training, home exercise program, graded exercise, gait training, flexibility, coordination, balance and balance/weight bearing training  Frequency: 3x week  Duration in weeks: 6  Treatment plan discussed with: patient      Subjective Evaluation    Pain  Quality: discomfort, knife-like, pulling, squeezing, sharp, radiating and throbbing  Relieving factors: change in position, medications, relaxation, rest, support and ice  Aggravating factors: lifting, running, stair climbing, walking, standing and sitting  Progression: improved    Treatments  Current treatment: physical therapy  Patient Goals  Patient goals for therapy: decreased pain, improved balance, increased motion, return to work, return to [employer] activities, independence with ADLs/IADLs and increased strength        Objective    Date of onset:  9/16/2019    Date of Surgery:  9/16/2019    History of Present Episode: 10/7/2019  Junaid Perkins states she has had issues with her right knee for several years  She finally went for surgery since she fell recently on her right knee and was in severe pain  Past Medical History:    10/7/2019  Junaid Perkins reports right knee issues since age 15  Asthma  Thyroid issues  Previous Level of Functional Ability:  10/7/2019  Junaid Perkins states her right knee was getting worse and worse to walk at times  Inspection / Palpation:  Knee:  10/7/2019  Mesomorphic / Endomorphic body type  Moderate to severe signs of infection  No signs of wounds  No signs of drainage  Mild to moderate signs of ecchymotic regions  Mild to moderate signs of erythremic regions  Moderate signs of muscle spasm  Moderate signs of muscle guarding  Moderate signs of tenderness reported to palpation  Moderate signs of swelling  Positive signs of a surgery site  Current conditions appears consistent with recent acute surgery recovery episode  Chief Complaints:  10/7/2019  Junaid Bone reports moderate to severe difficulty with standing  Junaid Bone reports moderate to severe difficulty with walking  Junaid Bone reports moderate to severe difficulty with movement / use of her right knee  Junaid Bone reports severe difficulty with use of stairs  Junaid Bone reports severe difficulty with running  Junaid Bone reports severe difficulty with jumping    Junaid Bone reports moderate to severe difficulty with use of inclines  Lanny Eddy reports moderate difficulty with sleeping  Lanny Eddy reports moderate difficulty with her strength and endurance  Lanny Eddy reports moderate to severe limitations with her range of motion  Lanny Eddy reports moderate to severe difficulty lying on her right knee region      KNEE PAIN Resting Moving Palpation Standing Walking   10/7/2019 Lt 0 0 0 0 0   10/7/2019 Rt 0-3 2-6 4-7 3-7 3-7     KNEE PAIN Running Stairs Sleeping Twisting Jumping   10/7/2019 Lt 0 0 0 0 0   10/7/2019 Rt NA NA 3-6 4-7 NA     KNEE AROM Flexion Extension SLR   10/7/2019 Lt 140° 0° 95°   10/7/2019 Rt 65° 0° 74°     KNEE MMT Flexion Extension Varus Stress Valgus Stress   10/7/2019 Lt 0/10  35 lbs 0/10  33 lbs 0/10  29 lbs 0/10  30 lbs   10/7/2019 Rt 5/10  7 lbs 5/10  6 lbs 5/10  5 lbs 6/10  6 lbs     Precautions: Right Knee Surgery    Daily Treatment Log  Manual  10/7       MT, ROM        HEP 15'       Exercise Log 10/7       Balance Board        Chair Squats        P-Bar-GT-Forward, Backward,Side-Even & Dips        BOSU-Walk        Foam Pad SLR,Hip/KneeFl,Step Ups        Foam Beam        Fitter-Slalom        Monster Steps        BAPS- L-2        'x4       T/G-Squats PF        W/P-Hip-Abd,Add,Flex,Ext        WP-Squats        Trimax-TKE        [id number]        NK Table Exer        NK Table ROM        TM        Stepper        Bike        ME, PE                Modalities 10/7       &ES        US

## 2019-10-07 NOTE — LETTER
2019  Signature Required / Plan of Care / PT Evaluation  Allegra Coronado MD  [address] [facility]    Patient: Tayla Funez   YOB: 1995   Date of Visit: 10/7/2019     Encounter Diagnosis     ICD-10-CM    1  Aftercare following surgery Z48 89 Ambulatory referral to Physical Therapy   2  Difficulty walking R26 2    3  Acute pain of right knee M25 561      Dear Dr Jose Triplett:   Thank you for your recent referral of Tayla Funez  Please review the attached evaluation summary from [place] [address] recent visit  Please verify that you agree with the plan of care by signing the attached order  If you have any questions or concerns, please do not hesitate to call  I sincerely appreciate the opportunity to share in the care of one of your patients and hope to have another opportunity to work with you in the near future  Sincerely,    Flora Daniel, PT    Referring Provider:   I certify that I have read the below Plan of Care and certify the need for these services furnished under this plan of treatment while under my care  Allegra Coronado MD  [address] [place] [address] In Basket    Please SIGN ABOVE and return THIS PAGE ONLY to Fax # [phone]        PT Evaluation   Today's date: 10/7/2019  Patient name: Tayla Funez  : 1995  MRN: [id number]  Referring provider: Kaushik Garcia MD  Dx:   Encounter Diagnosis     ICD-10-CM    1  Aftercare following surgery Z48 89 Ambulatory referral to Physical Therapy   2  Difficulty walking R26 2    3  Acute pain of right knee M25 561      Assessment  Assessment details: Recovering from right knee surgery  Patient also has back pain which is aggravated by her recent right knee surgery    Impairments: abnormal gait, abnormal or restricted ROM, abnormal movement, activity intolerance, impaired balance, pain with function, safety issue and weight-bearing intolerance  Understanding of Dx/Px/POC: excellent   Prognosis: good    Goals  STG 2-4 weeks:    Increase B LE strength 2-5 lbs  Decrease pain by 1-2 levels on 1-10 pain scale  Increase standing/walking tolerance to >30 minutes  Patient independent with HEP  LTG 6-8 weeks:   Increase B LE strength 10-20 lbs  Decrease pain to 0-2/10 with activity  Increase single leg stance >30 seconds  Increase standing/walking tolerance to >90 minutes  Increase range of motion to normal   Improve gait pattern, coordination and balance to normal   D/C with HEP  Plan  Patient would benefit from: PT eval and skilled physical therapy  Planned modality interventions: ultrasound and unattended electrical stimulation  Planned therapy interventions: joint mobilization, manual therapy, neuromuscular re-education, patient education, postural training, self care, strengthening, stretching, therapeutic activities, therapeutic exercise, therapeutic training, transfer training, home exercise program, graded exercise, gait training, flexibility, coordination, balance and balance/weight bearing training  Frequency: 3x week  Duration in weeks: 6  Treatment plan discussed with: patient      Subjective Evaluation    Pain  Quality: discomfort, knife-like, pulling, squeezing, sharp, radiating and throbbing  Relieving factors: change in position, medications, relaxation, rest, support and ice  Aggravating factors: lifting, running, stair climbing, walking, standing and sitting  Progression: improved    Treatments  Current treatment: physical therapy  Patient Goals  Patient goals for therapy: decreased pain, improved balance, increased motion, return to work, return to [employer] activities, independence with ADLs/IADLs and increased strength        Objective    Date of onset:  9/16/2019    Date of Surgery:  9/16/2019    History of Present Episode: 10/7/2019  [facility] states she has had issues with her right knee for several years    She finally went for surgery since she fell recently on her right knee and was in severe pain  Past Medical History:    10/7/2019  Lexie Krause reports right knee issues since age 15  Asthma  Thyroid issues  Previous Level of Functional Ability:  10/7/2019  Lexie Krause states her right knee was getting worse and worse to walk at times  Inspection / Palpation:  Knee:  10/7/2019  Mesomorphic / Endomorphic body type  Moderate to severe signs of infection  No signs of wounds  No signs of drainage  Mild to moderate signs of ecchymotic regions  Mild to moderate signs of erythremic regions  Moderate signs of muscle spasm  Moderate signs of muscle guarding  Moderate signs of tenderness reported to palpation  Moderate signs of swelling  Positive signs of a surgery site  Current conditions appears consistent with recent acute surgery recovery episode  Chief Complaints:  10/7/2019  Lexie Krause reports moderate to severe difficulty with standing  Lexie Krause reports moderate to severe difficulty with walking  Lexie Krause reports moderate to severe difficulty with movement / use of her right knee  Lexie Krause reports severe difficulty with use of stairs  Lexie Krause reports severe difficulty with running  Lexie Krause reports severe difficulty with jumping  Lexie Krause reports moderate to severe difficulty with use of inclines  Lexie Krause reports moderate difficulty with sleeping  Lexie Krause reports moderate difficulty with her strength and endurance  Lexie Krause reports moderate to severe limitations with her range of motion  Lexie Krause reports moderate to severe difficulty lying on her right knee region      KNEE PAIN Resting Moving Palpation Standing Walking   10/7/2019 Lt 0 0 0 0 0   10/7/2019 Rt 0-3 2-6 4-7 3-7 3-7     KNEE PAIN Running Stairs Sleeping Twisting Jumping   10/7/2019 Lt 0 0 0 0 0   10/7/2019 Rt NA NA 3-6 4-7 NA     KNEE AROM Flexion Extension SLR   10/7/2019 Lt 140° 0° 95°   10/7/2019 Rt 65° 0° 74°     KNEE MMT Flexion Extension Varus Stress Valgus Stress   10/7/2019 Lt 0/10 35 lbs 0/10  33 lbs 0/10  29 lbs 0/10  30 lbs   10/7/2019 Rt 5/10  7 lbs 5/10  6 lbs 5/10  5 lbs 6/10  6 lbs     Precautions: Right Knee Surgery    Daily Treatment Log  Manual  10/7       MT, ROM        HEP 15'       Exercise Log 10/7       Balance Board        Chair Squats        P-Bar-GT-Forward, Backward,Side-Even & Dips        BOSU-Walk        Foam Pad SLR,Hip/KneeFl,Step Ups        Foam Beam        Fitter-Slalom        Monster Steps        BAPS- L-2        'x4       T/G-Squats PF        W/P-Hip-Abd,Add,Flex,Ext        WP-Squats        Trimax-TKE        [id number]        NK Table Exer        NK Table ROM        TM        Stepper        Bike        ME, PE                Modalities 10/7       MH&ES        US

## 2019-10-09 ENCOUNTER — OFFICE VISIT (OUTPATIENT)
Dept: PHYSICAL THERAPY | Facility: CLINIC | Age: 24
End: 2019-10-09
Payer: COMMERCIAL

## 2019-10-09 DIAGNOSIS — R26.2 DIFFICULTY WALKING: ICD-10-CM

## 2019-10-09 DIAGNOSIS — M25.561 ACUTE PAIN OF RIGHT KNEE: ICD-10-CM

## 2019-10-09 DIAGNOSIS — Z48.89 AFTERCARE FOLLOWING SURGERY: Primary | ICD-10-CM

## 2019-10-09 PROCEDURE — 97112 NEUROMUSCULAR REEDUCATION: CPT | Performed by: PHYSICAL THERAPIST

## 2019-10-09 PROCEDURE — 97140 MANUAL THERAPY 1/> REGIONS: CPT | Performed by: PHYSICAL THERAPIST

## 2019-10-09 NOTE — PROGRESS NOTES
Today's date: 10/9/2019  Patient name: Hugo Aviles  : 1995  MRN: [id number]  Referring provider: Landon Guo MD  Dx:   Encounter Diagnosis     ICD-10-CM    1  Aftercare following surgery Z48 89    2  Difficulty walking R26 2    3  Acute pain of right knee M25 561      Subjective:  Sydnee Pabon states her left knee is feeling a little better but is still very painful  She still cannot move her knee well at all  Objective: See treatment log below    Assessment: Tolerated treatment well  Patient exhibited good technique with therapeutic exercises and would benefit from continued PT    Plan: Continue per plan of care  Progress treatment as tolerated         Precautions: Right Knee Surgery    Daily Treatment Log  Manual  10/7 10/9      MT, ROM  15'      HEP 15'       Exercise Log 10/7 10/9      Balance Board  30x      Chair Squats        P-Bar-GT-Forward, Backward,Side-Even & Dips  12x      BOSU-Walk        Foam Pad SLR,Hip/KneeFl,Step Ups        Foam Beam        Fitter-Slalom        Monster Steps        BAPS- L-2        'x4 120'x2      T/G-Squats PF        W/P-Hip-Abd,Add,Flex,Ext        WP-Squats        Trimax-TKE        [id number]        NK Table Exer        NK Table ROM        TM        Stepper        Bike  10'-ROM      ME, PE  15'              Modalities 10/7 10/9      MH&ES        US

## 2019-10-11 ENCOUNTER — OFFICE VISIT (OUTPATIENT)
Dept: PHYSICAL THERAPY | Facility: CLINIC | Age: 24
End: 2019-10-11
Payer: COMMERCIAL

## 2019-10-11 DIAGNOSIS — R26.2 DIFFICULTY WALKING: ICD-10-CM

## 2019-10-11 DIAGNOSIS — M25.561 ACUTE PAIN OF RIGHT KNEE: ICD-10-CM

## 2019-10-11 DIAGNOSIS — Z48.89 AFTERCARE FOLLOWING SURGERY: Primary | ICD-10-CM

## 2019-10-11 PROCEDURE — 97140 MANUAL THERAPY 1/> REGIONS: CPT | Performed by: PHYSICAL THERAPIST

## 2019-10-11 PROCEDURE — 97112 NEUROMUSCULAR REEDUCATION: CPT | Performed by: PHYSICAL THERAPIST

## 2019-10-11 NOTE — PROGRESS NOTES
Today's date: 10/11/2019  Patient name: Lisa Talamantes  : 1995  MRN: [id number]  Referring provider: Lolita Carr MD  Dx:   Encounter Diagnosis     ICD-10-CM    1  Aftercare following surgery Z48 89    2  Difficulty walking R26 2    3  Acute pain of right knee M25 561      Subjective:  José Luis Aguilera states her left knee is still very sore but she is feeling a little better  Objective: See treatment log below    Assessment: Tolerated treatment well  Patient exhibited good technique with therapeutic exercises and would benefit from continued PT    Plan: Continue per plan of care  Progress treatment as tolerated         Precautions: Right Knee Surgery    Daily Treatment Log  Manual  10/7 10/9 10/11     MT, ROM  15' 15'     HEP 15'  Brace 50     Exercise Log 10/7 10/9 10/11     Balance Board  30x 30x     Chair Squats        P-Bar-GT-Forward, Backward,Side-Even & Dips  12x 12x     BOSU-Walk   2'     Foam Pad SLR,Hip/KneeFl,Step Ups        Foam Beam        Fitter-Slalom        Monster Steps        BAPS- L-2        'x4 120'x2 120'x2     T/G-Squats PF        W/P-Hip-Abd,Add,Flex,Ext        WP-Squats        Trimax-TKE        [id number]        NK Table Exer        NK Table ROM        TM        Stepper        Bike  10'-ROM 10'-ROM     ME, PE  15' 15'             Modalities 10/7 10/9 10/11     MH&ES        US

## 2019-10-14 ENCOUNTER — OFFICE VISIT (OUTPATIENT)
Dept: PHYSICAL THERAPY | Facility: CLINIC | Age: 24
End: 2019-10-14
Payer: COMMERCIAL

## 2019-10-14 DIAGNOSIS — M25.561 ACUTE PAIN OF RIGHT KNEE: ICD-10-CM

## 2019-10-14 DIAGNOSIS — R26.2 DIFFICULTY WALKING: ICD-10-CM

## 2019-10-14 DIAGNOSIS — Z48.89 AFTERCARE FOLLOWING SURGERY: Primary | ICD-10-CM

## 2019-10-14 PROCEDURE — 97140 MANUAL THERAPY 1/> REGIONS: CPT | Performed by: PHYSICAL THERAPIST

## 2019-10-14 PROCEDURE — 97112 NEUROMUSCULAR REEDUCATION: CPT | Performed by: PHYSICAL THERAPIST

## 2019-10-14 NOTE — PROGRESS NOTES
Today's date: 10/14/2019  Patient name: Servando Celaya  : 1995  MRN: [id number]  Referring provider: Parth Young MD  Dx:   Encounter Diagnosis     ICD-10-CM    1  Aftercare following surgery Z48 89    2  Difficulty walking R26 2    3  Acute pain of right knee M25 561      Subjective:  Iam Campos states her right knee is still sore but getting better  Objective: See treatment log below    Assessment: Tolerated treatment well  Patient exhibited good technique with therapeutic exercises and would benefit from continued PT    Plan: Continue per plan of care  Progress treatment as tolerated         Precautions: Right Knee Surgery    Daily Treatment Log  Manual  10/7 10/9 10/11 10/14    MT, ROM  15' 15' 15'    HEP 15'  Brace 50     Exercise Log 10/7 10/9 10/11 10/14    Balance Board  30x 30x 30x    Chair Squats        P-Bar-GT-Forward, Backward,Side-Even & Dips  12x 12x 12x    BOSU-Walk   2' 5'    Foam Pad SLR,Hip/KneeFl,Step Ups        Foam Beam        Fitter-Slam        Monster Steps        BAPS- L-2        'x4 120'x2 120'x2 120'x2    T/G-Squats PF        W/P-Hip-Abd,Add,Flex,Ext        WP-Squats        Trimax-TKE        [id number]        NK Table Exer    0#-30x    NK Table ROM    0#-10'    TM        Stepper        Bike  10'-ROM Odtommyia Crystal    ME, PE  15' 15' 15'            Modalities 10/7 10/9 10/11 10/14    MH&ES        US

## 2019-10-16 ENCOUNTER — OFFICE VISIT (OUTPATIENT)
Dept: PHYSICAL THERAPY | Facility: CLINIC | Age: 24
End: 2019-10-16
Payer: COMMERCIAL

## 2019-10-16 DIAGNOSIS — M25.561 ACUTE PAIN OF RIGHT KNEE: ICD-10-CM

## 2019-10-16 DIAGNOSIS — Z48.89 AFTERCARE FOLLOWING SURGERY: Primary | ICD-10-CM

## 2019-10-16 DIAGNOSIS — R26.2 DIFFICULTY WALKING: ICD-10-CM

## 2019-10-16 PROCEDURE — 97110 THERAPEUTIC EXERCISES: CPT | Performed by: PHYSICAL THERAPIST

## 2019-10-16 PROCEDURE — 97140 MANUAL THERAPY 1/> REGIONS: CPT | Performed by: PHYSICAL THERAPIST

## 2019-10-16 PROCEDURE — 97112 NEUROMUSCULAR REEDUCATION: CPT | Performed by: PHYSICAL THERAPIST

## 2019-10-16 NOTE — PROGRESS NOTES
Today's date: 10/16/2019  Patient name: Svetlana Alvarez  : 1995  MRN: [id number]  Referring provider: Alexa Mcgovern MD  Dx:   Encounter Diagnosis     ICD-10-CM    1  Aftercare following surgery Z48 89    2  Difficulty walking R26 2    3  Acute pain of right knee M25 561      Subjective:  Sierra Pino states her right knee is still very tender but she is slowly moving better and with less pain  Objective: See treatment log below    Assessment: Tolerated treatment well  Patient exhibited good technique with therapeutic exercises and would benefit from continued PT    Plan: Continue per plan of care  Progress treatment as tolerated         Precautions: Right Knee Surgery    Daily Treatment Log  Manual  10/7 10/9 10/11 10/14 10/16   MT, ROM  15' 15' 15' 15'   HEP 15'  Brace 50     Exercise Log 10/7 10/9 10/11 10/14 10/16   Balance Board  30x 30x 30x 30x   Chair Squats        P-Bar-GT-Forward, Backward,Side-Even & Dips  12x 12x 12x 12x   BOSU-Walk   2' 5' 5'   Foam Pad SLR,Hip/KneeFl,Step Ups        Foam Beam        Fitter-Slalom        Monster Steps        BAPS- L-2        'x4 120'x2 120'x2 120'x2 120'x2   T/G-Squats PF        W/P-Hip-Abd,Add,Flex,Ext        WP-Squats        Trimax-TKE        [id number]        NK Table Exer    0#-30x 0#-30x   NK Table ROM    0#-10' 0#-10'   TM        Stepper        Bike  10'-ROM Merdis Horns   ME, PE  15' 15' 15' 15'           Modalities 10/7 10/9 10/11 10/14 10/16   MH&ES        US

## 2019-10-18 ENCOUNTER — APPOINTMENT (OUTPATIENT)
Dept: PHYSICAL THERAPY | Facility: CLINIC | Age: 24
End: 2019-10-18
Payer: COMMERCIAL

## 2019-10-21 ENCOUNTER — OFFICE VISIT (OUTPATIENT)
Dept: PHYSICAL THERAPY | Facility: CLINIC | Age: 24
End: 2019-10-21
Payer: COMMERCIAL

## 2019-10-21 DIAGNOSIS — M25.561 ACUTE PAIN OF RIGHT KNEE: ICD-10-CM

## 2019-10-21 DIAGNOSIS — R26.2 DIFFICULTY WALKING: ICD-10-CM

## 2019-10-21 DIAGNOSIS — Z48.89 AFTERCARE FOLLOWING SURGERY: Primary | ICD-10-CM

## 2019-10-21 PROCEDURE — 97140 MANUAL THERAPY 1/> REGIONS: CPT | Performed by: PHYSICAL THERAPIST

## 2019-10-21 PROCEDURE — 97112 NEUROMUSCULAR REEDUCATION: CPT | Performed by: PHYSICAL THERAPIST

## 2019-10-21 PROCEDURE — 97110 THERAPEUTIC EXERCISES: CPT | Performed by: PHYSICAL THERAPIST

## 2019-10-21 NOTE — PROGRESS NOTES
Today's date: 10/21/2019  Patient name: Ely Martin  : 1995  MRN: [id number]  Referring provider: Eddie Hobson MD  Dx:   Encounter Diagnosis     ICD-10-CM    1  Aftercare following surgery Z48 89    2  Difficulty walking R26 2    3  Acute pain of right knee M25 561      Subjective:  Mauri Stone states her right knee is feeling better  Objective: See treatment log below      Assessment: Tolerated treatment well  Patient exhibited good technique with therapeutic exercises and would benefit from continued PT    Plan: Continue per plan of care  Progress treatment as tolerated         Precautions: Right Knee Surgery    Daily Treatment Log  Manual  10/21    10/16   MT, ROM 15'    15'   HEP        Exercise Log 10/21    10/16   Balance Board 30x    30x   Chair Squats        P-Bar-GT-Forward, Backward,Side-Even & Dips 12x    12x   BOSU-Walk 5'    5'   Foam Pad SLR,Hip/KneeFl,Step Ups        Foam Beam        Fitter-Slalom        Monster Steps        BAPS- L-2        GT 120x2'    120'x2   T/G-Squats PF 30x       W/P-Hip-Abd,Add,Flex,Ext        WP-Squats        Trimax-TKE        [id number]        NK Table Exer 0#-30x    0#-30x   NK Table ROM 0#-10'    0#-10'   TM        Stepper        Bike     10'-ROM   ME, PE 15'    15'           Modalities 10/21    10/16   &ES        US

## 2019-10-23 ENCOUNTER — APPOINTMENT (OUTPATIENT)
Dept: PHYSICAL THERAPY | Facility: CLINIC | Age: 24
End: 2019-10-23
Payer: COMMERCIAL

## 2019-10-24 ENCOUNTER — OFFICE VISIT (OUTPATIENT)
Dept: PHYSICAL THERAPY | Facility: CLINIC | Age: 24
End: 2019-10-24
Payer: COMMERCIAL

## 2019-10-24 DIAGNOSIS — Z48.89 AFTERCARE FOLLOWING SURGERY: Primary | ICD-10-CM

## 2019-10-24 DIAGNOSIS — R26.2 DIFFICULTY WALKING: ICD-10-CM

## 2019-10-24 DIAGNOSIS — M25.561 ACUTE PAIN OF RIGHT KNEE: ICD-10-CM

## 2019-10-24 PROCEDURE — 97112 NEUROMUSCULAR REEDUCATION: CPT | Performed by: PHYSICAL THERAPIST

## 2019-10-24 PROCEDURE — 97140 MANUAL THERAPY 1/> REGIONS: CPT | Performed by: PHYSICAL THERAPIST

## 2019-10-24 PROCEDURE — 97110 THERAPEUTIC EXERCISES: CPT | Performed by: PHYSICAL THERAPIST

## 2019-10-24 NOTE — PROGRESS NOTES
Today's date: 10/24/2019  Patient name: Lisa Talamantes  : 1995  MRN: [id number]  Referring provider: Lolita Carr MD  Dx:   Encounter Diagnosis     ICD-10-CM    1  Aftercare following surgery Z48 89    2  Difficulty walking R26 2    3  Acute pain of right knee M25 561      Subjective:  José Luis Aguilera states her knee is slowly feeling better  She can stand longer and walk further  Objective: See treatment log below    Assessment: Tolerated treatment well  Patient exhibited good technique with therapeutic exercises and would benefit from continued PT    Plan: Continue per plan of care  Progress treatment as tolerated         Precautions: Right Knee Surgery    Daily Treatment Log  Manual  10/21 10/24   10/16   MT, ROM 15' 15'   15'   HEP        Exercise Log 10/21 10/24   10/16   Balance Board 30x 30x   30x   Chair Squats        P-Bar-GT-Forward, Backward,Side-Even & Dips 12x 12x   12x   BOSU-Walk 5' 5'   5'   Foam Pad SLR,Hip/KneeFl,Step Ups        Foam Beam        Fitter-[facility] [place]        Monster Steps        BAPS- L-2        GT 120x2' 120x2'   120'x2   T/G-Squats PF 30x 30x      W/P-Hip-Abd,Add,Flex,Ext  5#-30x      WP-Squats  5#-30x      Trimax-TKE        [id number]        NK Table Exer 0#-30x 2 5#-30x   0#-30x   NK Table ROM 0#-10' 5#-10'   0#-10'   TM        Stepper        Bike     10'-ROM   ME, PE 15' 15'   15'           Modalities 10/21 10/24   10/16   MH&ES        US

## 2019-10-25 ENCOUNTER — APPOINTMENT (OUTPATIENT)
Dept: PHYSICAL THERAPY | Facility: CLINIC | Age: 24
End: 2019-10-25
Payer: COMMERCIAL

## 2019-10-29 ENCOUNTER — OFFICE VISIT (OUTPATIENT)
Dept: OBGYN CLINIC | Facility: CLINIC | Age: 24
End: 2019-10-29

## 2019-10-29 VITALS
WEIGHT: 193 LBS | DIASTOLIC BLOOD PRESSURE: 85 MMHG | BODY MASS INDEX: 32.95 KG/M2 | HEART RATE: 109 BPM | HEIGHT: 64 IN | SYSTOLIC BLOOD PRESSURE: 127 MMHG

## 2019-10-29 DIAGNOSIS — S83.004D CLOSED DISLOCATION OF RIGHT PATELLA, SUBSEQUENT ENCOUNTER: ICD-10-CM

## 2019-10-29 DIAGNOSIS — Z48.89 AFTERCARE FOLLOWING SURGERY: Primary | ICD-10-CM

## 2019-10-29 PROCEDURE — 99024 POSTOP FOLLOW-UP VISIT: CPT | Performed by: ORTHOPAEDIC SURGERY

## 2019-10-29 NOTE — PROGRESS NOTES
Chief Complaint   right knee MPFL reconstruction    History Of Presenting Illness  Tayla Funez 1995 presents with  Right knee MPFL reconstruction for dislocating patellar done on September 16th, 2019  Patient doing reasonably well  Patient is following postop protocol with bracing and physical therapy  Patient's pain has decreased significantly      Current Medications  Current Outpatient Medications   Medication Sig Dispense Refill    albuterol (PROVENTIL HFA,VENTOLIN HFA) 90 mcg/act inhaler Inhale 2 puffs every 6 (six) hours as needed for wheezing      aspirin (ECOTRIN) 325 mg EC tablet Take 1 tablet (325 mg total) by mouth every 12 (twelve) hours 60 tablet 0    cetirizine (ZyrTEC) 10 mg tablet Take 10 mg by mouth daily      citalopram (CeleXA) 40 mg tablet Take 40 mg by mouth daily      fluticasone (FLOVENT HFA) 110 MCG/ACT inhaler Inhale 2 puffs 2 (two) times a day Rinse mouth after use        ibuprofen (MOTRIN) 600 mg tablet Take 1 tablet (600 mg total) by mouth every 6 (six) hours as needed for mild pain 30 tablet 0    levothyroxine 100 mcg tablet Take 100 mcg by mouth daily      norgestimate-ethinyl estradiol (ORTHO-CYCLEN) 0 25-35 MG-MCG per tablet Take 1 tablet by mouth daily      ondansetron (ZOFRAN) 4 mg tablet Take 1 tablet (4 mg total) by mouth every 8 (eight) hours as needed for nausea or vomiting 20 tablet 0    QUEtiapine (SEROquel) 100 mg tablet Take 100 mg by mouth daily at bedtime      oxyCODONE-acetaminophen (PERCOCET) 5-325 mg per tablet Take 1 tablet by mouth every 6 (six) hours as needed for moderate pain for up to 20 dosesMax Daily Amount: 4 tablets (Patient not taking: Reported on 10/29/2019) 20 tablet 0    oxyCODONE-acetaminophen (PERCOCET) 5-325 mg per tablet Take 1 tablet by mouth every 8 (eight) hours as needed for moderate pain for up to 21 dosesMax Daily Amount: 3 tablets (Patient not taking: Reported on 10/29/2019) 21 tablet 0    predniSONE 1 mg tablet Take 1 mg by mouth daily       No current facility-administered medications for this visit          Current Problems    Active Problems:   Patient Active Problem List    Diagnosis Date Noted    Closed dislocation of right patella 09/04/2019         Review of Systems:    General: negative for - chills, fatigue, fever,  weight gain or weight loss  Psychological: negative for - anxiety, behavioral disorder, concentration difficulties  Ophthalmic: negative for - blurry vision, decreased vision, double vision,      Past Medical History:   Past Medical History:   Diagnosis Date    Asthma     Bipolar 1 disorder ([address] )     Depression     Disease of thyroid gland     Nerve disorder     Pet allergy     Seasonal allergies        Past Surgical History:   Past Surgical History:   Procedure Laterality Date    BACK SURGERY      [place] KNEE SCOPE,FULL SYNOVECT Right 9/16/2019    Procedure: ARTHROSCOPY KNEE  arthroscopy of right knee 1st, mini open right MPFL reconstruction with allograft;  Surgeon: Shannan Blevins MD;  Location: [facility];  Service: Orthopedics    WISDOM TOOTH EXTRACTION         Family History:  Family history reviewed and non-contributory  Family History   Problem Relation Age of Onset    Pneumonia Mother     Multiple sclerosis Father        Social History:  Social History     Socioeconomic History    Marital status: Single     Spouse name: None    Number of children: None    Years of education: None    Highest education level: None   Occupational History    None   Social Needs    Financial resource strain: None    Food insecurity:     Worry: None     Inability: None    Transportation needs:     Medical: None     Non-medical: None   Tobacco Use    Smoking status: Current Every Day Smoker     Types: E-Cigarettes    Smokeless tobacco: Current User    Tobacco comment: vapes   Substance and Sexual Activity    Alcohol use: Not Currently    Drug use: Yes     Types: Marijuana    Sexual activity: None Lifestyle    Physical activity:     Days per week: None     Minutes per session: None    Stress: None   Relationships    Social connections:     Talks on phone: None     Gets together: None     Attends [employer] service: None     Active member of club or organization: None     Attends meetings of clubs or organizations: None     Relationship status: None    Intimate partner violence:     Fear of current or ex partner: None     Emotionally abused: None     Physically abused: None     Forced sexual activity: None   Other Topics Concern    None   Social History Narrative    None       Allergies:   No Known Allergies        Physical ExaminationBP 127/85 (BP Location: Left arm, Patient Position: Sitting, Cuff Size: Large)   Pulse (!) 109   Ht 5' 4" (1 626 m)   Wt 87 5 kg (193 lb)   BMI 33 13 kg/m²   Gen: Alert and oriented to person, place, time  HEENT: EOMI, eyes clear, moist mucus membranes, hearing intact      Orthopedic Exam   right knee incisions well healed   black tattooing of the medial-most incisional site possibly due to metal shavings from the drilling   full extension of the knee with good quad control   flexion to 100 patellar tracking well no signs of instability          Impression   stable status post right MPFL reconstruction      Plan     continue physical therapy   patella stabilizing brace   follow-up in 6 weeks x-ray right knee AP lateral skyline on arrival   if the tattooing on the medial most incision bothers her this will require surgical excision and she is aware of this    Patty Edgar MD        Portions of the record may have been created with voice recognition software  Occasional wrong word or "sound a like" substitutions may have occurred due to the inherent limitations of voice recognition software  Read the chart carefully and recognize, using context, where substitutions have occurred

## 2019-10-30 ENCOUNTER — OFFICE VISIT (OUTPATIENT)
Dept: PHYSICAL THERAPY | Facility: CLINIC | Age: 24
End: 2019-10-30
Payer: COMMERCIAL

## 2019-10-30 DIAGNOSIS — M25.561 ACUTE PAIN OF RIGHT KNEE: ICD-10-CM

## 2019-10-30 DIAGNOSIS — R26.2 DIFFICULTY WALKING: ICD-10-CM

## 2019-10-30 DIAGNOSIS — Z48.89 AFTERCARE FOLLOWING SURGERY: Primary | ICD-10-CM

## 2019-10-30 PROCEDURE — 97110 THERAPEUTIC EXERCISES: CPT | Performed by: PHYSICAL THERAPIST

## 2019-10-30 PROCEDURE — 97112 NEUROMUSCULAR REEDUCATION: CPT | Performed by: PHYSICAL THERAPIST

## 2019-10-30 PROCEDURE — 97140 MANUAL THERAPY 1/> REGIONS: CPT | Performed by: PHYSICAL THERAPIST

## 2019-10-30 NOTE — PROGRESS NOTES
Today's date: 10/30/2019  Patient name: Natalie Alfredo  : 1995  MRN: [id number]  Referring provider: Anthony Castillo MD  Dx:   Encounter Diagnosis     ICD-10-CM    1  Aftercare following surgery Z48 89    2  Difficulty walking R26 2    3  Acute pain of right knee M25 561      Subjective:  Lexie Krause states her knee is sore toady form her new brace  The brace is real  tight and digs into her knee  Adjusted her brace for more comfort  Objective: See treatment log below  Assessment: Tolerated treatment well  Patient exhibited good technique with therapeutic exercises and would benefit from continued PT    Plan: Continue per plan of care  Progress treatment as tolerated         Precautions: Right Knee Surgery    Daily Treatment Log  Manual  10/21 10/24 10/30  10/16   MT, ROM 15' 15'   15'   HEP        Exercise Log 10/21 10/24 10/30  10/16   Balance Board 30x 30x 30x  30x   Chair Squats        P-Bar-GT-Forward, Backward,Side-Even & Dips 12x 12x 12x  12x   BOSU-Walk 5' 5' 5'  5'   Foam Pad SLR,Hip/KneeFl,Step Ups        Foam Beam        Fitter-Slalom        Monster Steps        BAPS- L-2        GT 120x2' 120x2'   120'x2   T/G-Squats PF 30x 30x 30x     W/P-Hip-Abd,Add,Flex,Ext  5#-30x 5#-30x     WP-Squats  5#-30x 5#-30x     Trimax-TKE        [id number]        NK Table Exer 0#-30x 2 5#-30x 2 5#-30x  0#-30x   NK Table ROM 0#-10' 5#-10' 5#-10'  0#-10'   TM        Stepper        Bike     10'-ROM   ME, PE 15' 15' 15'  15'           Modalities 10/21 10/24 10/30  10/16   MH&ES        US

## 2019-10-31 ENCOUNTER — TELEPHONE (OUTPATIENT)
Dept: OBGYN CLINIC | Facility: CLINIC | Age: 24
End: 2019-10-31

## 2019-10-31 ENCOUNTER — TELEPHONE (OUTPATIENT)
Dept: OBGYN CLINIC | Facility: MEDICAL CENTER | Age: 24
End: 2019-10-31

## 2019-10-31 NOTE — TELEPHONE ENCOUNTER
Pt cld that the rght knee from the sx is uncomfortable and she had discussed with you about removing the metal shavings  She would like to move ahead with removing them      CB # [phone]

## 2019-11-01 ENCOUNTER — APPOINTMENT (OUTPATIENT)
Dept: PHYSICAL THERAPY | Facility: CLINIC | Age: 24
End: 2019-11-01
Payer: COMMERCIAL

## 2019-11-04 ENCOUNTER — OFFICE VISIT (OUTPATIENT)
Dept: PHYSICAL THERAPY | Facility: CLINIC | Age: 24
End: 2019-11-04
Payer: COMMERCIAL

## 2019-11-04 DIAGNOSIS — Z48.89 AFTERCARE FOLLOWING SURGERY: Primary | ICD-10-CM

## 2019-11-04 DIAGNOSIS — R26.2 DIFFICULTY WALKING: ICD-10-CM

## 2019-11-04 DIAGNOSIS — M25.561 ACUTE PAIN OF RIGHT KNEE: ICD-10-CM

## 2019-11-04 PROCEDURE — 97140 MANUAL THERAPY 1/> REGIONS: CPT | Performed by: PHYSICAL THERAPIST

## 2019-11-04 PROCEDURE — G0283 ELEC STIM OTHER THAN WOUND: HCPCS | Performed by: PHYSICAL THERAPIST

## 2019-11-04 PROCEDURE — 97110 THERAPEUTIC EXERCISES: CPT | Performed by: PHYSICAL THERAPIST

## 2019-11-04 PROCEDURE — 97112 NEUROMUSCULAR REEDUCATION: CPT | Performed by: PHYSICAL THERAPIST

## 2019-11-04 PROCEDURE — 97014 ELECTRIC STIMULATION THERAPY: CPT | Performed by: PHYSICAL THERAPIST

## 2019-11-04 NOTE — PROGRESS NOTES
Today's date: 2019  Patient name: Apoorva Apple  : 1995  MRN: [id number]  Referring provider: Andrea aHndy MD  Dx:   Encounter Diagnosis     ICD-10-CM    1  Aftercare following surgery Z48 89    2  Difficulty walking R26 2    3  Acute pain of right knee M25 561      Subjective:  Annette Parson states her right knee is sore today but she is feeling a little better  Objective: See treatment log below    Assessment: Tolerated treatment well  Patient exhibited good technique with therapeutic exercises and would benefit from continued PT    Plan: Continue per plan of care  Progress treatment as tolerated         Precautions: Right Knee Surgery    Daily Treatment Log  Manual  10/21 10/24 10/30 11/4    MT, ROM 15' 15' 15' 15'    HEP        Exercise Log 10/21 10/24 10/30 11/4    Balance Board 30x 30x 30x 30x    Chair Squats        P-Bar-GT-Forward, Backward,Side-Even & Dips 12x 12x 12x 12x    BOSU-Walk 5' 5' 5' 5'    Foam Pad SLR,Hip/KneeFl,Step Ups        Foam Beam        Fitter-Slalom        Monster Steps        BAPS- L-2        GT 120x2' 120x2'  120'x2    T/G-Squats PF 30x 30x 30x 30x    W/P-Hip-Abd,Add,Flex,Ext  5#-30x 5#-30x 5#-30x    WP-Squats  5#-30x 5#-30x 5#-30x    Trimax-TKE        [id number]        NK Table Exer 0#-30x 2 5#-30x 2 5#-30x 2 5#-30x    NK Table ROM 0#-10' 5#-10' 5#-10' 7 5#-10'    TM        Stepper        Bike    10'    ME, PE 15' 15' 15' 15'            Modalities 10/21 10/24 10/30 11/4    MH&ES    20'    US coughing/breathing deeply

## 2019-11-06 ENCOUNTER — APPOINTMENT (OUTPATIENT)
Dept: RADIOLOGY | Facility: MEDICAL CENTER | Age: 24
End: 2019-11-06
Payer: COMMERCIAL

## 2019-11-06 ENCOUNTER — OFFICE VISIT (OUTPATIENT)
Dept: OBGYN CLINIC | Facility: CLINIC | Age: 24
End: 2019-11-06

## 2019-11-06 VITALS
HEIGHT: 64 IN | BODY MASS INDEX: 33.46 KG/M2 | SYSTOLIC BLOOD PRESSURE: 120 MMHG | WEIGHT: 196 LBS | DIASTOLIC BLOOD PRESSURE: 81 MMHG | HEART RATE: 109 BPM

## 2019-11-06 DIAGNOSIS — R52 PAINFUL SCAR: Primary | ICD-10-CM

## 2019-11-06 DIAGNOSIS — L90.5 PAINFUL SCAR: Primary | ICD-10-CM

## 2019-11-06 DIAGNOSIS — Z48.89 AFTERCARE FOLLOWING SURGERY: ICD-10-CM

## 2019-11-06 DIAGNOSIS — S83.004D CLOSED DISLOCATION OF RIGHT PATELLA, SUBSEQUENT ENCOUNTER: ICD-10-CM

## 2019-11-06 PROCEDURE — 99024 POSTOP FOLLOW-UP VISIT: CPT | Performed by: ORTHOPAEDIC SURGERY

## 2019-11-06 PROCEDURE — 73562 X-RAY EXAM OF KNEE 3: CPT

## 2019-11-06 RX ORDER — CHLORHEXIDINE GLUCONATE 4 G/100ML
SOLUTION TOPICAL DAILY PRN
Status: CANCELLED | OUTPATIENT
Start: 2019-11-18

## 2019-11-06 RX ORDER — CEFAZOLIN SODIUM 2 G/50ML
2000 SOLUTION INTRAVENOUS ONCE
Status: CANCELLED | OUTPATIENT
Start: 2019-11-18 | End: 2019-11-06

## 2019-11-06 NOTE — PATIENT INSTRUCTIONS
Pre-Surgery Instructions:   Medication Instructions    albuterol (PROVENTIL HFA,VENTOLIN HFA) 90 mcg/act inhaler Take morning of surgery    aspirin (ECOTRIN) 325 mg EC tablet Stop taking 3 days prior to surgery    cetirizine (ZyrTEC) 10 mg tablet per anesthesia guidelines     citalopram (CeleXA) 40 mg tablet per anesthesia guidelines     fluticasone (FLOVENT HFA) 110 MCG/ACT inhaler per anesthesia guidelines     ibuprofen (MOTRIN) 600 mg tablet Stop taking 3 days prior to surgery    levothyroxine 100 mcg tablet per anesthesia guidelines     norgestimate-ethinyl estradiol (ORTHO-CYCLEN) 0 25-35 MG-MCG per tablet per anesthesia guidelines     ondansetron (ZOFRAN) 4 mg tablet per anesthesia guidelines     oxyCODONE-acetaminophen (PERCOCET) 5-325 mg per tablet per anesthesia guidelines     oxyCODONE-acetaminophen (PERCOCET) 5-325 mg per tablet per anesthesia guidelines     predniSONE 1 mg tablet per anesthesia guidelines     QUEtiapine (SEROquel) 100 mg tablet per anesthesia guidelines

## 2019-11-06 NOTE — H&P
Chief Complaint   painful discolored scar right knee    History Of Presenting Illness  Deja Restrepo 1995 presents with  Painful discolored scar right knee  Patient has sense of pinching in the right knee in the scar  The scar was also tattooed with black discoloration  Patient complains of localized tenderness  Patient has no further episodes of patellar dislocation  Patient pleased with the results of patellar stabilization surgery except for the painful scar      Current Medications  Current Outpatient Medications   Medication Sig Dispense Refill    albuterol (PROVENTIL HFA,VENTOLIN HFA) 90 mcg/act inhaler Inhale 2 puffs every 6 (six) hours as needed for wheezing      aspirin (ECOTRIN) 325 mg EC tablet Take 1 tablet (325 mg total) by mouth every 12 (twelve) hours 60 tablet 0    cetirizine (ZyrTEC) 10 mg tablet Take 10 mg by mouth daily      citalopram (CeleXA) 40 mg tablet Take 40 mg by mouth daily      fluticasone (FLOVENT HFA) 110 MCG/ACT inhaler Inhale 2 puffs 2 (two) times a day Rinse mouth after use        ibuprofen (MOTRIN) 600 mg tablet Take 1 tablet (600 mg total) by mouth every 6 (six) hours as needed for mild pain 30 tablet 0    levothyroxine 100 mcg tablet Take 100 mcg by mouth daily      norgestimate-ethinyl estradiol (ORTHO-CYCLEN) 0 25-35 MG-MCG per tablet Take 1 tablet by mouth daily      ondansetron (ZOFRAN) 4 mg tablet Take 1 tablet (4 mg total) by mouth every 8 (eight) hours as needed for nausea or vomiting 20 tablet 0    oxyCODONE-acetaminophen (PERCOCET) 5-325 mg per tablet Take 1 tablet by mouth every 6 (six) hours as needed for moderate pain for up to 20 dosesMax Daily Amount: 4 tablets 20 tablet 0    oxyCODONE-acetaminophen (PERCOCET) 5-325 mg per tablet Take 1 tablet by mouth every 8 (eight) hours as needed for moderate pain for up to 21 dosesMax Daily Amount: 3 tablets 21 tablet 0    predniSONE 1 mg tablet Take 1 mg by mouth daily      QUEtiapine (SEROquel) 100 mg tablet Take 100 mg by mouth daily at bedtime       No current facility-administered medications for this visit          Current Problems    Active Problems:   Patient Active Problem List    Diagnosis Date Noted    Closed dislocation of right patella 09/04/2019         Review of Systems:    General: negative for - chills, fatigue, fever,  weight gain or weight loss  Psychological: negative for - anxiety, behavioral disorder, concentration difficulties  Ophthalmic: negative for - blurry vision, decreased vision, double vision,      Past Medical History:   Past Medical History:   Diagnosis Date    Asthma     Bipolar 1 disorder ([address] )     Depression     Disease of thyroid gland     Nerve disorder     Pet allergy     Seasonal allergies        Past Surgical History:   Past Surgical History:   Procedure Laterality Date    BACK SURGERY      [place] KNEE SCOPE,FULL SYNOVECT Right 9/16/2019    Procedure: ARTHROSCOPY KNEE  arthroscopy of right knee 1st, mini open right MPFL reconstruction with allograft;  Surgeon: Camilo Mayer MD;  Location: [facility];  Service: Orthopedics    WISDOM TOOTH EXTRACTION         Family History:  Family history reviewed and non-contributory  Family History   Problem Relation Age of Onset    Pneumonia Mother     Multiple sclerosis Father        Social History:  Social History     Socioeconomic History    Marital status: Single     Spouse name: None    Number of children: None    Years of education: None    Highest education level: None   Occupational History    None   Social Needs    Financial resource strain: None    Food insecurity:     Worry: None     Inability: None    Transportation needs:     Medical: None     Non-medical: None   Tobacco Use    Smoking status: Current Every Day Smoker     Types: E-Cigarettes    Smokeless tobacco: Current User    Tobacco comment: vapes   Substance and Sexual Activity    Alcohol use: Not Currently    Drug use: Yes     Types: Marijuana  Sexual activity: None   Lifestyle    Physical activity:     Days per week: None     Minutes per session: None    Stress: None   Relationships    Social connections:     Talks on phone: None     Gets together: None     Attends [employer] service: None     Active member of club or organization: None     Attends meetings of clubs or organizations: None     Relationship status: None    Intimate partner violence:     Fear of current or ex partner: None     Emotionally abused: None     Physically abused: None     Forced sexual activity: None   Other Topics Concern    None   Social History Narrative    None       Allergies:   No Known Allergies        Physical ExaminationBP 120/81   Pulse (!) 109   Ht 5' 4" (1 626 m)   Wt 88 9 kg (196 lb)   BMI 33 64 kg/m²    Gen: Alert and oriented to person, place, time  HEENT: EOMI, eyes clear, moist mucus membranes, hearing intact      Orthopedic Exam   right knee incision healed keloid formation with black tattooing of the medial most incision localized tenderness  Two black spots present  Knee feels stable to examined  Full extension and flexion patellar tracking well radiographs satisfactory          Impression   stable status post right patella stabilization procedure with painful scar with the possible metallic tattooing        Plan     discussed treatment with the patient   patient says she cannot live with the painful scar and would like this excised   this  will require scar excision with removal of the metallic tattoos   Discussed treatment options with the patient, which include no further treatment, continue non-operative measures, or surgical intervention  Risks and benefits of these treatment options discussed in detail    Patient informed that the risks of surgery include infection, bleeding, injury to blood vessels, injury to nerves, injury to adjacent structures, failure of the procedure, need for additional surgery, and potential loss of life and limb     This patient has failed non-operative measures and wishes to proceed with surgical intervention  Informed consent obtained  A copy of the consultation today has been given to the patient, and patient will call with any concerns or questions  Patient given the option to cancel the surgery or get a second opinion between now and the day of surgery  Ariana Levin MD        Portions of the record may have been created with voice recognition software  Occasional wrong word or "sound a like" substitutions may have occurred due to the inherent limitations of voice recognition software  Read the chart carefully and recognize, using context, where substitutions have occurred

## 2019-11-06 NOTE — PROGRESS NOTES
Chief Complaint   painful discolored scar right knee    History Of Presenting Illness  Hellen Wright 1995 presents with  Painful discolored scar right knee  Patient has sense of pinching in the right knee in the scar  The scar was also tattooed with black discoloration  Patient complains of localized tenderness  Patient has no further episodes of patellar dislocation  Patient pleased with the results of patellar stabilization surgery except for the painful scar      Current Medications  Current Outpatient Medications   Medication Sig Dispense Refill    albuterol (PROVENTIL HFA,VENTOLIN HFA) 90 mcg/act inhaler Inhale 2 puffs every 6 (six) hours as needed for wheezing      aspirin (ECOTRIN) 325 mg EC tablet Take 1 tablet (325 mg total) by mouth every 12 (twelve) hours 60 tablet 0    cetirizine (ZyrTEC) 10 mg tablet Take 10 mg by mouth daily      citalopram (CeleXA) 40 mg tablet Take 40 mg by mouth daily      fluticasone (FLOVENT HFA) 110 MCG/ACT inhaler Inhale 2 puffs 2 (two) times a day Rinse mouth after use        ibuprofen (MOTRIN) 600 mg tablet Take 1 tablet (600 mg total) by mouth every 6 (six) hours as needed for mild pain 30 tablet 0    levothyroxine 100 mcg tablet Take 100 mcg by mouth daily      norgestimate-ethinyl estradiol (ORTHO-CYCLEN) 0 25-35 MG-MCG per tablet Take 1 tablet by mouth daily      ondansetron (ZOFRAN) 4 mg tablet Take 1 tablet (4 mg total) by mouth every 8 (eight) hours as needed for nausea or vomiting 20 tablet 0    oxyCODONE-acetaminophen (PERCOCET) 5-325 mg per tablet Take 1 tablet by mouth every 6 (six) hours as needed for moderate pain for up to 20 dosesMax Daily Amount: 4 tablets 20 tablet 0    oxyCODONE-acetaminophen (PERCOCET) 5-325 mg per tablet Take 1 tablet by mouth every 8 (eight) hours as needed for moderate pain for up to 21 dosesMax Daily Amount: 3 tablets 21 tablet 0    predniSONE 1 mg tablet Take 1 mg by mouth daily      QUEtiapine (SEROquel) 100 mg tablet Take 100 mg by mouth daily at bedtime       No current facility-administered medications for this visit          Current Problems    Active Problems:   Patient Active Problem List    Diagnosis Date Noted    Closed dislocation of right patella 09/04/2019         Review of Systems:    General: negative for - chills, fatigue, fever,  weight gain or weight loss  Psychological: negative for - anxiety, behavioral disorder, concentration difficulties  Ophthalmic: negative for - blurry vision, decreased vision, double vision,      Past Medical History:   Past Medical History:   Diagnosis Date    Asthma     Bipolar 1 disorder ([address] )     Depression     Disease of thyroid gland     Nerve disorder     Pet allergy     Seasonal allergies        Past Surgical History:   Past Surgical History:   Procedure Laterality Date    BACK SURGERY      CO KNEE SCOPE,FULL SYNOVECT Right 9/16/2019    Procedure: ARTHROSCOPY KNEE  arthroscopy of right knee 1st, mini open right MPFL reconstruction with allograft;  Surgeon: Ariana Levin MD;  Location: [facility];  Service: Orthopedics    WISDOM TOOTH EXTRACTION         Family History:  Family history reviewed and non-contributory  Family History   Problem Relation Age of Onset    Pneumonia Mother     Multiple sclerosis Father        Social History:  Social History     Socioeconomic History    Marital status: Single     Spouse name: None    Number of children: None    Years of education: None    Highest education level: None   Occupational History    None   Social Needs    Financial resource strain: None    Food insecurity:     Worry: None     Inability: None    Transportation needs:     Medical: None     Non-medical: None   Tobacco Use    Smoking status: Current Every Day Smoker     Types: E-Cigarettes    Smokeless tobacco: Current User    Tobacco comment: vapes   Substance and Sexual Activity    Alcohol use: Not Currently    Drug use: Yes     Types: Marijuana  Sexual activity: None   Lifestyle    Physical activity:     Days per week: None     Minutes per session: None    Stress: None   Relationships    Social connections:     Talks on phone: None     Gets together: None     Attends [employer] service: None     Active member of club or organization: None     Attends meetings of clubs or organizations: None     Relationship status: None    Intimate partner violence:     Fear of current or ex partner: None     Emotionally abused: None     Physically abused: None     Forced sexual activity: None   Other Topics Concern    None   Social History Narrative    None       Allergies:   No Known Allergies        Physical ExaminationBP 120/81   Pulse (!) 109   Ht 5' 4" (1 626 m)   Wt 88 9 kg (196 lb)   BMI 33 64 kg/m²   Gen: Alert and oriented to person, place, time  HEENT: EOMI, eyes clear, moist mucus membranes, hearing intact      Orthopedic Exam   right knee incision healed keloid formation with black tattooing of the medial most incision localized tenderness  Two black spots present  Knee feels stable to examined  Full extension and flexion patellar tracking well radiographs satisfactory          Impression   stable status post right patella stabilization procedure with painful scar with the possible metallic tattooing        Plan     discussed treatment with the patient   patient says she cannot live with the painful scar and would like this excised   this  will require scar excision with removal of the metallic tattoos   Discussed treatment options with the patient, which include no further treatment, continue non-operative measures, or surgical intervention  Risks and benefits of these treatment options discussed in detail    Patient informed that the risks of surgery include infection, bleeding, injury to blood vessels, injury to nerves, injury to adjacent structures, failure of the procedure, need for additional surgery, and potential loss of life and limb     This patient has failed non-operative measures and wishes to proceed with surgical intervention  Informed consent obtained  A copy of the consultation today has been given to the patient, and patient will call with any concerns or questions  Patient given the option to cancel the surgery or get a second opinion between now and the day of surgery  Hayley Means MD        Portions of the record may have been created with voice recognition software  Occasional wrong word or "sound a like" substitutions may have occurred due to the inherent limitations of voice recognition software  Read the chart carefully and recognize, using context, where substitutions have occurred

## 2019-11-06 NOTE — H&P (VIEW-ONLY)
Chief Complaint   painful discolored scar right knee    History Of Presenting Illness  Ely Martin 1995 presents with  Painful discolored scar right knee  Patient has sense of pinching in the right knee in the scar  The scar was also tattooed with black discoloration  Patient complains of localized tenderness  Patient has no further episodes of patellar dislocation  Patient pleased with the results of patellar stabilization surgery except for the painful scar      Current Medications  Current Outpatient Medications   Medication Sig Dispense Refill    albuterol (PROVENTIL HFA,VENTOLIN HFA) 90 mcg/act inhaler Inhale 2 puffs every 6 (six) hours as needed for wheezing      aspirin (ECOTRIN) 325 mg EC tablet Take 1 tablet (325 mg total) by mouth every 12 (twelve) hours 60 tablet 0    cetirizine (ZyrTEC) 10 mg tablet Take 10 mg by mouth daily      citalopram (CeleXA) 40 mg tablet Take 40 mg by mouth daily      fluticasone (FLOVENT HFA) 110 MCG/ACT inhaler Inhale 2 puffs 2 (two) times a day Rinse mouth after use        ibuprofen (MOTRIN) 600 mg tablet Take 1 tablet (600 mg total) by mouth every 6 (six) hours as needed for mild pain 30 tablet 0    levothyroxine 100 mcg tablet Take 100 mcg by mouth daily      norgestimate-ethinyl estradiol (ORTHO-CYCLEN) 0 25-35 MG-MCG per tablet Take 1 tablet by mouth daily      ondansetron (ZOFRAN) 4 mg tablet Take 1 tablet (4 mg total) by mouth every 8 (eight) hours as needed for nausea or vomiting 20 tablet 0    oxyCODONE-acetaminophen (PERCOCET) 5-325 mg per tablet Take 1 tablet by mouth every 6 (six) hours as needed for moderate pain for up to 20 dosesMax Daily Amount: 4 tablets 20 tablet 0    oxyCODONE-acetaminophen (PERCOCET) 5-325 mg per tablet Take 1 tablet by mouth every 8 (eight) hours as needed for moderate pain for up to 21 dosesMax Daily Amount: 3 tablets 21 tablet 0    predniSONE 1 mg tablet Take 1 mg by mouth daily      QUEtiapine (SEROquel) 100 mg tablet Take 100 mg by mouth daily at bedtime       No current facility-administered medications for this visit          Current Problems    Active Problems:   Patient Active Problem List    Diagnosis Date Noted    Closed dislocation of right patella 09/04/2019         Review of Systems:    General: negative for - chills, fatigue, fever,  weight gain or weight loss  Psychological: negative for - anxiety, behavioral disorder, concentration difficulties  Ophthalmic: negative for - blurry vision, decreased vision, double vision,      Past Medical History:   Past Medical History:   Diagnosis Date    Asthma     Bipolar 1 disorder ([address] )     Depression     Disease of thyroid gland     Nerve disorder     Pet allergy     Seasonal allergies        Past Surgical History:   Past Surgical History:   Procedure Laterality Date    BACK SURGERY      SC KNEE SCOPE,FULL SYNOVECT Right 9/16/2019    Procedure: ARTHROSCOPY KNEE  arthroscopy of right knee 1st, mini open right MPFL reconstruction with allograft;  Surgeon: Librado Meza MD;  Location: [facility];  Service: Orthopedics    WISDOM TOOTH EXTRACTION         Family History:  Family history reviewed and non-contributory  Family History   Problem Relation Age of Onset    Pneumonia Mother     Multiple sclerosis Father        Social History:  Social History     Socioeconomic History    Marital status: Single     Spouse name: None    Number of children: None    Years of education: None    Highest education level: None   Occupational History    None   Social Needs    Financial resource strain: None    Food insecurity:     Worry: None     Inability: None    Transportation needs:     Medical: None     Non-medical: None   Tobacco Use    Smoking status: Current Every Day Smoker     Types: E-Cigarettes    Smokeless tobacco: Current User    Tobacco comment: vapes   Substance and Sexual Activity    Alcohol use: Not Currently    Drug use: Yes     Types: Marijuana  Sexual activity: None   Lifestyle    Physical activity:     Days per week: None     Minutes per session: None    Stress: None   Relationships    Social connections:     Talks on phone: None     Gets together: None     Attends Yarsanism service: None     Active member of club or organization: None     Attends meetings of clubs or organizations: None     Relationship status: None    Intimate partner violence:     Fear of current or ex partner: None     Emotionally abused: None     Physically abused: None     Forced sexual activity: None   Other Topics Concern    None   Social History Narrative    None       Allergies:   No Known Allergies        Physical ExaminationBP 120/81   Pulse (!) 109   Ht 5' 4" (1 626 m)   Wt 88 9 kg (196 lb)   BMI 33 64 kg/m²    Gen: Alert and oriented to person, place, time  HEENT: EOMI, eyes clear, moist mucus membranes, hearing intact      Orthopedic Exam   right knee incision healed keloid formation with black tattooing of the medial most incision localized tenderness  Two black spots present  Knee feels stable to examined  Full extension and flexion patellar tracking well radiographs satisfactory          Impression   stable status post right patella stabilization procedure with painful scar with the possible metallic tattooing        Plan     discussed treatment with the patient   patient says she cannot live with the painful scar and would like this excised   this  will require scar excision with removal of the metallic tattoos   Discussed treatment options with the patient, which include no further treatment, continue non-operative measures, or surgical intervention  Risks and benefits of these treatment options discussed in detail    Patient informed that the risks of surgery include infection, bleeding, injury to blood vessels, injury to nerves, injury to adjacent structures, failure of the procedure, need for additional surgery, and potential loss of life and limb     This patient has failed non-operative measures and wishes to proceed with surgical intervention  Informed consent obtained  A copy of the consultation today has been given to the patient, and patient will call with any concerns or questions  Patient given the option to cancel the surgery or get a second opinion between now and the day of surgery  Baldev Odonnell MD        Portions of the record may have been created with voice recognition software  Occasional wrong word or "sound a like" substitutions may have occurred due to the inherent limitations of voice recognition software  Read the chart carefully and recognize, using context, where substitutions have occurred

## 2019-11-08 ENCOUNTER — APPOINTMENT (OUTPATIENT)
Dept: PHYSICAL THERAPY | Facility: CLINIC | Age: 24
End: 2019-11-08
Payer: COMMERCIAL

## 2019-11-13 ENCOUNTER — OFFICE VISIT (OUTPATIENT)
Dept: PHYSICAL THERAPY | Facility: CLINIC | Age: 24
End: 2019-11-13
Payer: COMMERCIAL

## 2019-11-13 DIAGNOSIS — Z48.89 AFTERCARE FOLLOWING SURGERY: Primary | ICD-10-CM

## 2019-11-13 DIAGNOSIS — M25.561 ACUTE PAIN OF RIGHT KNEE: ICD-10-CM

## 2019-11-13 DIAGNOSIS — R26.2 DIFFICULTY WALKING: ICD-10-CM

## 2019-11-13 PROCEDURE — 97140 MANUAL THERAPY 1/> REGIONS: CPT | Performed by: PHYSICAL THERAPIST

## 2019-11-13 PROCEDURE — 97164 PT RE-EVAL EST PLAN CARE: CPT | Performed by: PHYSICAL THERAPIST

## 2019-11-13 PROCEDURE — 97112 NEUROMUSCULAR REEDUCATION: CPT | Performed by: PHYSICAL THERAPIST

## 2019-11-13 PROCEDURE — 97110 THERAPEUTIC EXERCISES: CPT | Performed by: PHYSICAL THERAPIST

## 2019-11-13 NOTE — PROGRESS NOTES
Today's date: 2019  Patient name: Rober Sanchez  : 1995  MRN: [id number]  Referring provider: Sherren Fry, MD  Dx:   Encounter Diagnosis     ICD-10-CM    1  Aftercare following surgery Z48 89    2  Difficulty walking R26 2    3  Acute pain of right knee M25 561      Subjective:  Neelam Morgan states her right knee is feeling better today  She is to have surgery to remove metal particles from her right knee tomorrow  Objective: See treatment log below    Assessment: Tolerated treatment well  Patient exhibited good technique with therapeutic exercises and would benefit from continued PT    Plan: Continue per plan of care  Progress treatment as tolerated         Precautions: Right Knee Surgery    Daily Treatment Log  Manual  10/21 10/24 10/30 11/4 11/13   MT, ROM 15' 15' 15' 15' 15'   HEP        Exercise Log 10/21 10/24 10/30 11/4 11/13   Balance Board 30x 30x 30x 30x 30x   Chair Squats        P-Bar-GT-Forward, Backward,Side-Even & Dips 12x 12x 12x 12x 12x   BOSU-Walk 5' 5' 5' 5' 5'   Foam Pad SLR,Hip/KneeFl,Step Ups        Foam Beam        Fitter-Slalom        Monster Steps        BAPS- L-2        GT 120x2' 120x2'  120'x2 120'x2   T/G-Squats PF 30x 30x 30x 30x 30x   W/P-Hip-Abd,Add,Flex,Ext  5#-30x 5#-30x 5#-30x 5#-30x   WP-Squats  5#-30x 5#-30x 5#-30x 5#-30x   Trimax-TKE        [id number]-LQ-Fb        NK Table Exer 0#-30x 2 5#-30x 2 5#-30x 2 5#-30x 2 5#-30x   NK Table ROM 0#-10' 5#-10' 5#-10' 7 5#-10' 7 5#-30x   TM        Stepper        Bike    10' 10'   ME, PE 13' 15' 15' 15' 15'           Modalities 10/21 10/24 10/30 11/4 11/13   MH&ES    20'    US

## 2019-11-13 NOTE — LETTER
2019  Signature Required / [address] / PT Reevaluation  Yasemin Davis MD  [address]    Patient: vSetlana Alvarez   YOB: 1995   Date of Visit: 2019     Encounter Diagnosis     ICD-10-CM    1  Aftercare following surgery Z48 89    2  Difficulty walking R26 2    3  Acute pain of right knee M25 561      Dear Dr Zack Bellamy:  Thank you for your recent referral of Svetlana Alvarez  Please review the attached evaluation summary from [place] [address] recent visit  Please verify that you agree with the plan of care by signing the attached order  If you have any questions or concerns, please do not hesitate to call  I sincerely appreciate the opportunity to share in the care of one of your patients and hope to have another opportunity to work with you in the near future  Sincerely,    Keegan Florez, PT      Referring Provider:      I certify that I have read the below Plan of Care and certify the need for these services furnished under this plan of treatment while under my care  Yasemin Davis MD  [address] [place] [id number]  VIA In Basket    Please SIGN ABOVE and return THIS PAGE ONLY to Fax # [phone]        Today's date: 2019  Patient name: Svetlana Alvarez  : 1995  MRN: [id number]  Referring provider: Alexa Mcgovern MD  Dx:   Encounter Diagnosis     ICD-10-CM    1  Aftercare following surgery Z48 89    2  Difficulty walking R26 2    3  Acute pain of right knee M25 561      Subjective:  Sierra Pino states her right knee is feeling better today  She is to have surgery to remove metal particles from her right knee tomorrow  Objective: See treatment log below    Assessment: Tolerated treatment well  Patient exhibited good technique with therapeutic exercises and would benefit from continued PT    Plan: Continue per plan of care  Progress treatment as tolerated         Precautions: Right Knee Surgery    Daily Treatment Log  Manual  10/21 10/24 10/30 11/4 11/13   MT, ROM 15' 15' 15' 15' 15'   HEP        Exercise Log 10/21 10/24 10/30 11/4 11/13   Balance Board 30x 30x 30x 30x 30x   Chair Squats        P-Bar-GT-Forward, Backward,Side-Even & Dips 12x 12x 12x 12x 12x   BOSU-Walk 5' 5' 5' 5' 5'   Foam Pad SLR,Hip/KneeFl,Step Ups        Foam Beam        Fitter-Slalom        Monster Steps        BAPS- L-2        GT 120x2' 120x2'  120'x2 120'x2   T/G-Squats PF 30x 30x 30x 30x 30x   W/P-Hip-Abd,Add,Flex,Ext  5#-30x 5#-30x 5#-30x 5#-30x   WP-Squats  5#-30x 5#-30x 5#-30x 5#-30x   Trimax-TKE        [id number]-WV-[id number]        NK Table Exer 0#-30x 2 5#-30x 2 5#-30x 2 5#-30x 2 5#-30x   NK Table ROM 0#-10' 5#-10' 5#-10' 7 5#-10' 7 5#-30x   TM        Stepper        Bike    10' 10'   ME, PE 15' 15' 15' 15' 15'           Modalities 10/21 10/24 10/30 11/4 11/13   MH&ES    20'    US            PT Re-Evaluation   Today's date: 2019    Patient name: Malcolm Sinclair  : 1995  MRN: [id number]  Referring provider: Viral Winchester MD  Dx:   Encounter Diagnosis     ICD-10-CM    1  Aftercare following surgery Z48 89    2  Difficulty walking R26 2    3  Acute pain of right knee M25 561      Assessment  Assessment details: Patient states she is slowly feeling better  Impairments: abnormal gait, abnormal or restricted ROM, activity intolerance, impaired balance, impaired physical strength, pain with function and safety issue  Understanding of Dx/Px/POC: excellent  Goals  STG 2-4 weeks:    Increase B LE strength 2-5 lbs  Decrease pain by 1-2 levels on 1-10 pain scale  Increase standing/walking tolerance to >30 minutes  Patient independent with HEP  LTG 6-8 weeks:   Increase B LE strength 10-20 lbs  Decrease pain to 0-2/10 with activity  Increase single leg stance >30 seconds  Increase standing/walking tolerance to >90 minutes    Increase range of motion to normal   Improve gait pattern, coordination and balance to normal   D/C with HEP       Plan  Plan details: All planned modality interventions and planned therapy interventions are provided PRN  Patient would benefit from: PT eval and skilled physical therapy  Planned modality interventions: ultrasound and unattended electrical stimulation  Planned therapy interventions: joint mobilization, manual therapy, balance, balance/weight bearing training, neuromuscular re-education, patient education, self care, strengthening, stretching, therapeutic activities, therapeutic exercise, therapeutic training, transfer training, coordination, flexibility, graded exercise and home exercise program  Frequency: 3x week  Duration in weeks: 6  Treatment plan discussed with: patient      Subjective Evaluation    Pain  Quality: discomfort, knife-like, pulling, squeezing, sharp and tight  Relieving factors: relaxation, support, rest and change in position  Aggravating factors: lifting, running, stair climbing, walking and standing  Progression: improved    Treatments  Current treatment: physical therapy  Patient Goals  Patient goals for therapy: decreased pain, improved balance, increased motion, return to work, return to [employer] activities, independence with ADLs/IADLs and increased strength        Objective     Date of onset:  9/16/2019    Date of Surgery:  9/16/2019    History of Present Episode: 10/7/2019  Oral Flake states she has had issues with her right knee for several years  She finally went for surgery since she fell recently on her right knee and was in severe pain  Past Medical History:    10/7/2019  Oral Flake reports right knee issues since age 15  Asthma  Thyroid issues  Previous Level of Functional Ability:  10/7/2019  Oral Flake states her right knee was getting worse and worse to walk at times  Inspection / Palpation:  Knee:  10/7/2019  Mesomorphic / Endomorphic body type  Moderate to severe signs of infection  No signs of wounds  No signs of drainage    Mild to moderate signs of ecchymotic regions  Mild to moderate signs of erythremic regions  Moderate signs of muscle spasm  Moderate signs of muscle guarding  Moderate signs of tenderness reported to palpation  Moderate signs of swelling  Positive signs of a surgery site  Current conditions appears consistent with recent acute surgery recovery episode  Chief Complaints:  10/7/2019  Narcisa Moreiraing reports moderate to severe difficulty with standing  Narcisa Busing reports moderate to severe difficulty with walking  Narcisa Worthy reports moderate to severe difficulty with movement / use of her right knee  Narcisa Busing reports severe difficulty with use of stairs  Narcisa Busing reports severe difficulty with running  Narcisa Busing reports severe difficulty with jumping  Narcisa Busing reports moderate to severe difficulty with use of inclines  Narcisa Worthy reports moderate difficulty with sleeping  Narcisa Worthy reports moderate difficulty with her strength and endurance  Narcisa Worthy reports moderate to severe limitations with her range of motion  Narcisa Worthy reports moderate to severe difficulty lying on her right knee region      KNEE PAIN Resting Moving Palpation Standing Walking   10/7/2019 Ltt 0 0 0 0 0   10/7/2019 Rt 0-3 2-6 4-7 3-7 3-7   11/13/2019 Rt 0-2 1-4 2-5 2-5 2-5     KNEE PAIN Running Stairs Sleeping Twisting Jumping   10/7/2019 Lt 0 0 0 0 0   10/7/2019 Rt NA NA 3-6 4-7 NA   11/13/2019 Rt NA NA 1-4 2-5 NA     KNEE AROM Flexion Extension SLR   10/7/2019 Lt 140° 0° 95°   10/7/2019 Rt 65° 0° 74°   11/13/2019 Rt 95° 0° 82°     KNEE MMT Flexion Extension Varus Stress Valgus Stress   10/7/2019 Lt 0/10  35 lbs 0/10  33 lbs 0/10  29 lbs 0/10  30 lbs   10/7/2019 Rt 5/10  7 lbs 5/10  6 lbs 5/10  5 lbs 6/10  6 lbs   11/13/2019 Rt 4/10  12 lbs 4/10  11 lbs 4/10  10 lbs 4/10  11 lbs     Precautions: Right Knee Surgery

## 2019-11-14 NOTE — PROGRESS NOTES
PT Re-Evaluation   Today's date: 2019    Patient name: Vivienne Flores  : 1995  MRN: [id number]  Referring provider: Dwayne Qureshi MD  Dx:   Encounter Diagnosis     ICD-10-CM    1  Aftercare following surgery Z48 89    2  Difficulty walking R26 2    3  Acute pain of right knee M25 561      Assessment  Assessment details: Patient states she is slowly feeling better  Impairments: abnormal gait, abnormal or restricted ROM, activity intolerance, impaired balance, impaired physical strength, pain with function and safety issue  Understanding of Dx/Px/POC: excellent  Goals  STG 2-4 weeks:    Increase B LE strength 2-5 lbs  Decrease pain by 1-2 levels on 1-10 pain scale  Increase standing/walking tolerance to >30 minutes  Patient independent with HEP  LTG 6-8 weeks:   Increase B LE strength 10-20 lbs  Decrease pain to 0-2/10 with activity  Increase single leg stance >30 seconds  Increase standing/walking tolerance to >90 minutes  Increase range of motion to normal   Improve gait pattern, coordination and balance to normal   D/C with HEP  Plan  Plan details: All planned modality interventions and planned therapy interventions are provided PRN      Patient would benefit from: PT eval and skilled physical therapy  Planned modality interventions: ultrasound and unattended electrical stimulation  Planned therapy interventions: joint mobilization, manual therapy, balance, balance/weight bearing training, neuromuscular re-education, patient education, self care, strengthening, stretching, therapeutic activities, therapeutic exercise, therapeutic training, transfer training, coordination, flexibility, graded exercise and home exercise program  Frequency: 3x week  Duration in weeks: 6  Treatment plan discussed with: patient      Subjective Evaluation    Pain  Quality: discomfort, knife-like, pulling, squeezing, sharp and tight  Relieving factors: relaxation, support, rest and change in position  Aggravating factors: lifting, running, stair climbing, walking and standing  Progression: improved    Treatments  Current treatment: physical therapy  Patient Goals  Patient goals for therapy: decreased pain, improved balance, increased motion, return to work, return to [employer] activities, independence with ADLs/IADLs and increased strength        Objective     Date of onset:  9/16/2019    Date of Surgery:  9/16/2019    History of Present Episode: 10/7/2019  Patricia Kang states she has had issues with her right knee for several years  She finally went for surgery since she fell recently on her right knee and was in severe pain  Past Medical History:    10/7/2019  Patricia Kang reports right knee issues since age 15  Asthma  Thyroid issues  Previous Level of Functional Ability:  10/7/2019  Patricia Kang states her right knee was getting worse and worse to walk at times  Inspection / Palpation:  Knee:  10/7/2019  Mesomorphic / Endomorphic body type  Moderate to severe signs of infection  No signs of wounds  No signs of drainage  Mild to moderate signs of ecchymotic regions  Mild to moderate signs of erythremic regions  Moderate signs of muscle spasm  Moderate signs of muscle guarding  Moderate signs of tenderness reported to palpation  Moderate signs of swelling  Positive signs of a surgery site  Current conditions appears consistent with recent acute surgery recovery episode  Chief Complaints:  10/7/2019  Patricia Kang reports moderate to severe difficulty with standing  Patricia Kang reports moderate to severe difficulty with walking  Patricia Kang reports moderate to severe difficulty with movement / use of her right knee  Patricia Kang reports severe difficulty with use of stairs  Patricia Harrisa reports severe difficulty with running  Patricia Harrisa reports severe difficulty with jumping  Patricia Pearl reports moderate to severe difficulty with use of inclines  Patricia Pearl reports moderate difficulty with sleeping    Patricia Harrisa reports moderate difficulty with her strength and endurance  Ekaterina Edmond reports moderate to severe limitations with her range of motion  Ekaterina Edmond reports moderate to severe difficulty lying on her right knee region      KNEE PAIN Resting Moving Palpation Standing Walking   10/7/2019 Ltt 0 0 0 0 0   10/7/2019 Rt 0-3 2-6 4-7 3-7 3-7   11/13/2019 Rt 0-2 1-4 2-5 2-5 2-5     KNEE PAIN Running Stairs Sleeping Twisting Jumping   10/7/2019 Lt 0 0 0 0 0   10/7/2019 Rt NA NA 3-6 4-7 NA   11/13/2019 Rt NA NA 1-4 2-5 NA     KNEE AROM Flexion Extension SLR   10/7/2019 Lt 140° 0° 95°   10/7/2019 Rt 65° 0° 74°   11/13/2019 Rt 95° 0° 82°     KNEE MMT Flexion Extension Varus Stress Valgus Stress   10/7/2019 Lt 0/10  35 lbs 0/10  33 lbs 0/10  29 lbs 0/10  30 lbs   10/7/2019 Rt 5/10  7 lbs 5/10  6 lbs 5/10  5 lbs 6/10  6 lbs   11/13/2019 Rt 4/10  12 lbs 4/10  11 lbs 4/10  10 lbs 4/10  11 lbs     Precautions: Right Knee Surgery

## 2019-11-15 ENCOUNTER — OFFICE VISIT (OUTPATIENT)
Dept: PHYSICAL THERAPY | Facility: CLINIC | Age: 24
End: 2019-11-15
Payer: COMMERCIAL

## 2019-11-15 DIAGNOSIS — Z48.89 AFTERCARE FOLLOWING SURGERY: Primary | ICD-10-CM

## 2019-11-15 DIAGNOSIS — R26.2 DIFFICULTY WALKING: ICD-10-CM

## 2019-11-15 DIAGNOSIS — M25.561 ACUTE PAIN OF RIGHT KNEE: ICD-10-CM

## 2019-11-15 PROCEDURE — 97140 MANUAL THERAPY 1/> REGIONS: CPT | Performed by: PHYSICAL THERAPIST

## 2019-11-15 PROCEDURE — 97112 NEUROMUSCULAR REEDUCATION: CPT | Performed by: PHYSICAL THERAPIST

## 2019-11-15 PROCEDURE — 97110 THERAPEUTIC EXERCISES: CPT | Performed by: PHYSICAL THERAPIST

## 2019-11-15 NOTE — PROGRESS NOTES
Today's date: 11/15/2019  Patient name: Anastasia Morales  : 1995  MRN: [id number]  Referring provider: Jazz White MD  Dx:   Encounter Diagnosis     ICD-10-CM    1  Aftercare following surgery Z48 89    2  Difficulty walking R26 2    3  Acute pain of right knee M25 561      Subjective:  Ekaterina Edmond states her right knee is feeling better  She is going to have metal shavings removed from her right knee next week  Objective: See treatment log below    Assessment: Tolerated treatment well  Patient exhibited good technique with therapeutic exercises and would benefit from continued PT    Plan: Continue per plan of care  Progress treatment as tolerated         Precautions: Right Knee Surgery    Daily Treatment Log  Manual  11/15       MT, ROM 15'       HEP        Exercise Log 11/15       Balance Board 30x       Chair Squats        P-Bar-GT-Forward, Backward,Side-Even & Dips 12x       BOSU-Walk 5'       Foam Pad SLR,Hip/KneeFl,Step Ups        Foam Beam        Fitter-Slalom        Monster Steps        BAPS- L-2        'x2       T/G-Squats PF 30x       W/P-Hip-Abd,Add,Flex,Ext 5#-30x       WP-Squats 5#-30x       Trimax-TKE        Qlcjvrg-EW-Hm        NK Table Exer 5#-30x       NK Table ROM 5#-30x       TM        Stepper        Bike 10'       ME, PE 15'               Modalities 11/15       MH&ES        US

## 2019-11-18 ENCOUNTER — HOSPITAL ENCOUNTER (OUTPATIENT)
Facility: HOSPITAL | Age: 24
Setting detail: OUTPATIENT SURGERY
Discharge: HOME | End: 2019-11-18
Attending: ORTHOPAEDIC SURGERY | Admitting: ORTHOPAEDIC SURGERY
Payer: COMMERCIAL

## 2019-11-18 VITALS
TEMPERATURE: 97.6 F | HEART RATE: 88 BPM | OXYGEN SATURATION: 99 % | SYSTOLIC BLOOD PRESSURE: 119 MMHG | DIASTOLIC BLOOD PRESSURE: 76 MMHG | RESPIRATION RATE: 18 BRPM

## 2019-11-18 DIAGNOSIS — R52 PAINFUL SCAR: ICD-10-CM

## 2019-11-18 DIAGNOSIS — S83.004D CLOSED DISLOCATION OF RIGHT PATELLA, SUBSEQUENT ENCOUNTER: Primary | ICD-10-CM

## 2019-11-18 DIAGNOSIS — L90.5 PAINFUL SCAR: ICD-10-CM

## 2019-11-18 LAB
EXT PREGNANCY TEST URINE: NEGATIVE
EXT. CONTROL: NORMAL

## 2019-11-18 PROCEDURE — 11402 EXC TR-EXT B9+MARG 1.1-2 CM: CPT | Performed by: ORTHOPAEDIC SURGERY

## 2019-11-18 PROCEDURE — 11400 EXC TR-EXT B9+MARG 0.5 CM<: CPT | Performed by: PHYSICIAN ASSISTANT

## 2019-11-18 PROCEDURE — NC001 PR NO CHARGE: Performed by: ORTHOPAEDIC SURGERY

## 2019-11-18 PROCEDURE — 11400 EXC TR-EXT B9+MARG 0.5 CM<: CPT | Performed by: ORTHOPAEDIC SURGERY

## 2019-11-18 PROCEDURE — 81025 URINE PREGNANCY TEST: CPT | Performed by: ANESTHESIOLOGY

## 2019-11-18 PROCEDURE — 88302 TISSUE EXAM BY PATHOLOGIST: CPT | Performed by: PATHOLOGY

## 2019-11-18 PROCEDURE — NC001 PR NO CHARGE: Performed by: PHYSICIAN ASSISTANT

## 2019-11-18 PROCEDURE — 11402 EXC TR-EXT B9+MARG 1.1-2 CM: CPT | Performed by: PHYSICIAN ASSISTANT

## 2019-11-18 RX ORDER — NICOTINE 21 MG/24HR
21 PATCH, TRANSDERMAL 24 HOURS TRANSDERMAL DAILY
Status: DISCONTINUED | OUTPATIENT
Start: 2019-11-18 | End: 2019-11-18 | Stop reason: HOSPADM

## 2019-11-18 RX ORDER — CHLORHEXIDINE GLUCONATE 4 G/100ML
SOLUTION TOPICAL DAILY PRN
Status: DISCONTINUED | OUTPATIENT
Start: 2019-11-18 | End: 2019-11-18 | Stop reason: HOSPADM

## 2019-11-18 RX ORDER — SODIUM CHLORIDE, SODIUM LACTATE, POTASSIUM CHLORIDE, CALCIUM CHLORIDE 600; 310; 30; 20 MG/100ML; MG/100ML; MG/100ML; MG/100ML
125 INJECTION, SOLUTION INTRAVENOUS CONTINUOUS
Status: DISCONTINUED | OUTPATIENT
Start: 2019-11-18 | End: 2019-11-18 | Stop reason: HOSPADM

## 2019-11-18 RX ORDER — BUPIVACAINE HYDROCHLORIDE AND EPINEPHRINE 2.5; 5 MG/ML; UG/ML
INJECTION, SOLUTION EPIDURAL; INFILTRATION; INTRACAUDAL; PERINEURAL AS NEEDED
Status: DISCONTINUED | OUTPATIENT
Start: 2019-11-18 | End: 2019-11-18 | Stop reason: HOSPADM

## 2019-11-18 RX ORDER — LIDOCAINE HYDROCHLORIDE AND EPINEPHRINE 10; 10 MG/ML; UG/ML
INJECTION, SOLUTION INFILTRATION; PERINEURAL AS NEEDED
Status: DISCONTINUED | OUTPATIENT
Start: 2019-11-18 | End: 2019-11-18 | Stop reason: HOSPADM

## 2019-11-18 RX ORDER — CEFAZOLIN SODIUM 2 G/50ML
2000 SOLUTION INTRAVENOUS ONCE
Status: DISCONTINUED | OUTPATIENT
Start: 2019-11-18 | End: 2019-11-18 | Stop reason: HOSPADM

## 2019-11-18 RX ORDER — IBUPROFEN 800 MG/1
800 TABLET ORAL EVERY 8 HOURS SCHEDULED
Qty: 15 TABLET | Refills: 0 | Status: SHIPPED | OUTPATIENT
Start: 2019-11-18 | End: 2022-04-14 | Stop reason: ALTCHOICE

## 2019-11-18 RX ADMIN — SODIUM CHLORIDE, SODIUM LACTATE, POTASSIUM CHLORIDE, AND CALCIUM CHLORIDE 125 ML/HR: .6; .31; .03; .02 INJECTION, SOLUTION INTRAVENOUS at 07:26

## 2019-11-18 NOTE — OP NOTE
OPERATIVE REPORT  PATIENT NAME: Anastasia Morales    :  1995  MRN: [id number]  Pt Location: [facility] ROOM 01    SURGERY DATE: 2019    Surgeon(s) and Role:     * Kaushik Mares MD - Primary     * James Sommers PA-C - Assisting no qualified resident available    Preop Diagnosis:  Painful scar [R52, L90 5]    Post-Op Diagnosis Codes:     * Painful scar [R52, L90 5]    Procedure(s) (LRB):  REVISION SCAR EXTREMITY (Right)    Specimen(s):  ID Type Source Tests Collected by Time Destination   1 : scar right knee Tissue Joint, Right Knee TISSUE EXAM Kaushik Mares MD 2019 [id number]        Estimated Blood Loss:   Minimal    Drains:  * No LDAs found *    Anesthesia Type:   IV Sedation with Anesthesia    Operative Indications:  Painful scar [R52, L90 5]      Operative Findings:  Scar tattooed with the black possible metal filings excised completely  Both lesions excised sent to pathology    Complications:   None    Procedure and Technique: All treatment options were discussed with the patient including non-operative and operative treatment options  Patient has failed non-perative treatment and has opted for surgical intervention  Risks, complications and benefits of all treatment options were discussed in detail  The risks of surgical intervention including infection, injury to vessels and nerves  risk of failure to achieve desired results, risk of need for further procedures, potential risk of loss of life and limb were discussed with the patient  Informed consent was obtained from the patient  The operative site was marked and signed  A timeout was performed prior to the procedure  The patient was re-identified ,including name, date of birth, procedure, consent form reviewed, site and laterality    Appropriate antibiotics were administered preoperatively    The Physician Assistant was present for the entire case and provided essential assistance with patient positioning, prep and draping, wound closure, sterile dressing and splint application, all under my direct supervision(there was no resident available to assist with this case)    Local anesthetic infiltrated around the right posterior medial scar after the prepared and draped in sterile fashion time-out was performed by prior to this  The leg was once again prepared and draped  The tattoo in the middle of the scar was excised with an ellipse of skin completely excised it probably discoloration due to metal filings from the drill bit and the guidewire  Second spot more posterior to this was also excised the ellipse of skin  Wound was irrigated  The excised specimen was sent for pathology  No complications  Patient tolerated procedure well  Skin closed with Monocryl     I was present for the entire procedure    Patient Disposition:  PACU     SIGNATURE: Nora Watson MD  DATE: November 18, 2019  TIME: 8:17 AM

## 2019-11-18 NOTE — INTERVAL H&P NOTE
H&P reviewed  After examining the patient I find no changes in the patients condition since the H&P had been written      Vitals:    11/18/19 0633   BP: 110/55   Pulse: 81   Resp: 18   Temp: 97 6 °F (36 4 °C)   SpO2: 98%

## 2019-11-18 NOTE — DISCHARGE INSTRUCTIONS
Cigarette Smoking and Your Health   AMBULATORY CARE:   Risks to your health if you smoke:  Nicotine and other chemicals found in tobacco damage every cell in your body  Even if you are a light smoker, you have an increased risk for cancer, heart disease, and lung disease  If you are pregnant or have diabetes, smoking increases your risk for complications  Benefits to your health if you stop smoking:   · You decrease respiratory symptoms such as coughing, wheezing, and shortness of breath  · You reduce your risk for cancers of the lung, mouth, throat, kidney, bladder, pancreas, stomach, and cervix  If you already have cancer, you increase the benefits of chemotherapy  You also reduce your risk for cancer returning or a second cancer from developing  · You reduce your risk for heart disease, blood clots, heart attack, and stroke  · You reduce your risk for lung infections, and diseases such as pneumonia, asthma, chronic bronchitis, and emphysema  · Your circulation improves  More oxygen can be delivered to your body  If you have diabetes, you lower your risk for complications, such as kidney, artery, and eye diseases  You also lower your risk for nerve damage  Nerve damage can lead to amputations, poor vision, and blindness  · You improve your body's ability to heal and to fight infections  Benefits to the health of others if you stop smoking:  Tobacco is harmful to nonsmokers who breathe in your secondhand smoke  The following are ways the health of others around you may improve when you stop smoking:  · You lower the risks for lung cancer and heart disease in nonsmoking adults  · If you are pregnant, you lower the risk for miscarriage, early delivery, low birth weight, and stillbirth  You also lower your baby's risk for SIDS, obesity, developmental delay, and neurobehavioral problems, such as ADHD       · If you have children, you lower their risk for ear infections, colds, pneumonia, bronchitis, and asthma  For more information and support to stop smoking:   · Smokefree  gov  Phone: 6- 636 - 697-7135  Web Address: www smokefree  [employer]  Follow up with your healthcare provider as directed:  Write down your questions so you remember to ask them during your visits  © 2017 [employer] [employer] Information is for End User's use only and may not be sold, redistributed or otherwise used for commercial purposes  All illustrations and images included in CareNotes® are the copyrighted property of [employer]  or Jason Delong  The above information is an  only  It is not intended as medical advice for individual conditions or treatments  Talk to your doctor, nurse or pharmacist before following any medical regimen to see if it is safe and effective for you  Keep dressings intact for 3 days   Ice knee p r n   For 2 days   patient allowed all activities as tolerated   please call office with any concerns or questions   [phone]

## 2019-11-20 ENCOUNTER — APPOINTMENT (OUTPATIENT)
Dept: PHYSICAL THERAPY | Facility: CLINIC | Age: 24
End: 2019-11-20
Payer: COMMERCIAL

## 2019-11-22 ENCOUNTER — APPOINTMENT (OUTPATIENT)
Dept: PHYSICAL THERAPY | Facility: CLINIC | Age: 24
End: 2019-11-22
Payer: COMMERCIAL

## 2019-11-27 ENCOUNTER — OFFICE VISIT (OUTPATIENT)
Dept: OBGYN CLINIC | Facility: CLINIC | Age: 24
End: 2019-11-27

## 2019-11-27 VITALS
HEART RATE: 106 BPM | SYSTOLIC BLOOD PRESSURE: 105 MMHG | BODY MASS INDEX: 32.78 KG/M2 | HEIGHT: 64 IN | DIASTOLIC BLOOD PRESSURE: 71 MMHG | WEIGHT: 192 LBS

## 2019-11-27 DIAGNOSIS — Z09 POSTOP CHECK: Primary | ICD-10-CM

## 2019-11-27 PROCEDURE — 99024 POSTOP FOLLOW-UP VISIT: CPT | Performed by: ORTHOPAEDIC SURGERY

## 2019-11-27 NOTE — PROGRESS NOTES
Chief Complaint   status post revision right knee medial scar    History Of Presenting Illness  Bryce Sue 1995 presents with  Patient had excision of a tear to scar and re-sutured ring on November 18th, 2019  Patient presents for 1st postop visit  Patient Full the scab of while she was a putting on a trials as today  Patient presents for 1st postop visit  Patient has no complaints      Current Medications  Current Outpatient Medications   Medication Sig Dispense Refill    albuterol (PROVENTIL HFA,VENTOLIN HFA) 90 mcg/act inhaler Inhale 2 puffs every 6 (six) hours as needed for wheezing      citalopram (CeleXA) 40 mg tablet Take 40 mg by mouth daily      fluticasone (FLOVENT HFA) 110 MCG/ACT inhaler Inhale 2 puffs 2 (two) times a day Rinse mouth after use   levothyroxine 100 mcg tablet Take 100 mcg by mouth daily      norgestimate-ethinyl estradiol (ORTHO-CYCLEN) 0 25-35 MG-MCG per tablet Take 1 tablet by mouth daily      QUEtiapine (SEROquel) 100 mg tablet Take 100 mg by mouth daily at bedtime      ibuprofen (MOTRIN) 800 mg tablet Take 1 tablet (800 mg total) by mouth every 8 (eight) hours for 5 days 15 tablet 0     No current facility-administered medications for this visit          Current Problems    Active Problems:   Patient Active Problem List    Diagnosis Date Noted    Painful scar 11/06/2019    Closed dislocation of right patella 09/04/2019         Review of Systems:    General: negative for - chills, fatigue, fever,  weight gain or weight loss  Psychological: negative for - anxiety, behavioral disorder, concentration difficulties  Ophthalmic: negative for - blurry vision, decreased vision, double vision,      Past Medical History:   Past Medical History:   Diagnosis Date    Asthma     Bipolar 1 disorder ([facility] [address] )     Depression     Disease of thyroid gland     Nerve disorder     Pet allergy     Seasonal allergies        Past Surgical History:   Past Surgical History: Procedure Laterality Date    BACK SURGERY      KNEE ARTHROSCOPY Right     KNEE ARTHROSCOPY W/ ACL RECONSTRUCTION      IA KNEE SCOPE,FULL SYNOVECT Right 9/16/2019    Procedure: ARTHROSCOPY KNEE  arthroscopy of right knee 1st, mini open right MPFL reconstruction with allograft;  Surgeon: Iam Toussaint MD;  Location: [facility];  Service: Orthopedics    REVISION OF SCAR Right 11/18/2019    Procedure: REVISION SCAR EXTREMITY;  Surgeon: Iam Toussaint MD;  Location: [facility];  Service: Orthopedics    WISDOM TOOTH EXTRACTION         Family History:  Family history reviewed and non-contributory  Family History   Problem Relation Age of Onset    Pneumonia Mother     Multiple sclerosis Father        Social History:  Social History     Socioeconomic History    Marital status: Single     Spouse name: None    Number of children: None    Years of education: None    Highest education level: None   Occupational History    None   Social Needs    Financial resource strain: None    Food insecurity:     Worry: None     Inability: None    Transportation needs:     Medical: None     Non-medical: None   Tobacco Use    Smoking status: Current Every Day Smoker     Types: E-Cigarettes    Smokeless tobacco: Current User    Tobacco comment: vapes   Substance and Sexual Activity    Alcohol use: Not Currently    Drug use: Yes     Types: Marijuana     Comment: [facility] m[facility]    Sexual activity: None   Lifestyle    Physical activity:     Days per week: None     Minutes per session: None    Stress: None   Relationships    Social connections:     Talks on phone: None     Gets together: None     Attends [employer] service: None     Active member of club or organization: None     Attends meetings of clubs or organizations: None     Relationship status: None    Intimate partner violence:     Fear of current or ex partner: None     Emotionally abused: None     Physically abused: None     Forced sexual activity: None   Other Topics Concern    None   Social History Narrative    None       Allergies:   No Known Allergies        Physical ExaminationBP 105/71   Pulse (!) 106   Ht 5' 4" (1 626 m)   Wt 87 1 kg (192 lb)   LMP 11/13/2019 (Approximate)   BMI 32 96 kg/m²   Gen: Alert and oriented to person, place, time  HEENT: EOMI, eyes clear, moist mucus membranes, hearing intact      Orthopedic Exam   right medial knee   no evidence of infection slight redness around the incisional site with a small exposed raw area where the scab was peeled off   no distal deficits   calf is soft non-tender   patella tracks well   pathology is in keeping with the scar with the pigmentation no evidence of malignancy          Impression   stable status post scar revision MPFL reconstruction        Plan     patient can resume physical therapy   follow-up in 2 weeks    Rubens Arreaga MD        Portions of the record may have been created with voice recognition software  Occasional wrong word or "sound a like" substitutions may have occurred due to the inherent limitations of voice recognition software  Read the chart carefully and recognize, using context, where substitutions have occurred

## 2019-12-03 NOTE — PROGRESS NOTES
PT Re-Evaluation   Today's date: 11/15/2019    Patient name: Avery Walker  : 1995  MRN: [id number]  Referring provider: Eileen Barrientos MD  Dx:   Encounter Diagnosis     ICD-10-CM    1  Aftercare following surgery Z48 89    2  Difficulty walking R26 2    3  Acute pain of right knee M25 561      Assessment  Assessment details: Patient states she has a lot of pain with her knee region currently  Impairments: abnormal gait, abnormal or restricted ROM, abnormal movement, activity intolerance, impaired balance, pain with function, safety issue and weight-bearing intolerance  Understanding of Dx/Px/POC: excellent   Prognosis: good    Goals  STG 2-4 weeks:    Increase B LE strength 2-5 lbs  Decrease pain by 1-2 levels on 1-10 pain scale  Increase standing/walking tolerance to >30 minutes  Patient independent with HEP  LTG 6-8 weeks:   Increase B LE strength 10-20 lbs  Decrease pain to 0-2/10 with activity  Increase single leg stance >30 seconds  Increase standing/walking tolerance to >90 minutes  Increase range of motion to normal   Improve gait pattern, coordination and balance to normal   D/C with HEP  Plan  Plan details: All planned modality interventions and planned therapy interventions are provided PRN      Patient would benefit from: PT eval and skilled physical therapy  Planned modality interventions: ultrasound and unattended electrical stimulation  Planned therapy interventions: joint mobilization, manual therapy, balance, balance/weight bearing training, neuromuscular re-education, patient education, postural training, self care, strengthening, stretching, therapeutic activities, therapeutic exercise, therapeutic training, transfer training, home exercise program, graded exercise, gait training, flexibility and coordination  Frequency: 3x week  Duration in weeks: 6  Treatment plan discussed with: patient      Subjective Evaluation    Pain  Quality: discomfort, pulling, squeezing, sharp, radiating and tight  Aggravating factors: running, lifting, stair climbing, walking, standing and sitting  Progression: improved    Treatments  Previous treatment: physical therapy  Current treatment: physical therapy  Patient Goals  Patient goals for therapy: decreased pain, improved balance, increased motion, return to work, return to [employer] activities, independence with ADLs/IADLs and increased strength        Objective     Date of onset:  9/16/2019    Date of Surgery:  9/16/2019    History of Present Episode: 10/7/2019  Deepak Prince states she has had issues with her right knee for several years  She finally went for surgery since she fell recently on her right knee and was in severe pain  Past Medical History:    10/7/2019  Deepak Prince reports right knee issues since age 15  Asthma  Thyroid issues  Previous Level of Functional Ability:  10/7/2019  Deepakmarty Prince states her right knee was getting worse and worse to walk at times  Inspection / Palpation:  Knee:  10/7/2019  Mesomorphic / Endomorphic body type  Moderate to severe signs of infection  No signs of wounds  No signs of drainage  Mild to moderate signs of ecchymotic regions  Mild to moderate signs of erythremic regions  Moderate signs of muscle spasm  Moderate signs of muscle guarding  Moderate signs of tenderness reported to palpation  Moderate signs of swelling  Positive signs of a surgery site  Current conditions appears consistent with recent acute surgery recovery episode  Chief Complaints:  10/7/2019  Deepak Prince reports moderate to severe difficulty with standing  Deepak Prince reports moderate to severe difficulty with walking  Deepak Prince reports moderate to severe difficulty with movement / use of her right knee  Deepak Prince reports severe difficulty with use of stairs  Deepak Prince reports severe difficulty with running  Deepak Prince reports severe difficulty with jumping  Deepak Prince reports moderate to severe difficulty with use of inclines    Deepak Prince reports moderate difficulty with sleeping  Marialuisa Segovia reports moderate difficulty with her strength and endurance  Marialuisa Segovia reports moderate to severe limitations with her range of motion  Marialuisa Segovia reports moderate to severe difficulty lying on her right knee region      KNEE PAIN Resting Moving Palpation Standing Walking   10/7/2019 Ltt 0 0 0 0 0   10/7/2019 Rt 0-3 2-6 4-7 3-7 3-7   11/13/2019 Rt 0-2 1-4 2-5 2-5 2-5   11/15/2019 Rt 0-2 1-4 2-5 2-5 2-5     KNEE PAIN Running Stairs Sleeping Twisting Jumping   10/7/2019 Lt 0 0 0 0 0   10/7/2019 Rt NA NA 3-6 4-7 NA   11/13/2019 Rt NA NA 1-4 2-5 NA   11/15/99621 Rt NA NA 1-4 2-5 NA     KNEE AROM Flexion Extension SLR   10/7/2019 Lt 140° 0° 95°   10/7/2019 Rt 65° 0° 74°   11/13/2019 Rt 95° 0° 82°   11/15/2019 Rt 95° 0° 82°     KNEE MMT Flexion Extension Varus Stress Valgus Stress   10/7/2019 Lt 0/10  35 lbs 0/10  33 lbs 0/10  29 lbs 0/10  30 lbs   10/7/2019 Rt 5/10  7 lbs 5/10  6 lbs 5/10  5 lbs 6/10  6 lbs   11/13/2019 Rt 4/10  12 lbs 4/10  11 lbs 4/10  10 lbs 4/10  11 lbs   11/15/2019 Rt 4/10  12 lbs 4/10  11 lbs 4/10  10 lbs 4/10  11 lbs     Precautions: Right Knee Surgery

## 2019-12-04 ENCOUNTER — OFFICE VISIT (OUTPATIENT)
Dept: PHYSICAL THERAPY | Facility: CLINIC | Age: 24
End: 2019-12-04
Payer: COMMERCIAL

## 2019-12-04 DIAGNOSIS — Z48.89 AFTERCARE FOLLOWING SURGERY: Primary | ICD-10-CM

## 2019-12-04 DIAGNOSIS — R26.2 DIFFICULTY WALKING: ICD-10-CM

## 2019-12-04 DIAGNOSIS — M25.561 ACUTE PAIN OF RIGHT KNEE: ICD-10-CM

## 2019-12-04 PROCEDURE — 97110 THERAPEUTIC EXERCISES: CPT | Performed by: PHYSICAL THERAPIST

## 2019-12-04 PROCEDURE — 97162 PT EVAL MOD COMPLEX 30 MIN: CPT | Performed by: PHYSICAL THERAPIST

## 2019-12-04 PROCEDURE — 97112 NEUROMUSCULAR REEDUCATION: CPT | Performed by: PHYSICAL THERAPIST

## 2019-12-04 PROCEDURE — 97140 MANUAL THERAPY 1/> REGIONS: CPT | Performed by: PHYSICAL THERAPIST

## 2019-12-04 NOTE — PROGRESS NOTES
PT Evaluation     Today's date: 2019  Patient name: Bryce Sue  : 1995  MRN: [id number]  Referring provider: Ced Reddy MD  Dx:   Encounter Diagnosis     ICD-10-CM    1  Aftercare following surgery Z48 89    2  Difficulty walking R26 2    3  Acute pain of right knee M25 561      Subjective:  Rivas Zimmerman states her right knee feels better after her second surgery  Objective: See treatment log below    Assessment: Tolerated treatment well  Patient exhibited good technique with therapeutic exercises and would benefit from continued PT    Plan: Continue per plan of care  Progress treatment as tolerated  Today's date: 2019    Patient name: Bryce Sue  : 1995  MRN: [id number]  Referring provider: Ced Reddy MD  Dx:   Encounter Diagnosis     ICD-10-CM    1  Aftercare following surgery Z48 89 PT plan of care cert/re-cert   2  Difficulty walking R26 2 PT plan of care cert/re-cert   3  Acute pain of right knee M25 561 PT plan of care cert/re-cert     Assessment  Assessment details: Patient received a second surgery on her knee region  Impairments: abnormal gait, abnormal or restricted ROM, abnormal movement, activity intolerance, impaired balance, pain with function and safety issue  Understanding of Dx/Px/POC: excellent  Goals  STG 2-4 weeks:    Increase B LE strength 2-5 lbs  Decrease pain by 1-2 levels on 1-10 pain scale  Increase standing/walking tolerance to >30 minutes  Patient independent with HEP  LTG 6-8 weeks:   Increase B LE strength 10-20 lbs  Decrease pain to 0-2/10 with activity  Increase single leg stance >30 seconds  Increase standing/walking tolerance to >90 minutes  Increase range of motion to normal   Improve gait pattern, coordination and balance to normal   D/C with HEP  Plan  Plan details: All planned modality interventions and planned therapy interventions are provided PRN      Patient would benefit from: PT eval and skilled physical therapy  Planned modality interventions: ultrasound and unattended electrical stimulation  Planned therapy interventions: joint mobilization, manual therapy, balance, balance/weight bearing training, neuromuscular re-education, patient education, postural training, self care, strengthening, stretching, therapeutic activities, therapeutic exercise, therapeutic training, transfer training, home exercise program, graded exercise, gait training, flexibility and coordination  Frequency: 3x week  Duration in weeks: 6  Treatment plan discussed with: patient      Subjective Evaluation    Pain  Quality: knife-like, pulling, squeezing, discomfort, sharp and tight  Relieving factors: relaxation, rest and change in position  Aggravating factors: running, stair climbing, walking and standing  Progression: improved    Treatments  Previous treatment: physical therapy  Current treatment: physical therapy  Patient Goals  Patient goals for therapy: decreased pain, improved balance, increased motion, return to work, return to [employer] activities, independence with ADLs/IADLs and increased strength        Objective    Date of onset:  9/16/2019 & 11/18/2019  Date of Surgery:  9/16/2019 & 11/18/2019    History of Present Episode: 12/4/2019  Anne Rebollar states she received her second right knee surgery on 11/18/2019 to remove foreign objects from her current surgery site  She states she feels much better now  10/7/2019  Anne Rebollar states she has had issues with her right knee for several years  She finally went for surgery since she fell recently on her right knee and was in severe pain  Past Medical History:    10/7/2019  Anne Rebollar reports right knee issues since age 15  Asthma  Thyroid issues  Previous Level of Functional Ability:  10/7/2019  Anne Rebollar states her right knee was getting worse and worse to walk at times        Inspection / Palpation:  Knee:  12/4/2019 Noted evidence of new surgery site on the medial aspect of her surgery site  10/7/2019  Mesomorphic / Endomorphic body type  Moderate to severe signs of infection  No signs of wounds  No signs of drainage  Mild to moderate signs of ecchymotic regions  Mild to moderate signs of erythremic regions  Moderate signs of muscle spasm  Moderate signs of muscle guarding  Moderate signs of tenderness reported to palpation  Moderate signs of swelling  Positive signs of a surgery site  Current conditions appears consistent with recent acute surgery recovery episode  Chief Complaints:  10/7/2019  Jhoan Vivas reports moderate to severe difficulty with standing  Jhoan Vivas reports moderate to severe difficulty with walking  Jhoan Vivas reports moderate to severe difficulty with movement / use of her right knee  Jhoan Vivas reports severe difficulty with use of stairs  Jhoan Vivas reports severe difficulty with running  Jhoan Vivas reports severe difficulty with jumping  Jhoan Vivas reports moderate to severe difficulty with use of inclines  Jhoan Vivas reports moderate difficulty with sleeping  Jhaon Vivas reports moderate difficulty with her strength and endurance  Jhoan Vivas reports moderate to severe limitations with her range of motion  Jhoan Vivas reports moderate to severe difficulty lying on her right knee region      KNEE PAIN Resting Moving Palpation Standing Walking   10/7/2019 Lt 0 0 0 0 0   10/7/2019 Rt 0-3 2-6 4-7 3-7 3-7   11/13/2019 Rt 0-2 1-4 2-5 2-5 2-5   12/4/2019 Rt 1-3 2-5 2-5 2-5 2-5     KNEE PAIN Running Stairs Sleeping Twisting Jumping   10/7/2019 Lt 0 0 0 0 0   10/7/2019 Rt NA NA 3-6 4-7 NA   11/13/2019 Rt NA NA 1-4 2-5 NA   12/4/2019 Rt NA NA 2-5 3-6 NA     KNEE AROM Flexion Extension SLR   10/7/2019 Lt 140° 0° 95°   10/7/2019 Rt 65° 0° 74°   11/13/2019 Rt 95° 0° 82°   12/4/2019 Rt 123° 0° 93°     KNEE MMT Flexion Extension Varus Stress Valgus Stress   10/7/2019 Lt 0/10  35 lbs 0/10  33 lbs 0/10  29 lbs 0/10  30 lbs   10/7/2019 Rt 5/10  7 lbs 5/10  6 lbs 5/10  5 lbs 6/10  6 lbs   11/13/2019 Rt 4/10  12 lbs 4/10  11 lbs 4/10  10 lbs 4/10  11 lbs   12/4/2019 Rt 4/10  15 lbs 4/10 11 lbs 4/10  12 lbs 4/10  13 lbs     Precautions: Right Knee Surgery    Daily Treatment Log  Manual  11/15 12/4      MT, ROM 15' 15'      HEP        Exercise Log 11/15 12/4      Balance Board 30x 30x      Chair Squats        P-Bar-GT-Forward, Backward,Side-Even & Dips 12x 12x      BOSU-Walk 5' 5'      Foam Pad SLR,Hip/KneeFl,Step Ups        Foam Beam        Fitter-Slalom        Monster Steps        BAPS- L-2        'x2 120'x2      T/G-Squats PF 30x 30x      W/P-Hip-Abd,Add,Flex,Ext 5#-30x 5#-30x      WP-Squats 5#-30x 5#-30x      Trimax-TKE        [id number]-DH-[id number]        NK Table Exer 5#-30x 5#-30x      NK Table ROM 5#-30x 5#-30x      TM        Stepper        Bike 10' 10'      ME, PE 15' 15'              Modalities 11/15 12/4      MH&ES        US

## 2019-12-06 ENCOUNTER — OFFICE VISIT (OUTPATIENT)
Dept: PHYSICAL THERAPY | Facility: CLINIC | Age: 24
End: 2019-12-06
Payer: COMMERCIAL

## 2019-12-06 DIAGNOSIS — R26.2 DIFFICULTY WALKING: ICD-10-CM

## 2019-12-06 DIAGNOSIS — Z48.89 AFTERCARE FOLLOWING SURGERY: Primary | ICD-10-CM

## 2019-12-06 DIAGNOSIS — M25.561 ACUTE PAIN OF RIGHT KNEE: ICD-10-CM

## 2019-12-06 PROCEDURE — 97014 ELECTRIC STIMULATION THERAPY: CPT | Performed by: PHYSICAL THERAPIST

## 2019-12-06 PROCEDURE — 97110 THERAPEUTIC EXERCISES: CPT | Performed by: PHYSICAL THERAPIST

## 2019-12-06 PROCEDURE — 97112 NEUROMUSCULAR REEDUCATION: CPT | Performed by: PHYSICAL THERAPIST

## 2019-12-06 PROCEDURE — G0283 ELEC STIM OTHER THAN WOUND: HCPCS | Performed by: PHYSICAL THERAPIST

## 2019-12-06 PROCEDURE — 97140 MANUAL THERAPY 1/> REGIONS: CPT | Performed by: PHYSICAL THERAPIST

## 2019-12-06 NOTE — PROGRESS NOTES
Today's date: 2019  Patient name: Malcolm Sinclair  : 1995  MRN: [id number]  Referring provider: Viral Winchester MD  Dx:   Encounter Diagnosis     ICD-10-CM    1  Aftercare following surgery Z48 89    2  Difficulty walking R26 2    3  Acute pain of right knee M25 561      Subjective:  Terry Herrera states her right knee is feeling better  Objective: See treatment log below    Assessment: Tolerated treatment well  Patient exhibited good technique with therapeutic exercises and would benefit from continued PT    Plan: Continue per plan of care  Progress treatment as tolerated         Precautions: Right Knee Surgery    Daily Treatment Log  Manual  11/15 12/4 12/6     MT, ROM 15' 15' 15'     HEP        Exercise Log 11/15 12/4 12/6     Balance Board 30x 30x 30x     Chair Squats        P-Bar-GT-Forward, Backward,Side-Even & Dips 12x 12x 12x     BOSU-Walk 5' 5' 5'     Foam Pad SLR,Hip/KneeFl,Step Ups        Foam Beam        Fitter-Slalom        Monster Steps        BAPS- L-2        'x2 120'x2 120'x2     T/G-Squats PF 30x 30x 30x     W/P-Hip-Abd,Add,Flex,Ext 5#-30x 5#-30x 5#-30x     WP-Squats 5#-30x 5#-30x 5#-30x     Trimax-TKE        Uvsqmex-[id number]-[id number]        NK Table Exer 5#-30x 5#-30x 5#-30x     NK Table ROM 5#-30x 5#-30x 5#-30x     TM        Stepper        Bike 10' 10' 10'     ME, PE 15' 15' 15'             Modalities 11/15 12/4 12/6     MH&ES   20'     US

## 2019-12-09 ENCOUNTER — OFFICE VISIT (OUTPATIENT)
Dept: PHYSICAL THERAPY | Facility: CLINIC | Age: 24
End: 2019-12-09
Payer: COMMERCIAL

## 2019-12-09 DIAGNOSIS — M25.561 ACUTE PAIN OF RIGHT KNEE: ICD-10-CM

## 2019-12-09 DIAGNOSIS — R26.2 DIFFICULTY WALKING: ICD-10-CM

## 2019-12-09 DIAGNOSIS — Z48.89 AFTERCARE FOLLOWING SURGERY: Primary | ICD-10-CM

## 2019-12-09 PROCEDURE — 97112 NEUROMUSCULAR REEDUCATION: CPT | Performed by: PHYSICAL THERAPIST

## 2019-12-09 PROCEDURE — 97140 MANUAL THERAPY 1/> REGIONS: CPT | Performed by: PHYSICAL THERAPIST

## 2019-12-09 PROCEDURE — 97110 THERAPEUTIC EXERCISES: CPT | Performed by: PHYSICAL THERAPIST

## 2019-12-09 NOTE — PROGRESS NOTES
Today's date: 2019  Patient name: Ely Martin  : 1995  MRN: [id number]  Referring provider: Eddie Hobson MD  Dx:   Encounter Diagnosis     ICD-10-CM    1  Aftercare following surgery Z48 89    2  Difficulty walking R26 2    3  Acute pain of right knee M25 561      Subjective:  Mauri Stone states her knee is sore today plus her back is bothering her today  She is real sore with her back plus her back is real stiff  Objective: See treatment log below    Assessment: Tolerated treatment well  Patient exhibited good technique with therapeutic exercises and would benefit from continued PT    Plan: Continue per plan of care  Progress treatment as tolerated         Precautions: Right Knee Surgery    Daily Treatment Log  Manual  11/15 12/4 12/6 12/9    MT, ROM 15' 15' 15' 15'    HEP        Exercise Log 11/15 12/4 12/6 12/9    Balance Board 30x 30x 30x 30x    Chair Squats        P-Bar-GT-Forward, Backward,Side-Even & Dips 12x 12x 12x 12x    BOSU-Walk 5' 5' 5' 5'    Foam Pad SLR,Hip/KneeFl,Step Ups        Foam Beam        Fitter-Slalom        Monster Steps        BAPS- L-2        'x2 120'x2 120'x2 120'x2    T/G-Squats PF 30x 30x 30x 30x    W/P-Hip-Abd,Add,Flex,Ext 5#-30x 5#-30x 5#-30x 5#-30x    WP-Squats 5#-30x 5#-30x 5#-30x 5#-30x    Trimax-TKE        Pnnjsqn-UT-Sg        NK Table Exer 5#-30x 5#-30x 5#-30x 5#-30x    NK Table ROM 5#-30x 5#-30x 5#-30x 5#-30x    TM        Stepper        Bike 10' 10' 10' 10'    ME, PE 15' 15' 15' 15'            Modalities 11/15 12/4 12/6 12/9    MH&ES   20'     US

## 2019-12-11 ENCOUNTER — OFFICE VISIT (OUTPATIENT)
Dept: OBGYN CLINIC | Facility: CLINIC | Age: 24
End: 2019-12-11

## 2019-12-11 VITALS
SYSTOLIC BLOOD PRESSURE: 101 MMHG | DIASTOLIC BLOOD PRESSURE: 70 MMHG | BODY MASS INDEX: 32.61 KG/M2 | HEIGHT: 64 IN | HEART RATE: 112 BPM | WEIGHT: 191 LBS

## 2019-12-11 DIAGNOSIS — Z09 POSTOP CHECK: Primary | ICD-10-CM

## 2019-12-11 PROCEDURE — 99024 POSTOP FOLLOW-UP VISIT: CPT | Performed by: ORTHOPAEDIC SURGERY

## 2019-12-11 NOTE — PROGRESS NOTES
Chief Complaint   status post excision of a scar right knee    History Of Presenting Illness  Mandy Robert 1995 presents with  Right knee scar revision status post right MPFL reconstruction  Patient presents for evaluation today  Patient had the scar revision excision on November 18th, 2019 due to tatooing of the scar most likely due to metallic filings  Right knee feels stable now  Patient has a pain-free range of motion  Patient has no further episodes of patellar dislocation or subluxation      Current Medications  Current Outpatient Medications   Medication Sig Dispense Refill    albuterol (PROVENTIL HFA,VENTOLIN HFA) 90 mcg/act inhaler Inhale 2 puffs every 6 (six) hours as needed for wheezing      citalopram (CeleXA) 40 mg tablet Take 40 mg by mouth daily      fluticasone (FLOVENT HFA) 110 MCG/ACT inhaler Inhale 2 puffs 2 (two) times a day Rinse mouth after use   ibuprofen (MOTRIN) 800 mg tablet Take 1 tablet (800 mg total) by mouth every 8 (eight) hours for 5 days 15 tablet 0    levothyroxine 100 mcg tablet Take 100 mcg by mouth daily      norgestimate-ethinyl estradiol (ORTHO-CYCLEN) 0 25-35 MG-MCG per tablet Take 1 tablet by mouth daily      QUEtiapine (SEROquel) 100 mg tablet Take 100 mg by mouth daily at bedtime       No current facility-administered medications for this visit          Current Problems    Active Problems:   Patient Active Problem List    Diagnosis Date Noted    Painful scar 11/06/2019    Closed dislocation of right patella 09/04/2019         Review of Systems:    General: negative for - chills, fatigue, fever,  weight gain or weight loss  Psychological: negative for - anxiety, behavioral disorder, concentration difficulties  Ophthalmic: negative for - blurry vision, decreased vision, double vision,      Past Medical History:   Past Medical History:   Diagnosis Date    Asthma     Bipolar 1 disorder ([facility] [address] )     Depression     Disease of thyroid gland     Nerve disorder     Pet allergy     Seasonal allergies        Past Surgical History:   Past Surgical History:   Procedure Laterality Date    BACK SURGERY      KNEE ARTHROSCOPY Right     KNEE ARTHROSCOPY W/ ACL RECONSTRUCTION      MT KNEE SCOPE,FULL SYNOVECT Right 9/16/2019    Procedure: ARTHROSCOPY KNEE  arthroscopy of right knee 1st, mini open right MPFL reconstruction with allograft;  Surgeon: Lory Christina MD;  Location: [facility];  Service: Orthopedics    REVISION OF SCAR Right 11/18/2019    Procedure: REVISION SCAR EXTREMITY;  Surgeon: Lory Christina MD;  Location: [facility];  Service: Orthopedics    WISDOM TOOTH EXTRACTION         Family History:  Family history reviewed and non-contributory  Family History   Problem Relation Age of Onset    Pneumonia Mother     Multiple sclerosis Father        Social History:  Social History     Socioeconomic History    Marital status: Single     Spouse name: None    Number of children: None    Years of education: None    Highest education level: None   Occupational History    None   Social Needs    Financial resource strain: None    Food insecurity:     Worry: None     Inability: None    Transportation needs:     Medical: None     Non-medical: None   Tobacco Use    Smoking status: Current Every Day Smoker     Types: E-Cigarettes    Smokeless tobacco: Current User    Tobacco comment: vapes   Substance and Sexual Activity    Alcohol use: Not Currently    Drug use: Yes     Types: Marijuana     Comment: has not used for 3 m,[facility]    Sexual activity: None   Lifestyle    Physical activity:     Days per week: None     Minutes per session: None    Stress: None   Relationships    Social connections:     Talks on phone: None     Gets together: None     Attends Jew service: None     Active member of club or organization: None     Attends meetings of clubs or organizations: None     Relationship status: None    Intimate partner violence:     Fear of current or ex partner: None     Emotionally abused: None     Physically abused: None     Forced sexual activity: None   Other Topics Concern    None   Social History Narrative    None       Allergies:   No Known Allergies        Physical ExaminationBP 101/70   Pulse (!) 112   Ht 5' 4" (1 626 m)   Wt 86 6 kg (191 lb)   LMP 11/13/2019 (Approximate)   BMI 32 79 kg/m²   Gen: Alert and oriented to person, place, time  HEENT: EOMI, eyes clear, moist mucus membranes, hearing intact      Orthopedic Exam   right knee incisions healed slight eschar formation present no evidence of infection   patella tracks well feels stable apprehension negative          Impression   stable status post right MPFL reconstruction with scar revision      Plan     patient allowed all activities as tolerated    Patient will work on smoking cessation, weight loss, home exercise program and physical therapy   follow-up in 3 months x-ray right knee AP lateral skyline on arrival    Librado Meza MD        Portions of the record may have been created with voice recognition software  Occasional wrong word or "sound a like" substitutions may have occurred due to the inherent limitations of voice recognition software  Read the chart carefully and recognize, using context, where substitutions have occurred

## 2019-12-13 ENCOUNTER — APPOINTMENT (OUTPATIENT)
Dept: PHYSICAL THERAPY | Facility: CLINIC | Age: 24
End: 2019-12-13
Payer: COMMERCIAL

## 2019-12-16 ENCOUNTER — OFFICE VISIT (OUTPATIENT)
Dept: PHYSICAL THERAPY | Facility: CLINIC | Age: 24
End: 2019-12-16
Payer: COMMERCIAL

## 2019-12-16 DIAGNOSIS — Z48.89 AFTERCARE FOLLOWING SURGERY: Primary | ICD-10-CM

## 2019-12-16 DIAGNOSIS — M25.561 ACUTE PAIN OF RIGHT KNEE: ICD-10-CM

## 2019-12-16 DIAGNOSIS — R26.2 DIFFICULTY WALKING: ICD-10-CM

## 2019-12-16 PROCEDURE — 97110 THERAPEUTIC EXERCISES: CPT | Performed by: PHYSICAL THERAPIST

## 2019-12-16 PROCEDURE — 97014 ELECTRIC STIMULATION THERAPY: CPT | Performed by: PHYSICAL THERAPIST

## 2019-12-16 PROCEDURE — 97112 NEUROMUSCULAR REEDUCATION: CPT | Performed by: PHYSICAL THERAPIST

## 2019-12-16 PROCEDURE — G0283 ELEC STIM OTHER THAN WOUND: HCPCS | Performed by: PHYSICAL THERAPIST

## 2019-12-16 PROCEDURE — 97164 PT RE-EVAL EST PLAN CARE: CPT | Performed by: PHYSICAL THERAPIST

## 2019-12-16 PROCEDURE — 97140 MANUAL THERAPY 1/> REGIONS: CPT | Performed by: PHYSICAL THERAPIST

## 2019-12-16 NOTE — PROGRESS NOTES
Today's date: 2019  Patient name: Anne Estrada  : 1995  MRN: [id number]  Referring provider: Avery Lopez MD  Dx:   Encounter Diagnosis     ICD-10-CM    1  Aftercare following surgery Z48 89    2  Difficulty walking R26 2    3  Acute pain of right knee M25 561      Subjective:  Becky Gregorio states her right knee is feeling better  Objective: See treatment log below    Assessment: Tolerated treatment well  Patient exhibited good technique with therapeutic exercises and would benefit from continued PT    Plan: Continue per plan of care  Progress treatment as tolerated         Precautions: Right Knee Surgery    Daily Treatment Log  Manual  11/15 12/4 12/6 12/9 12/16   MT, ROM 15' 15' 15' 15' 15'   HEP        Exercise Log 11/15 12/4 12/6 12/9 12/16   Balance Board 30x 30x 30x 30x 30x   Chair Squats        P-Bar-GT-Forward, Backward,Side-Even & Dips 12x 12x 12x 12x 12x   BOSU-Walk 5' 5' 5' 5' 5'   Foam Pad SLR,Hip/KneeFl,Step Ups        Foam Beam        Fitter-Slalom        Monster Steps        BAPS- L-2        'x2 120'x2 120'x2 120'x2 120'x2   T/G-Squats PF 30x 30x 30x 30x 30x   W/P-Hip-Abd,Add,Flex,Ext 5#-30x 5#-30x 5#-30x 5#-30x 10#-30x   WP-Squats 5#-30x 5#-30x 5#-30x 5#-30x 5#-30x   Trimax-TKE        Eoskswc-DG-La        NK Table Exer 5#-30x 5#-30x 5#-30x 5#-30x 5#-30x   NK Table ROM 5#-30x 5#-30x 5#-30x 5#-30x 5x'10'   TM        Stepper        Bike 10' 10' 10' 10' 10'   ME, PE 15' 15' 15' 15' 15'           Modalities 11/15 12/4 12/6 12/9 12/16   MH&ES   20'  20'   US

## 2019-12-16 NOTE — ADDENDUM NOTE
Addended by: Shawn [facility] on: 12/16/2019 01:31 PM     Modules accepted: Orders

## 2019-12-16 NOTE — PROGRESS NOTES
PT Re-Evaluation   Today's date: 2019  Patient name: Olinda Mendez  : 1995  MRN: [id number]  Referring provider: Sumit Wilson MD  Dx:   Encounter Diagnosis     ICD-10-CM    1  Aftercare following surgery Z48 89    2  Difficulty walking R26 2    3  Acute pain of right knee M25 561      Assessment  Assessment details: Patient states her right knee is feeling a lot better  She can stand and walk better  Impairments: abnormal gait, abnormal or restricted ROM, abnormal movement, activity intolerance, pain with function, safety issue and weight-bearing intolerance  Understanding of Dx/Px/POC: excellent   Prognosis: good    Goals  STG 2-4 weeks:    Increase B LE strength 2-5 lbs  Decrease pain by 1-2 levels on 1-10 pain scale  Increase standing/walking tolerance to >30 minutes  Patient independent with HEP  LTG 6-8 weeks:   Increase B LE strength 10-20 lbs  Decrease pain to 0-2/10 with activity  Increase single leg stance >30 seconds  Increase standing/walking tolerance to >90 minutes  Increase range of motion to normal   Improve gait pattern, coordination and balance to normal   D/C with HEP  Plan  Plan details: All planned modality interventions and planned therapy interventions are provided PRN    Patient would benefit from: PT eval and skilled physical therapy  Planned modality interventions: ultrasound and unattended electrical stimulation  Planned therapy interventions: joint mobilization, manual therapy, neuromuscular re-education, patient education, postural training, self care, strengthening, stretching, therapeutic activities, therapeutic exercise, therapeutic training, transfer training, coordination, balance, balance/weight bearing training, gait training, graded exercise and home exercise program  Frequency: 3x week  Duration in weeks: 6  Treatment plan discussed with: patient      Subjective Evaluation    Pain  Quality: discomfort, knife-like, pulling, sharp, tight and throbbing  Relieving factors: change in position, relaxation, rest and support  Aggravating factors: lifting, running, overhead activity, stair climbing, walking and standing  Progression: improved    Treatments  Previous treatment: physical therapy  Current treatment: physical therapy  Patient Goals  Patient goals for therapy: decreased pain, improved balance, increased motion, return to work, return to [employer] activities, independence with ADLs/IADLs and increased strength        Objective     Date of onset:  9/16/2019    Date of Surgery:  9/16/2019    History of Present Episode: 10/7/2019  Rene Rocha states she has had issues with her right knee for several years  She finally went for surgery since she fell recently on her right knee and was in severe pain  Past Medical History:    10/7/2019  Rene Rocha reports right knee issues since age 15  Asthma  Thyroid issues  Previous Level of Functional Ability:  10/7/2019  Rene Rocha states her right knee was getting worse and worse to walk at times  Inspection / Palpation:  Knee:  10/7/2019  Mesomorphic / Endomorphic body type  Moderate to severe signs of infection  No signs of wounds  No signs of drainage  Mild to moderate signs of ecchymotic regions  Mild to moderate signs of erythremic regions  Moderate signs of muscle spasm  Moderate signs of muscle guarding  Moderate signs of tenderness reported to palpation  Moderate signs of swelling  Positive signs of a surgery site  Current conditions appears consistent with recent acute surgery recovery episode  Chief Complaints:  10/7/2019  Rene Rocha reports moderate to severe difficulty with standing  Rene Rocha reports moderate to severe difficulty with walking  Rene Rocha reports moderate to severe difficulty with movement / use of her right knee  Rene Rocha reports severe difficulty with use of stairs  Rene Rocha reports severe difficulty with running  Rene Rocha reports severe difficulty with jumping    Rene Rocha reports moderate to severe difficulty with use of inclines  Ekaterina Edmond reports moderate difficulty with sleeping  Ekaterina Edmond reports moderate difficulty with her strength and endurance  Ekaterina Edmond reports moderate to severe limitations with her range of motion  Ekaterina Edmond reports moderate to severe difficulty lying on her right knee region      KNEE PAIN Resting Moving Palpation Standing Walking   10/7/2019 Lt 0 0 0 0 0   10/7/2019 Rt 0-3 2-6 4-7 3-7 3-7   11/13/2019 Rt 0-2 1-4 2-5 2-5 2-5   11/15/2019 Rt 0-2 1-4 2-5 2-5 2-5   12/16/2019 Rt 0-1 1-3 1-4 1-4 1-4     KNEE PAIN Running Stairs Sleeping Twisting Jumping   10/7/2019 Lt 0 0 0 0 0   10/7/2019 Rt NA NA 3-6 4-7 NA   11/13/2019 Rt NA NA 1-4 2-5 NA   11/15/2019 Rt NA NA 1-4 2-5 NA   12/16/2019 Rt NA NA 1-3 1-4 NA     KNEE AROM Flexion Extension SLR   10/7/2019 Lt 140° 0° 95°   10/7/2019 Rt 65° 0° 74°   11/13/2019 Rt 95° 0° 82°   11/15/2019 Rt 95° 0° 82°   12/16/2109 Rt 121° 0° 88°     KNEE MMT Flexion Extension Varus Stress Valgus Stress   10/7/2019 Lt 0/10  35 lbs 0/10  33 lbs 0/10  29 lbs 0/10  30 lbs   10/7/2019 Rt 5/10  7 lbs 5/10  6 lbs 5/10  5 lbs 6/10  6 lbs   11/13/2019 Rt 4/10  12 lbs 4/10  11 lbs 4/10  10 lbs 4/10  11 lbs   11/15/2019 Rt 4/10  12 lbs 4/10  11 lbs 4/10  10 lbs 4/10  11 lbs   12/16/2019 Rt 3/10  16 lbs 2/10  17 lbs 2/10  16 lbs 2/10  15 lbs     Precautions: Right Knee Surgery

## 2019-12-16 NOTE — LETTER
2019   Signature Required / [address] ,[address] / PT Reevaluation  Evelyn Negrete MD  [address] [facility] [address]    Patient: Olinda Mendez   YOB: 1995   Date of Visit: 2019     Encounter Diagnosis     ICD-10-CM    1  Aftercare following surgery Z48 89    2  Difficulty walking R26 2    3  Acute pain of right knee M25 561      Dear Dr Fernandez Shade:  Thank you for your recent referral of Olinda Mendez  Please review the attached evaluation summary from [place] [address] recent visit  Please verify that you agree with the plan of care by signing the attached order  If you have any questions or concerns, please do not hesitate to call  I sincerely appreciate the opportunity to share in the care of one of your patients and hope to have another opportunity to work with you in the near future  Sincerely,    Yakov Lagunas, PT      Referring Provider:      I certify that I have read the below Plan of Care and certify the need for these services furnished under this plan of treatment while under my care  Evelyn Negrete MD  [address] [place] [phone]  VIA In Basket    Please SIGN ABOVE and return THIS PAGE ONLY to Fax # [phone]        Today's date: 2019  Patient name: Olinda Mendez  : 1995  MRN: [id number]  Referring provider: Sumit Wilson MD  Dx:   Encounter Diagnosis     ICD-10-CM    1  Aftercare following surgery Z48 89    2  Difficulty walking R26 2    3  Acute pain of right knee M25 561      Subjective:  Mary Beth Womack states her right knee is feeling better  Objective: See treatment log below    Assessment: Tolerated treatment well  Patient exhibited good technique with therapeutic exercises and would benefit from continued PT    Plan: Continue per plan of care  Progress treatment as tolerated         Precautions: Right Knee Surgery    Daily Treatment Log  Manual  11/15 12/4 12/6 12/9 12/16   MT, ROM 15' 15' 15' 15' 15'   HEP Exercise Log 11/15 12/4 12/6 12/9 12/16   Balance Board 30x 30x 30x 30x 30x   Chair Squats        P-Bar-GT-Forward, Backward,Side-Even & Dips 12x 12x 12x 12x 12x   BOSU-Walk 5' 5' 5' 5' 5'   Foam Pad SLR,Hip/KneeFl,Step Ups        Foam Beam        Fitter-Slalom        Monster Steps        BAPS- L-2        'x2 120'x2 120'x2 120'x2 120'x2   T/G-Squats PF 30x 30x 30x 30x 30x   W/P-Hip-Abd,Add,Flex,Ext 5#-30x 5#-30x 5#-30x 5#-30x 10#-30x   WP-Squats 5#-30x 5#-30x 5#-30x 5#-30x 5#-30x   Trimax-TKE        Aswjdhx-RZ-Oq        NK Table Exer 5#-30x 5#-30x 5#-30x 5#-30x 5#-30x   NK Table ROM 5#-30x 5#-30x 5#-30x 5#-30x 5x'10'   TM        Stepper        Bike 10' 10' 10' 10' 10'   ME, PE 15' 15' 15' 15' 15'           Modalities 11/15 12/4 12/6 12/9 12/16   MH&ES   20'  20'   US            PT Re-Evaluation   Today's date: 2019  Patient name: Lory Hu  : 1995  MRN: [id number]  Referring provider: Toribio Starkey MD  Dx:   Encounter Diagnosis     ICD-10-CM    1  Aftercare following surgery Z48 89    2  Difficulty walking R26 2    3  Acute pain of right knee M25 561      Assessment  Assessment details: Patient states her right knee is feeling a lot better  She can stand and walk better  Impairments: abnormal gait, abnormal or restricted ROM, abnormal movement, activity intolerance, pain with function, safety issue and weight-bearing intolerance  Understanding of Dx/Px/POC: excellent   Prognosis: good    Goals  STG 2-4 weeks:    Increase B LE strength 2-5 lbs  Decrease pain by 1-2 levels on 1-10 pain scale  Increase standing/walking tolerance to >30 minutes  Patient independent with HEP  LTG 6-8 weeks:   Increase B LE strength 10-20 lbs  Decrease pain to 0-2/10 with activity  Increase single leg stance >30 seconds  Increase standing/walking tolerance to >90 minutes    Increase range of motion to normal   Improve gait pattern, coordination and balance to normal   D/C with HEP       Plan  Plan details: All planned modality interventions and planned therapy interventions are provided PRN  Patient would benefit from: PT eval and skilled physical therapy  Planned modality interventions: ultrasound and unattended electrical stimulation  Planned therapy interventions: joint mobilization, manual therapy, neuromuscular re-education, patient education, postural training, self care, strengthening, stretching, therapeutic activities, therapeutic exercise, therapeutic training, transfer training, coordination, balance, balance/weight bearing training, gait training, graded exercise and home exercise program  Frequency: 3x week  Duration in weeks: 6  Treatment plan discussed with: patient      Subjective Evaluation    Pain  Quality: discomfort, knife-like, pulling, sharp, tight and throbbing  Relieving factors: change in position, relaxation, rest and support  Aggravating factors: lifting, running, overhead activity, stair climbing, walking and standing  Progression: improved    Treatments  Previous treatment: physical therapy  Current treatment: physical therapy  Patient Goals  Patient goals for therapy: decreased pain, improved balance, increased motion, return to work, return to [employer] activities, independence with ADLs/IADLs and increased strength        Objective     Date of onset:  9/16/2019    Date of Surgery:  9/16/2019    History of Present Episode: 10/7/2019  Janece Page states she has had issues with her right knee for several years  She finally went for surgery since she fell recently on her right knee and was in severe pain  Past Medical History:    10/7/2019  Janece Page reports right knee issues since age 15  Asthma  Thyroid issues  Previous Level of Functional Ability:  10/7/2019  Janece Page states her right knee was getting worse and worse to walk at times  Inspection / Palpation:  Knee:  10/7/2019  Mesomorphic / Endomorphic body type    Moderate to severe signs of infection  No signs of wounds  No signs of drainage  Mild to moderate signs of ecchymotic regions  Mild to moderate signs of erythremic regions  Moderate signs of muscle spasm  Moderate signs of muscle guarding  Moderate signs of tenderness reported to palpation  Moderate signs of swelling  Positive signs of a surgery site  Current conditions appears consistent with recent acute surgery recovery episode  Chief Complaints:  10/7/2019  Shital Fraire reports moderate to severe difficulty with standing  Shital Fraire reports moderate to severe difficulty with walking  Shital Fraire reports moderate to severe difficulty with movement / use of her right knee  Shital Fraire reports severe difficulty with use of stairs  Shital Fraire reports severe difficulty with running  Shital Fraire reports severe difficulty with jumping  Shital Fraire reports moderate to severe difficulty with use of inclines  Shital Fraire reports moderate difficulty with sleeping  Shital Fraire reports moderate difficulty with her strength and endurance  Shital Fraire reports moderate to severe limitations with her range of motion  Shital Fraire reports moderate to severe difficulty lying on her right knee region      KNEE PAIN Resting Moving Palpation Standing Walking   10/7/2019 Lt 0 0 0 0 0   10/7/2019 Rt 0-3 2-6 4-7 3-7 3-7   11/13/2019 Rt 0-2 1-4 2-5 2-5 2-5   11/15/2019 Rt 0-2 1-4 2-5 2-5 2-5   12/16/2019 Rt 0-1 1-3 1-4 1-4 1-4     KNEE PAIN Running Stairs Sleeping Twisting Jumping   10/7/2019 Lt 0 0 0 0 0   10/7/2019 Rt NA NA 3-6 4-7 NA   11/13/2019 Rt NA NA 1-4 2-5 NA   11/15/2019 Rt NA NA 1-4 2-5 NA   12/16/2019 Rt NA NA 1-3 1-4 NA     KNEE AROM Flexion Extension SLR   10/7/2019 Lt 140° 0° 95°   10/7/2019 Rt 65° 0° 74°   11/13/2019 Rt 95° 0° 82°   11/15/2019 Rt 95° 0° 82°   12/16/2109 Rt 121° 0° 88°     KNEE MMT Flexion Extension Varus Stress Valgus Stress   10/7/2019 Lt 0/10  35 lbs 0/10  33 lbs 0/10  29 lbs 0/10  30 lbs   10/7/2019 Rt 5/10  7 lbs 5/10  6 lbs 5/10  5 lbs 6/10  6 lbs 11/13/2019 Rt 4/10  12 lbs 4/10  11 lbs 4/10  10 lbs 4/10  11 lbs   11/15/2019 Rt 4/10  12 lbs 4/10  11 lbs 4/10  10 lbs 4/10  11 lbs   12/16/2019 Rt 3/10  16 lbs 2/10  17 lbs 2/10  16 lbs 2/10  15 lbs     Precautions: Right Knee Surgery

## 2019-12-18 ENCOUNTER — APPOINTMENT (OUTPATIENT)
Dept: PHYSICAL THERAPY | Facility: CLINIC | Age: 24
End: 2019-12-18
Payer: COMMERCIAL

## 2019-12-18 NOTE — PROGRESS NOTES
Today's date: 2019  Patient name: Marcin Mae  : 1995  MRN: [id number]  Referring provider: Alison Padilla MD  Dx:   Encounter Diagnosis     ICD-10-CM    1  Aftercare following surgery Z48 89    2  Difficulty walking R26 2    3  Acute pain of right knee M25 561      Subjective:  Shital Fraire states her ***    Objective: See treatment log below    Assessment: Tolerated treatment {Tolerated treatment :1911015293}   Patient {assessment:3917300625}    Plan: {PLAN:2585070282}     Precautions: Right Knee Surgery    Daily Treatment Log  Manual     MT, ROM     15'   HEP        Exercise Log    Balance Board     30x   Chair Squats        P-Bar-GT-Forward, Backward,Side-Even & Dips     12x   BOSU-Walk     5'   Foam Pad SLR,Hip/KneeFl,Step Ups        Foam Beam        Fitter-Slalom        Monster Steps        BAPS- L-2        GT     120'x2   T/G-Squats PF     30x   W/P-Hip-Abd,Add,Flex,Ext     10#-30x   WP-Squats     5#-30x   Trimax-TKE        Yhaupvl-YQ-Xa        NK Table Exer     5#-30x   NK Table ROM     5x'10'   TM        Stepper        Bike     10'   ME, PE     15'           Modalities    MH&ES     20'   US

## 2019-12-20 ENCOUNTER — APPOINTMENT (OUTPATIENT)
Dept: PHYSICAL THERAPY | Facility: CLINIC | Age: 24
End: 2019-12-20
Payer: COMMERCIAL

## 2019-12-23 ENCOUNTER — APPOINTMENT (OUTPATIENT)
Dept: PHYSICAL THERAPY | Facility: CLINIC | Age: 24
End: 2019-12-23
Payer: COMMERCIAL

## 2019-12-27 ENCOUNTER — APPOINTMENT (OUTPATIENT)
Dept: PHYSICAL THERAPY | Facility: CLINIC | Age: 24
End: 2019-12-27
Payer: COMMERCIAL

## 2019-12-30 ENCOUNTER — OFFICE VISIT (OUTPATIENT)
Dept: PHYSICAL THERAPY | Facility: CLINIC | Age: 24
End: 2019-12-30
Payer: COMMERCIAL

## 2019-12-30 DIAGNOSIS — Z48.89 AFTERCARE FOLLOWING SURGERY: Primary | ICD-10-CM

## 2019-12-30 DIAGNOSIS — R26.2 DIFFICULTY WALKING: ICD-10-CM

## 2019-12-30 DIAGNOSIS — M25.561 ACUTE PAIN OF RIGHT KNEE: ICD-10-CM

## 2019-12-30 PROCEDURE — 97112 NEUROMUSCULAR REEDUCATION: CPT | Performed by: PHYSICAL THERAPIST

## 2019-12-30 PROCEDURE — 97014 ELECTRIC STIMULATION THERAPY: CPT | Performed by: PHYSICAL THERAPIST

## 2019-12-30 PROCEDURE — 97140 MANUAL THERAPY 1/> REGIONS: CPT | Performed by: PHYSICAL THERAPIST

## 2019-12-30 PROCEDURE — 97110 THERAPEUTIC EXERCISES: CPT | Performed by: PHYSICAL THERAPIST

## 2019-12-30 PROCEDURE — G0283 ELEC STIM OTHER THAN WOUND: HCPCS | Performed by: PHYSICAL THERAPIST

## 2019-12-30 NOTE — PROGRESS NOTES
Today's date: 2019  Patient name: Juan Carlos Jasmine  : 1995  MRN: [id number]  Referring provider: Idalia Amezquita MD  Dx:   Encounter Diagnosis     ICD-10-CM    1  Aftercare following surgery Z48 89    2  Difficulty walking R26 2    3  Acute pain of right knee M25 561      Subjective:  Dharmesh Hatfield states her right knee is slowly feeling better  She had been sick but feels better now  Objective: See treatment log below    Assessment: Tolerated treatment well  Patient exhibited good technique with therapeutic exercises and would benefit from continued PT    Plan: Continue per plan of care  Progress treatment as tolerated         Precautions: Right Knee Surgery    Daily Treatment Log  Manual         MT, ROM 15'       HEP        Exercise Log        Balance Board 30x       Chair Squats        P-Bar-GT-Forward, Backward,Side-Even & Dips 12x       BOSU-Walk 5'       Foam Pad SLR,Hip/KneeFl,Step Ups 30x       Foam Beam 12x       Fitter-Slalom        Monster Steps        BAPS- L-2        GT        T/G-Squats PF 30x       W/P-Hip-Abd,Add,Flex,Ext 10#-30x       WP-Squats 5#-30x       Trimax-TKE        Hmnhcjr-HT-Fp 2x2'       NK Table Exer 5#-30x       NK Table ROM 2x10'       TM        Stepper        Bike 10'       ME, PE 15'               Modalities        MH&ES 20'       US

## 2020-01-03 ENCOUNTER — OFFICE VISIT (OUTPATIENT)
Dept: PHYSICAL THERAPY | Facility: CLINIC | Age: 25
End: 2020-01-03
Payer: COMMERCIAL

## 2020-01-03 DIAGNOSIS — Z48.89 AFTERCARE FOLLOWING SURGERY: Primary | ICD-10-CM

## 2020-01-03 DIAGNOSIS — M25.561 ACUTE PAIN OF RIGHT KNEE: ICD-10-CM

## 2020-01-03 DIAGNOSIS — R26.2 DIFFICULTY WALKING: ICD-10-CM

## 2020-01-03 PROCEDURE — 97140 MANUAL THERAPY 1/> REGIONS: CPT

## 2020-01-03 PROCEDURE — 97110 THERAPEUTIC EXERCISES: CPT

## 2020-01-03 NOTE — PROGRESS NOTES
Today's date: 1/3/2020  Patient name: Suhail Dowling  : 1995  MRN: [id number]  Referring provider: Tai Berrios MD  Dx:   Encounter Diagnosis     ICD-10-CM    1  Aftercare following surgery Z48 89    2  Difficulty walking R26 2    3  Acute pain of right knee M25 561      Subjective:  No new c/o pain today  Objective: See treatment log below    Assessment: Tolerated treatment well  Patient exhibited good technique with therapeutic exercises and would benefit from continued PT to increase R knee ROM/strength and endurance to improve mobility and gait  Plan: Continue per plan of care  Progress treatment as tolerated         Precautions: Right Knee Surgery    Daily Treatment Log  Manual  12/30 1/3      MT, ROM 15' 35'      HEP        Exercise Log 12/30 1/3      Balance Board 30x       Chair Squats        P-Bar-GT-Forward, Backward,Side-Even & Dips 12x       BOSU-Walk 5'       Foam Pad SLR,Hip/KneeFl,Step Ups 30x       Foam Beam 12x       Fitter-Slalom        Monster Steps        BAPS- L-2        GT        T/G-Squats PF 30x 30x ea      W/P-Hip-Abd,Add,Flex,Ext 10#-30x       WP-Squats 5#-30x       Trimax-TKE        Litrbjx-TE-Ac 2x2' 2x2'      NK Table Exer 5#-30x 5# 30x      NK Table ROM 2x10' NT      TM        Stepper        Bike 10' 10'      ME, PE 15' 15'              Modalities 12/30 1/3      MH&ES 20' NT      US  NT

## 2020-01-06 ENCOUNTER — APPOINTMENT (OUTPATIENT)
Dept: PHYSICAL THERAPY | Facility: CLINIC | Age: 25
End: 2020-01-06
Payer: COMMERCIAL

## 2020-01-07 ENCOUNTER — OFFICE VISIT (OUTPATIENT)
Dept: PHYSICAL THERAPY | Facility: CLINIC | Age: 25
End: 2020-01-07
Payer: COMMERCIAL

## 2020-01-07 DIAGNOSIS — Z48.89 AFTERCARE FOLLOWING SURGERY: Primary | ICD-10-CM

## 2020-01-07 DIAGNOSIS — M25.561 ACUTE PAIN OF RIGHT KNEE: ICD-10-CM

## 2020-01-07 DIAGNOSIS — R26.2 DIFFICULTY WALKING: ICD-10-CM

## 2020-01-07 PROCEDURE — 97140 MANUAL THERAPY 1/> REGIONS: CPT

## 2020-01-07 PROCEDURE — 97110 THERAPEUTIC EXERCISES: CPT

## 2020-01-07 NOTE — PROGRESS NOTES
Today's date: 2020  Patient name: Mandy Robert  : 1995  MRN: [id number]  Referring provider: Amarjit Paul MD  Dx:   Encounter Diagnosis     ICD-10-CM    1  Aftercare following surgery Z48 89    2  Difficulty walking R26 2    3  Acute pain of right knee M25 561      Subjective:  No new c/o pain today  Objective: See treatment log below    Assessment: Tolerated treatment well  Patient exhibited good technique with therapeutic exercises and would benefit from continued PT to increase R knee ROM/strength and endurance to improve mobility and gait  Plan: Continue per plan of care  Progress treatment as tolerated         Precautions: Right Knee Surgery    Daily Treatment Log  Manual  12/30 1/3 1/7     MT, ROM 15' 35' 30'     HEP        Exercise Log 12/30 1/3 1/7     Balance Board 30x  30x ea     Chair Squats        P-Bar-GT-Forward, Backward,Side-Even & Dips 12x       BOSU-Walk 5'  5'     Foam Pad SLR,Hip/KneeFl,Step Ups 30x       Foam Beam 12x       Fitter-Darellm        Monster Steps        BAPS- L-2        GT        T/G-Squats PF 30x 30x ea 30x ea     W/P-Hip-Abd,Add,Flex,Ext 10#-30x  5# 30x ea     WP-Squats 5#-30x  5# 30x ea     Trimax-TKE        Uywipva-PF-Br 2x2' 2x2' 2x2'     NK Table Exer 5#-30x 5# 30x 5# 30x ea     NK Table ROM 2x10' NT      TM        Stepper        Bike 10' 10' 10'     ME, PE 15' 15' 15'             Modalities 12/30 1/3 1/7     MH&ES 20' NT NT     US  NT NT

## 2020-01-10 ENCOUNTER — APPOINTMENT (OUTPATIENT)
Dept: PHYSICAL THERAPY | Facility: CLINIC | Age: 25
End: 2020-01-10
Payer: COMMERCIAL

## 2020-01-13 ENCOUNTER — OFFICE VISIT (OUTPATIENT)
Dept: PHYSICAL THERAPY | Facility: CLINIC | Age: 25
End: 2020-01-13
Payer: COMMERCIAL

## 2020-01-13 DIAGNOSIS — M25.561 ACUTE PAIN OF RIGHT KNEE: ICD-10-CM

## 2020-01-13 DIAGNOSIS — R26.2 DIFFICULTY WALKING: ICD-10-CM

## 2020-01-13 DIAGNOSIS — Z48.89 AFTERCARE FOLLOWING SURGERY: Primary | ICD-10-CM

## 2020-01-13 PROCEDURE — 97140 MANUAL THERAPY 1/> REGIONS: CPT | Performed by: PHYSICAL THERAPIST

## 2020-01-13 PROCEDURE — 97110 THERAPEUTIC EXERCISES: CPT | Performed by: PHYSICAL THERAPIST

## 2020-01-13 PROCEDURE — G0283 ELEC STIM OTHER THAN WOUND: HCPCS | Performed by: PHYSICAL THERAPIST

## 2020-01-13 PROCEDURE — 97112 NEUROMUSCULAR REEDUCATION: CPT | Performed by: PHYSICAL THERAPIST

## 2020-01-13 PROCEDURE — 97014 ELECTRIC STIMULATION THERAPY: CPT | Performed by: PHYSICAL THERAPIST

## 2020-01-13 NOTE — PROGRESS NOTES
Today's date: 2020  Patient name: Arjun Goldstein  : 1995  MRN: [id number]  Referring provider: Sandy Winters MD  Dx:   Encounter Diagnosis     ICD-10-CM    1  Aftercare following surgery Z48 89    2  Difficulty walking R26 2    3  Acute pain of right knee M25 561      Subjective:  Oral Flake states her right knee is slowly feeling better  She can stand longer and walk further now  Objective: See treatment log below    Assessment: Tolerated treatment well  Patient exhibited good technique with therapeutic exercises and would benefit from continued PT    Plan: Continue per plan of care  Progress treatment as tolerated         Precautions: Right Knee Surgery    Daily Treatment Log  Manual  12/30 1/3 1/7 1/13    MT, ROM 15' 35' 30' 30'    HEP        Exercise Log 12/30 1/3 1/7 1/13    Balance Board 30x  30x ea 30x    Chair Squats        P-Bar-GT-Forward, Backward,Side-Even & Dips 12x   12x    BOSU-Walk 5'  5' 5'    Foam Pad SLR,Hip/KneeFl,Step Ups 30x   30x    Foam Beam 12x   12x    Fitter-Slalom        Monster Steps        BAPS- L-2        GT        T/G-Squats PF 30x 30x ea 30x ea 30x    W/P-Hip-Abd,Add,Flex,Ext 10#-30x  5# 30x ea 5#-30x    WP-Squats 5#-30x  5# 30x ea 5#-30x    Trimax-TKE        Xzxmrer-VH-Lr 2x2' 2x2' 2x2' 2x2'    NK Table Exer 5#-30x 5# 30x 5# 30x ea 5#-30x    NK Table ROM 2x10' NT      TM        Stepper        Bike 10' 10' 10' 10'    ME, PE 15' 15' 15' 15'            Modalities 12/30 1/3 1/7 1/13    MH&ES 20' NT NT 20'    US  NT NT

## 2020-01-15 ENCOUNTER — OFFICE VISIT (OUTPATIENT)
Dept: PHYSICAL THERAPY | Facility: CLINIC | Age: 25
End: 2020-01-15
Payer: COMMERCIAL

## 2020-01-15 DIAGNOSIS — Z48.89 AFTERCARE FOLLOWING SURGERY: Primary | ICD-10-CM

## 2020-01-15 DIAGNOSIS — R26.2 DIFFICULTY WALKING: ICD-10-CM

## 2020-01-15 DIAGNOSIS — M25.561 ACUTE PAIN OF RIGHT KNEE: ICD-10-CM

## 2020-01-15 PROCEDURE — 97140 MANUAL THERAPY 1/> REGIONS: CPT | Performed by: PHYSICAL THERAPIST

## 2020-01-15 PROCEDURE — 97110 THERAPEUTIC EXERCISES: CPT | Performed by: PHYSICAL THERAPIST

## 2020-01-15 PROCEDURE — 97112 NEUROMUSCULAR REEDUCATION: CPT | Performed by: PHYSICAL THERAPIST

## 2020-01-15 NOTE — PROGRESS NOTES
Today's date: 1/15/2020  Patient name: Malcolm Sinclair  : 1995  MRN: [id number]  Referring provider: Viral Winchester MD  Dx:   Encounter Diagnosis     ICD-10-CM    1  Aftercare following surgery Z48 89    2  Acute pain of right knee M25 561    3  Difficulty walking R26 2      Subjective:  Terry Herrera states her right knee is sore today  Objective: See treatment log below    Assessment: Tolerated treatment well  Patient exhibited good technique with therapeutic exercises and would benefit from continued PT    Plan: Continue per plan of care  Progress treatment as tolerated         Precautions: Right Knee Surgery    Daily Treatment Log  Manual  12/30 1/3 1/7 1/13 1/15   MT, ROM 15' 35' 30' 30' 30'   HEP        Exercise Log 12/30 1/3 1/7 1/13 1/15   Balance Board 30x  30x ea 30x 30x   Chair Squats        P-Bar-GT-Forward, Backward,Side-Even & Dips 12x   12x 12x   BOSU-Walk 5'  5' 5' 5'   Foam Pad SLR,Hip/KneeFl,Step Ups 30x   30x 30x   Foam Beam 12x   12x 12x   Fitter-Slalom        Monster Steps        BAPS- L-2        GT        T/G-Squats PF 30x 30x ea 30x ea 30x 30x   W/P-Hip-Abd,Add,Flex,Ext 10#-30x  5# 30x ea 5#-30x 5#-30x   WP-Squats 5#-30x  5# 30x ea 5#-30x 5#-30x   Trimax-TKE        Inhodpc-AL-Qc 2x2' 2x2' 2x2' 2x2' 2x2'   NK Table Exer 5#-30x 5# 30x 5# 30x ea 5#-30x 5#-30x   NK Table ROM 2x10' NT      TM        Stepper        Bike 10' 10' 10' 10' 10'   ME, PE 13' 15' 15' 15' 15'           Modalities 12/30 1/3 1/7 1/13 1/15   MH&ES 20' NT NT 20'    US  NT NT

## 2020-01-17 ENCOUNTER — APPOINTMENT (OUTPATIENT)
Dept: PHYSICAL THERAPY | Facility: CLINIC | Age: 25
End: 2020-01-17
Payer: COMMERCIAL

## 2020-01-22 ENCOUNTER — OFFICE VISIT (OUTPATIENT)
Dept: PHYSICAL THERAPY | Facility: CLINIC | Age: 25
End: 2020-01-22
Payer: COMMERCIAL

## 2020-01-22 DIAGNOSIS — M25.561 ACUTE PAIN OF RIGHT KNEE: ICD-10-CM

## 2020-01-22 DIAGNOSIS — R26.2 DIFFICULTY WALKING: ICD-10-CM

## 2020-01-22 DIAGNOSIS — Z48.89 AFTERCARE FOLLOWING SURGERY: Primary | ICD-10-CM

## 2020-01-22 PROCEDURE — 97164 PT RE-EVAL EST PLAN CARE: CPT | Performed by: PHYSICAL THERAPIST

## 2020-01-22 PROCEDURE — 97112 NEUROMUSCULAR REEDUCATION: CPT | Performed by: PHYSICAL THERAPIST

## 2020-01-22 PROCEDURE — 97140 MANUAL THERAPY 1/> REGIONS: CPT | Performed by: PHYSICAL THERAPIST

## 2020-01-22 PROCEDURE — 97110 THERAPEUTIC EXERCISES: CPT | Performed by: PHYSICAL THERAPIST

## 2020-01-22 NOTE — PROGRESS NOTES
Today's date: 2020  Patient name: Apoorva Apple  : 1995  MRN: [id number]  Referring provider: Andrea Handy MD  Dx:   Encounter Diagnosis     ICD-10-CM    1  Aftercare following surgery Z48 89    2  Acute pain of right knee M25 561    3  Difficulty walking R26 2      Subjective:  Annette Parson states her right knee is slowly improving  She is standing longer and walking further  Objective: See treatment log below    Assessment: Tolerated treatment well  Patient exhibited good technique with therapeutic exercises and would benefit from continued PT    Plan: Continue per plan of care  Progress treatment as tolerated         Precautions: Right Knee Surgery    Daily Treatment Log  Manual         MT, ROM 15'       HEP        Exercise Log        Balance Board 30x       Chair Squats        P-Bar-GT-Forward, Backward,Side-Even & Dips 12x       BOSU-Walk 5'       Foam Pad SLR,Hip/KneeFl,Step Ups 30x       Foam Beam 12x       Fitter-Slalom        Monster Steps        BAPS- L-2        GT        T/G-Squats PF 30x       W/P-Hip-Abd,Add,Flex,Ext 10#-30x       WP-Squats 5#-30x       Trimax-TKE        Mxolcqz-VL-Ar 2x2'       NK Table Exer 5#-30x       NK Table ROM 2x10'       TM        Stepper        Bike 10'       ME, PE 15'               Modalities        MH&ES 20'       US

## 2020-01-22 NOTE — PROGRESS NOTES
PT Re-Evaluation   Today's date: 2020  Patient name: Natalie Alfredo  : 1995  MRN: [id number]  Referring provider: Anthony Castillo MD  Dx:   Encounter Diagnosis     ICD-10-CM    1  Aftercare following surgery Z48 89    2  Acute pain of right knee M25 561    3  Difficulty walking R26 2      Assessment  Assessment details: Patient is progressing well  She was finally able to walk up and down her stairs normal instead of one step at a time  Impairments: abnormal or restricted ROM, abnormal movement, activity intolerance, impaired balance, impaired physical strength, pain with function and safety issue  Understanding of Dx/Px/POC: excellent  Goals  STG 2-4 weeks:    Increase B LE strength 2-5 lbs  Decrease pain by 1-2 levels on 1-10 pain scale  Increase standing/walking tolerance to >30 minutes  Patient independent with HEP  LTG 6-8 weeks:   Increase B LE strength 10-20 lbs  Decrease pain to 0-2/10 with activity  Increase single leg stance >30 seconds  Increase standing/walking tolerance to >90 minutes  Increase range of motion to normal   Improve gait pattern, coordination and balance to normal   D/C with HEP  Plan  Plan details: All planned modality interventions and planned therapy interventions are provided PRN    Patient would benefit from: PT eval and skilled physical therapy  Planned modality interventions: unattended electrical stimulation  Planned therapy interventions: joint mobilization, manual therapy, neuromuscular re-education, patient education, postural training, self care, strengthening, stretching, therapeutic activities, therapeutic exercise, therapeutic training, transfer training, flexibility, gait training, coordination, balance, balance/weight bearing training, home exercise program and graded exercise  Frequency: 3x week  Duration in weeks: 6  Treatment plan discussed with: patient      Subjective Evaluation    Pain  Quality: discomfort, knife-like, pulling and squeezing  Relieving factors: relaxation, rest, support and change in position  Aggravating factors: lifting, running, stair climbing, walking and standing  Progression: improved    Treatments  Previous treatment: physical therapy  Current treatment: physical therapy  Patient Goals  Patient goals for therapy: decreased pain, improved balance, increased motion, return to work, return to [employer] activities, independence with ADLs/IADLs and increased strength        Objective     Date of onset:  9/16/2019    Date of Surgery:  9/16/2019    History of Present Episode: 10/7/2019  [facility] states she has had issues with her right knee for several years  She finally went for surgery since she fell recently on her right knee and was in severe pain  Past Medical History:    10/7/2019  [facility] reports right knee issues since age 15  Asthma  Thyroid issues  Previous Level of Functional Ability:  10/7/2019  [facility] states her right knee was getting worse and worse to walk at times  Inspection / Palpation:  Knee:  10/7/2019  Mesomorphic / Endomorphic body type  Moderate to severe signs of infection  No signs of wounds  No signs of drainage  Mild to moderate signs of ecchymotic regions  Mild to moderate signs of erythremic regions  Moderate signs of muscle spasm  Moderate signs of muscle guarding  Moderate signs of tenderness reported to palpation  Moderate signs of swelling  Positive signs of a surgery site  Current conditions appears consistent with recent acute surgery recovery episode  Chief Complaints:  10/7/2019  [facility] reports moderate to severe difficulty with standing  [facility] reports moderate to severe difficulty with walking  [facility] reports moderate to severe difficulty with movement / use of her right knee  [facility] reports severe difficulty with use of stairs  [facility] reports severe difficulty with running  [facility] reports severe difficulty with jumping    [facility] reports moderate to severe difficulty with use of inclines  Chito Randolph reports moderate difficulty with sleeping  Chito Randolph reports moderate difficulty with her strength and endurance  Chito Randolph reports moderate to severe limitations with her range of motion  Chito Randolph reports moderate to severe difficulty lying on her right knee region      KNEE PAIN Resting Moving Palpation Standing Walking   10/7/2019 Lt 0 0 0 0 0   10/7/2019 Rt 0-3 2-6 4-7 3-7 3-7   11/13/2019 Rt 0-2 1-4 2-5 2-5 2-5   11/15/2019 Rt 0-2 1-4 2-5 2-5 2-5   12/16/2019 Rt 0-1 1-3 1-4 1-4 1-4   1/22/2020 Rt 0-1 1-2 1-2 1-2 1-2     KNEE PAIN Running Stairs Sleeping Twisting Jumping   10/7/2019 Lt 0 0 0 0 0   10/7/2019 Rt NA NA 3-6 4-7 NA   11/13/2019 Rt NA NA 1-4 2-5 NA   11/15/2019 Rt NA NA 1-4 2-5 NA   12/16/2019 Rt NA NA 1-3 1-4 NA   1/22/2020 Rt 3-6 2-5 1-2 1-2 3-6     KNEE AROM Flexion Extension SLR   10/7/2019 Lt 140° 0° 95°   10/7/2019 Rt 65° 0° 74°   11/13/2019 Rt 95° 0° 82°   11/15/2019 Rt 95° 0° 82°   12/16/2109 Rt 121° 0° 88°   1/22/2020 Rt 129° 0° 92°     KNEE MMT Flexion Extension Varus Stress Valgus Stress   10/7/2019 Lt 0/10  35 lbs 0/10  33 lbs 0/10  29 lbs 0/10  30 lbs   10/7/2019 Rt 5/10  7 lbs 5/10  6 lbs 5/10  5 lbs 6/10  6 lbs   11/13/2019 Rt 4/10  12 lbs 4/10  11 lbs 4/10  10 lbs 4/10  11 lbs   11/15/2019 Rt 4/10  12 lbs 4/10  11 lbs 4/10  10 lbs 4/10  11 lbs   12/16/2019 Rt 3/10  16 lbs 2/10  17 lbs 2/10  16 lbs 2/10  15 lbs   1/22/2020 Rt 2/10  20 lbs 1/10  19 lbs 1/10  18 lbs 1/10  19 lbs     Precautions: Right Knee Surgery

## 2020-01-22 NOTE — LETTER
2020  PT Reevaluation Padmini Michelle MD  [address], [address]    Patient: Kristi Jaffe   YOB: 1995   Date of Visit: 2020     Encounter Diagnosis     ICD-10-CM    1  Aftercare following surgery Z48 89    2  Acute pain of right knee M25 561    3  Difficulty walking R26 2      Dear Dr Galina Isaac:  Thank you for your recent referral of Kristi Jaffe  Please review the attached evaluation summary from Maida Agrawal recent visit  Please verify that you agree with the plan of care by signing the attached order  If you have any questions or concerns, please do not hesitate to call  I sincerely appreciate the opportunity to share in the care of one of your patients and hope to have another opportunity to work with you in the near future  Sincerely,    Nimisha Loo, PT    Referring Provider:      I certify that I have read the below Plan of Care and certify the need for these services furnished under this plan of treatment while under my care  Ariana Levin MD  [address] [place] [id number]  VIA In Basket    Please SIGN ABOVE and return THIS PAGE ONLY to Fax # [phone]          Today's date: 2020  Patient name: Kristi Jaffe  : 1995  MRN: [id number]  Referring provider: Dhruv Ramos MD  Dx:   Encounter Diagnosis     ICD-10-CM    1  Aftercare following surgery Z48 89    2  Acute pain of right knee M25 561    3  Difficulty walking R26 2      Subjective:  [facility] states her right knee is slowly improving  She is standing longer and walking further  Objective: See treatment log below    Assessment: Tolerated treatment well  Patient exhibited good technique with therapeutic exercises and would benefit from continued PT    Plan: Continue per plan of care  Progress treatment as tolerated         Precautions: Right Knee Surgery    Daily Treatment Log  Manual         MT, ROM 15'       HEP        Exercise Log  Balance Board 30x       Chair Squats        P-Bar-GT-Forward, Backward,Side-Even & Dips 12x       BOSU-Walk 5'       Foam Pad SLR,Hip/KneeFl,Step Ups 30x       Foam Beam 12x       Fitter-Slalom        Monster Steps        BAPS- L-2        GT        T/G-Squats PF 30x       W/P-Hip-Abd,Add,Flex,Ext 10#-30x       WP-Squats 5#-30x       Trimax-TKE        Fuxegbq-ZC-Rh 2x2'       NK Table Exer 5#-30x       NK Table ROM 2x10'       TM        Stepper        Bike 10'       ME, PE 15'               Modalities        MH&ES 20'       US            PT Re-Evaluation   Today's date: 2020  Patient name: Kristi Jaffe  : 1995  MRN: [id number]  Referring provider: Dhruv Ramos MD  Dx:   Encounter Diagnosis     ICD-10-CM    1  Aftercare following surgery Z48 89    2  Acute pain of right knee M25 561    3  Difficulty walking R26 2      Assessment  Assessment details: Patient is progressing well  She was finally able to walk up and down her stairs normal instead of one step at a time  Impairments: abnormal or restricted ROM, abnormal movement, activity intolerance, impaired balance, impaired physical strength, pain with function and safety issue  Understanding of Dx/Px/POC: excellent  Goals  STG 2-4 weeks:    Increase B LE strength 2-5 lbs  Decrease pain by 1-2 levels on 1-10 pain scale  Increase standing/walking tolerance to >30 minutes  Patient independent with HEP  LTG 6-8 weeks:   Increase B LE strength 10-20 lbs  Decrease pain to 0-2/10 with activity  Increase single leg stance >30 seconds  Increase standing/walking tolerance to >90 minutes  Increase range of motion to normal   Improve gait pattern, coordination and balance to normal   D/C with HEP  Plan  Plan details: All planned modality interventions and planned therapy interventions are provided PRN    Patient would benefit from: PT eval and skilled physical therapy  Planned modality interventions: unattended electrical stimulation  Planned therapy interventions: joint mobilization, manual therapy, neuromuscular re-education, patient education, postural training, self care, strengthening, stretching, therapeutic activities, therapeutic exercise, therapeutic training, transfer training, flexibility, gait training, coordination, balance, balance/weight bearing training, home exercise program and graded exercise  Frequency: 3x week  Duration in weeks: 6  Treatment plan discussed with: patient      Subjective Evaluation    Pain  Quality: discomfort, knife-like, pulling and squeezing  Relieving factors: relaxation, rest, support and change in position  Aggravating factors: lifting, running, stair climbing, walking and standing  Progression: improved    Treatments  Previous treatment: physical therapy  Current treatment: physical therapy  Patient Goals  Patient goals for therapy: decreased pain, improved balance, increased motion, return to work, return to [employer] activities, independence with ADLs/IADLs and increased strength        Objective     Date of onset:  9/16/2019    Date of Surgery:  9/16/2019    History of Present Episode: 10/7/2019  Ekaterina Edmond states she has had issues with her right knee for several years  She finally went for surgery since she fell recently on her right knee and was in severe pain  Past Medical History:    10/7/2019  Ekaterina Edmond reports right knee issues since age 15  Asthma  Thyroid issues  Previous Level of Functional Ability:  10/7/2019  Ekaterina Edmond states her right knee was getting worse and worse to walk at times  Inspection / Palpation:  Knee:  10/7/2019  Mesomorphic / Endomorphic body type  Moderate to severe signs of infection  No signs of wounds  No signs of drainage  Mild to moderate signs of ecchymotic regions  Mild to moderate signs of erythremic regions  Moderate signs of muscle spasm  Moderate signs of muscle guarding  Moderate signs of tenderness reported to palpation    Moderate signs of swelling  Positive signs of a surgery site  Current conditions appears consistent with recent acute surgery recovery episode  Chief Complaints:  10/7/2019  [address] reports moderate to severe difficulty with standing  [address] reports moderate to severe difficulty with walking  [address] reports moderate to severe difficulty with movement / use of her right knee  [address] reports severe difficulty with use of stairs  [address] reports severe difficulty with running  [address] reports severe difficulty with jumping  [address] reports moderate to severe difficulty with use of inclines  [address] reports moderate difficulty with sleeping  [address] reports moderate difficulty with her strength and endurance  [address] reports moderate to severe limitations with her range of motion  [address] reports moderate to severe difficulty lying on her right knee region      KNEE PAIN Resting Moving Palpation Standing Walking   10/7/2019 Lt 0 0 0 0 0   10/7/2019 Rt 0-3 2-6 4-7 3-7 3-7   11/13/2019 Rt 0-2 1-4 2-5 2-5 2-5   11/15/2019 Rt 0-2 1-4 2-5 2-5 2-5   12/16/2019 Rt 0-1 1-3 1-4 1-4 1-4   1/22/2020 Rt 0-1 1-2 1-2 1-2 1-2     KNEE PAIN Running Stairs Sleeping Twisting Jumping   10/7/2019 Lt 0 0 0 0 0   10/7/2019 Rt NA NA 3-6 4-7 NA   11/13/2019 Rt NA NA 1-4 2-5 NA   11/15/2019 Rt NA NA 1-4 2-5 NA   12/16/2019 Rt NA NA 1-3 1-4 NA   1/22/2020 Rt 3-6 2-5 1-2 1-2 3-6     KNEE AROM Flexion Extension SLR   10/7/2019 Lt 140° 0° 95°   10/7/2019 Rt 65° 0° 74°   11/13/2019 Rt 95° 0° 82°   11/15/2019 Rt 95° 0° 82°   12/16/2109 Rt 121° 0° 88°   1/22/2020 Rt 129° 0° 92°     KNEE MMT Flexion Extension Varus Stress Valgus Stress   10/7/2019 Lt 0/10  35 lbs 0/10  33 lbs 0/10  29 lbs 0/10  30 lbs   10/7/2019 Rt 5/10  7 lbs 5/10  6 lbs 5/10  5 lbs 6/10  6 lbs   11/13/2019 Rt 4/10  12 lbs 4/10  11 lbs 4/10  10 lbs 4/10  11 lbs   11/15/2019 Rt 4/10  12 lbs 4/10  11 lbs 4/10  10 lbs 4/10  11 lbs   12/16/2019 Rt 3/10  16 lbs 2/10  17 lbs 2/10  16 lbs 2/10 15 lbs   1/22/2020 Rt 2/10  20 lbs 1/10  19 lbs 1/10  18 lbs 1/10  19 lbs     Precautions: Right Knee Surgery

## 2020-01-24 ENCOUNTER — OFFICE VISIT (OUTPATIENT)
Dept: PHYSICAL THERAPY | Facility: CLINIC | Age: 25
End: 2020-01-24
Payer: COMMERCIAL

## 2020-01-24 DIAGNOSIS — M25.561 ACUTE PAIN OF RIGHT KNEE: ICD-10-CM

## 2020-01-24 DIAGNOSIS — Z48.89 AFTERCARE FOLLOWING SURGERY: Primary | ICD-10-CM

## 2020-01-24 DIAGNOSIS — R26.2 DIFFICULTY WALKING: ICD-10-CM

## 2020-01-24 PROCEDURE — 97110 THERAPEUTIC EXERCISES: CPT | Performed by: PHYSICAL THERAPIST

## 2020-01-24 PROCEDURE — 97014 ELECTRIC STIMULATION THERAPY: CPT | Performed by: PHYSICAL THERAPIST

## 2020-01-24 PROCEDURE — G0283 ELEC STIM OTHER THAN WOUND: HCPCS | Performed by: PHYSICAL THERAPIST

## 2020-01-24 PROCEDURE — 97112 NEUROMUSCULAR REEDUCATION: CPT | Performed by: PHYSICAL THERAPIST

## 2020-01-24 PROCEDURE — 97140 MANUAL THERAPY 1/> REGIONS: CPT | Performed by: PHYSICAL THERAPIST

## 2020-01-24 NOTE — PROGRESS NOTES
Today's date: 2020  Patient name: Arjun Goldstein  : 1995  MRN: [id number]  Referring provider: Sandy Winters MD  Dx:   Encounter Diagnosis     ICD-10-CM    1  Aftercare following surgery Z48 89    2  Acute pain of right knee M25 561    3  Difficulty walking R26 2      Subjective:  Oral Flake states her right knee is feeling better  Objective: See treatment log below    Assessment: Tolerated treatment well  Patient exhibited good technique with therapeutic exercises and would benefit from continued PT    Plan: Continue per plan of care  Progress treatment as tolerated         Precautions: Right Knee Surgery    Daily Treatment Log  Manual        MT, ROM 15' 15'      HEP        Exercise Log       Balance Board 30x 30x      Chair Squats        P-Bar-GT-Forward, Backward,Side-Even & Dips 12x 12x      BOSU-Walk 5' 5'      Foam Pad SLR,Hip/KneeFl,Step Ups 30x 30x      Foam Beam 12x 12x      Fitter-Slalom        Monster Steps        BAPS- L-2        GT        T/G-Squats PF 30x 30x      W/P-Hip-Abd,Add,Flex,Ext 10#-30x 10$#-30x      WP-Squats 5#-30x 5#-30x      Trimax-TKE        Btatmwq-FU-Kt 2x2' 2x2'      NK Table Exer 5#-30x 5#-30x      NK Table ROM 2x10' 2x10'      TM        Stepper        Bike 10' 10'      ME, PE 15' 15'              Modalities       MH&ES 20' 20'      US

## 2020-01-29 ENCOUNTER — OFFICE VISIT (OUTPATIENT)
Dept: PHYSICAL THERAPY | Facility: CLINIC | Age: 25
End: 2020-01-29
Payer: COMMERCIAL

## 2020-01-29 DIAGNOSIS — M25.561 ACUTE PAIN OF RIGHT KNEE: ICD-10-CM

## 2020-01-29 DIAGNOSIS — Z48.89 AFTERCARE FOLLOWING SURGERY: Primary | ICD-10-CM

## 2020-01-29 DIAGNOSIS — R26.2 DIFFICULTY WALKING: ICD-10-CM

## 2020-01-29 PROCEDURE — 97140 MANUAL THERAPY 1/> REGIONS: CPT

## 2020-01-29 PROCEDURE — 97110 THERAPEUTIC EXERCISES: CPT

## 2020-01-29 PROCEDURE — G0283 ELEC STIM OTHER THAN WOUND: HCPCS

## 2020-01-29 PROCEDURE — 97014 ELECTRIC STIMULATION THERAPY: CPT

## 2020-01-29 NOTE — PROGRESS NOTES
Today's date: 2020  Patient name: Anastasia Morales  : 1995  MRN: [id number]  Referring provider: Jazz White MD  Dx:   Encounter Diagnosis     ICD-10-CM    1  Aftercare following surgery Z48 89    2  Acute pain of right knee M25 561    3  Difficulty walking R26 2      Subjective:  "My back has really bothering me  The insurance canceled my MRI for my back "    Objective: See treatment log below    Assessment: Tolerated treatment well  Patient exhibited good technique with therapeutic exercises and would benefit from continued PT to increase R knee ROM/strength and endurance to improve mobility and gait  Plan: Continue per plan of care  Progress treatment as tolerated         Precautions: Right Knee Surgery    Daily Treatment Log  Manual       MT, ROM 15' 15' 20'     HEP        Exercise Log      Balance Board 30x 30x NT     Chair Squats        P-Bar-GT-Forward, Backward,Side-Even & Dips 12x 12x NT     BOSU-Walk 5' 5' NT     Foam Pad SLR,Hip/KneeFl,Step Ups 30x 30x NT     Foam Beam 12x 12x NT     Fitter-Slalom        Monster Steps        BAPS- L-2        GT        T/G-Squats PF 30x 30x 30x ea     W/P-Hip-Abd,Add,Flex,Ext 10#-30x 10$#-30x NT     WP-Squats 5#-30x 5#-30x NT     Trimax-TKE        Xezfgbh-JL-Ww 2x2' 2x2' 2x2'     NK Table Exer 5#-30x 5#-30x 5# 30x ea     NK Table ROM 2x10' 2x10' NT     TM        Stepper        Bike 10' 10' 10'     ME, PE 15' 15' 15'             Modalities      MH&ES 20' 20' 20'     US

## 2020-01-31 ENCOUNTER — APPOINTMENT (OUTPATIENT)
Dept: PHYSICAL THERAPY | Facility: CLINIC | Age: 25
End: 2020-01-31
Payer: COMMERCIAL

## 2020-02-03 ENCOUNTER — APPOINTMENT (OUTPATIENT)
Dept: PHYSICAL THERAPY | Facility: CLINIC | Age: 25
End: 2020-02-03
Payer: COMMERCIAL

## 2020-02-04 ENCOUNTER — OFFICE VISIT (OUTPATIENT)
Dept: PHYSICAL THERAPY | Facility: CLINIC | Age: 25
End: 2020-02-04
Payer: COMMERCIAL

## 2020-02-04 DIAGNOSIS — Z48.89 AFTERCARE FOLLOWING SURGERY: Primary | ICD-10-CM

## 2020-02-04 DIAGNOSIS — M25.561 ACUTE PAIN OF RIGHT KNEE: ICD-10-CM

## 2020-02-04 DIAGNOSIS — R26.2 DIFFICULTY WALKING: ICD-10-CM

## 2020-02-04 PROCEDURE — 97110 THERAPEUTIC EXERCISES: CPT | Performed by: PHYSICAL THERAPIST

## 2020-02-04 PROCEDURE — 97014 ELECTRIC STIMULATION THERAPY: CPT | Performed by: PHYSICAL THERAPIST

## 2020-02-04 PROCEDURE — 97116 GAIT TRAINING THERAPY: CPT | Performed by: PHYSICAL THERAPIST

## 2020-02-04 PROCEDURE — G0283 ELEC STIM OTHER THAN WOUND: HCPCS | Performed by: PHYSICAL THERAPIST

## 2020-02-04 PROCEDURE — 97112 NEUROMUSCULAR REEDUCATION: CPT | Performed by: PHYSICAL THERAPIST

## 2020-02-04 PROCEDURE — 97140 MANUAL THERAPY 1/> REGIONS: CPT | Performed by: PHYSICAL THERAPIST

## 2020-02-04 NOTE — PROGRESS NOTES
Today's date: 2020  Patient name: Vivienne Flores  : 1995  MRN: [id number]  Referring provider: Dwayne Qureshi MD  Dx:   Encounter Diagnosis     ICD-10-CM    1  Aftercare following surgery Z48 89    2  Acute pain of right knee M25 561    3  Difficulty walking R26 2      Subjective:  Narcisa Worthy states her right knee is really starting to feel a lot better  She can walk much better and stand longer  Objective: See treatment log below    Assessment: Tolerated treatment well  Patient exhibited good technique with therapeutic exercises and would benefit from continued PT    Plan: Continue per plan of care  Progress treatment as tolerated         Precautions: Right Knee Surgery    Daily Treatment Log  Manual   2/4    MT, ROM 15' 15' 20' 15'    HEP        Exercise Log     Balance Board 30x 30x NT 30x    Chair Squats        P-Bar-GT-Forward, Backward,Side-Even & Dips 12x 12x NT 5x    BOSU-Walk 5' 5' NT 5'    Foam Pad SLR,Hip/KneeFl,Step Ups 30x 30x NT 30x    Foam Beam 12x 12x NT 30x    Fitter-Slalom        Monster Steps        BAPS- L-2        GT        T/G-Squats PF 30x 30x 30x ea 30x    W/P-Hip-Abd,Add,Flex,Ext 10#-30x 10$#-30x NT 10#-30x    WP-Squats 5#-30x 5#-30x NT 5#-30x    Trimax-TKE        Jhoyufp-KY-Mq 2x2' 2x2' 2x2' 2x2'    NK Table Exer 5#-30x 5#-30x 5# 30x ea 5#-30x    NK Table ROM 2x10' 2x10' NT     TM        Stepper        Bike 10' 10' 10' 10'    ME, PE 15' 15' 15' 15'            Modalities  2/4    MH&ES 20' 20' 20' 20'

## 2020-02-07 ENCOUNTER — OFFICE VISIT (OUTPATIENT)
Dept: PHYSICAL THERAPY | Facility: CLINIC | Age: 25
End: 2020-02-07
Payer: COMMERCIAL

## 2020-02-07 DIAGNOSIS — M25.561 ACUTE PAIN OF RIGHT KNEE: ICD-10-CM

## 2020-02-07 DIAGNOSIS — Z48.89 AFTERCARE FOLLOWING SURGERY: Primary | ICD-10-CM

## 2020-02-07 DIAGNOSIS — R26.2 DIFFICULTY WALKING: ICD-10-CM

## 2020-02-07 PROCEDURE — 97112 NEUROMUSCULAR REEDUCATION: CPT | Performed by: PHYSICAL THERAPIST

## 2020-02-07 PROCEDURE — 97110 THERAPEUTIC EXERCISES: CPT | Performed by: PHYSICAL THERAPIST

## 2020-02-07 PROCEDURE — G0283 ELEC STIM OTHER THAN WOUND: HCPCS | Performed by: PHYSICAL THERAPIST

## 2020-02-07 PROCEDURE — 97014 ELECTRIC STIMULATION THERAPY: CPT | Performed by: PHYSICAL THERAPIST

## 2020-02-07 PROCEDURE — 97140 MANUAL THERAPY 1/> REGIONS: CPT | Performed by: PHYSICAL THERAPIST

## 2020-02-07 NOTE — PROGRESS NOTES
Today's date: 2020  Patient name: Pamela Hurt  : 1995  MRN: [id number]  Referring provider: Salomón Rahman MD  Dx:   Encounter Diagnosis     ICD-10-CM    1  Aftercare following surgery Z48 89    2  Acute pain of right knee M25 561    3  Difficulty walking R26 2      Subjective:  Leticia Cuevas states her knee is steadily feeling better  She is walking and standing better  Stairs are almost normal now but still a challenge  Objective: See treatment log below    Assessment: Tolerated treatment well  Patient exhibited good technique with therapeutic exercises and would benefit from continued PT    Plan: Continue per plan of care  Progress treatment as tolerated         Precautions: Right Knee Surgery    Daily Treatment Log  Manual     MT, ROM 15' 15' 20' 15' 15'   HEP        Exercise Log    Balance Board 30x 30x NT 30x 30x   Chair Squats        P-Bar-GT-Forward, Backward,Side-Even & Dips 12x 12x NT 5x 5x   BOSU-Walk 5' 5' NT 5' 5'   Foam Pad SLR,Hip/KneeFl,Step Ups 30x 30x NT 30x 30x   Foam Beam 12x 12x NT 30x 30x   Fitter-Slalom        Monster Steps        BAPS- L-2        GT        T/G-Squats PF 30x 30x 30x ea 30x 30x   W/P-Hip-Abd,Add,Flex,Ext 10#-30x 10$#-30x NT 10#-30x 10#-30x   WP-Squats 5#-30x 5#-30x NT 5#-30x 5#-30x   Trimax-TKE        Akdtjyd-XK-Sp 2x2' 2x2' 2x2' 2x2' 2x2'   NK Table Exer 5#-30x 5#-30x 5# 30x ea 5#-30x 5#-30x   NK Table ROM 2x10' 2x10' NT  2x10'   TM        Stepper        Bike 10' 10' 10' 10' 10'   ME, PE 15' 15' 15' 15' 15'           Modalities    MH&ES 20' 20' 20' 20' 20'

## 2020-02-11 ENCOUNTER — OFFICE VISIT (OUTPATIENT)
Dept: PHYSICAL THERAPY | Facility: CLINIC | Age: 25
End: 2020-02-11
Payer: COMMERCIAL

## 2020-02-11 DIAGNOSIS — M25.561 ACUTE PAIN OF RIGHT KNEE: ICD-10-CM

## 2020-02-11 DIAGNOSIS — R26.2 DIFFICULTY WALKING: ICD-10-CM

## 2020-02-11 DIAGNOSIS — Z48.89 AFTERCARE FOLLOWING SURGERY: Primary | ICD-10-CM

## 2020-02-11 PROCEDURE — 97110 THERAPEUTIC EXERCISES: CPT

## 2020-02-11 PROCEDURE — 97140 MANUAL THERAPY 1/> REGIONS: CPT

## 2020-02-11 NOTE — PROGRESS NOTES
Today's date: 2020  Patient name: Cassandra Winters  : 1995  MRN: [id number]  Referring provider: Jovanna Madrigal MD  Dx:   Encounter Diagnosis     ICD-10-CM    1  Aftercare following surgery Z48 89    2  Acute pain of right knee M25 561    3  Difficulty walking R26 2      Subjective:  No new c/o pain today  Objective: See treatment log below    Assessment: Tolerated treatment well  Patient exhibited good technique with therapeutic exercises and would benefit from continued PT to increase R knee ROM/strength and endurance to improve mobility and gait  Plan: Continue per plan of care  Progress treatment as tolerated         Precautions: Right Knee Surgery    Daily Treatment Log  Manual     MT, ROM 35'    15'   HEP        Exercise Log    Balance Board 30x    30x   Chair Squats        P-Bar-GT-Forward, Backward,Side-Even & Dips     5x   BOSU-Walk 3'    5'   Foam Pad SLR,Hip/KneeFl,Step Ups     30x   Foam Beam     30x   T/G-Squats PF 30x    30x   W/P-Hip-Abd,Add,Flex,Ext 5#-30x    10#-30x   WP-Squats 5#-30x    5#-30x   Trimax-TKE        Pwjsbbh-LL-Uc 2x2'    2x2'   NK Table Exer     5#-30x   NK Table ROM     2x10'   TM        Stepper        Bike 10'    10'   ME, PE 15'    15'           Modalities    MH&ES 20'    20'   US

## 2020-02-13 ENCOUNTER — OFFICE VISIT (OUTPATIENT)
Dept: PHYSICAL THERAPY | Facility: CLINIC | Age: 25
End: 2020-02-13
Payer: COMMERCIAL

## 2020-02-13 DIAGNOSIS — M25.561 ACUTE PAIN OF RIGHT KNEE: ICD-10-CM

## 2020-02-13 DIAGNOSIS — Z48.89 AFTERCARE FOLLOWING SURGERY: Primary | ICD-10-CM

## 2020-02-13 DIAGNOSIS — R26.2 DIFFICULTY WALKING: ICD-10-CM

## 2020-02-13 PROCEDURE — 97140 MANUAL THERAPY 1/> REGIONS: CPT

## 2020-02-13 PROCEDURE — 97110 THERAPEUTIC EXERCISES: CPT

## 2020-02-13 NOTE — PROGRESS NOTES
Today's date: 2020  Patient name: Jannet Varner  : 1995  MRN: [id number]  Referring provider: Adalberto To MD  Dx:   Encounter Diagnosis     ICD-10-CM    1  Aftercare following surgery Z48 89    2  Acute pain of right knee M25 561    3  Difficulty walking R26 2      Subjective:  "I still can't go up and down stairs well  I have to do one step at a time "    Objective: See treatment log below    Assessment: Tolerated treatment well  Patient exhibited good technique with therapeutic exercises and would benefit from continued PT to increase R knee ROM/strength and endurance to improve mobility and gait  Plan: Continue per plan of care  Progress treatment as tolerated         Precautions: Right Knee Surgery    Daily Treatment Log  Manual        MT, ROM 35' 35'      HEP        Exercise Log       Balance Board 30x 30x ea      Chair Squats        P-Bar-GT-Forward, Backward,Side-Even & Dips        BOSU-Walk 3' 3'      Foam Pad SLR,Hip/KneeFl,Step Ups        Foam Beam        T/G-Squats PF 30x 30x ea      W/P-Hip-Abd,Add,Flex,Ext 5#-30x 5# 30x ea      WP-Squats 5#-30x 5# 30x ea      Trimax-TKE        [place]-BP-Ln 2x2' 2x2'      NK Table Exer  5# 30x ea      NK Table ROM        TM        Stepper        Bike 10' 10'      ME, PE 13' 15'              Modalities       MH&ES 20'  NT      US

## 2020-02-17 ENCOUNTER — OFFICE VISIT (OUTPATIENT)
Dept: PHYSICAL THERAPY | Facility: CLINIC | Age: 25
End: 2020-02-17
Payer: COMMERCIAL

## 2020-02-17 DIAGNOSIS — R26.2 DIFFICULTY WALKING: ICD-10-CM

## 2020-02-17 DIAGNOSIS — Z48.89 AFTERCARE FOLLOWING SURGERY: Primary | ICD-10-CM

## 2020-02-17 DIAGNOSIS — M54.50 ACUTE BILATERAL LOW BACK PAIN WITHOUT SCIATICA: ICD-10-CM

## 2020-02-17 DIAGNOSIS — M25.561 ACUTE PAIN OF RIGHT KNEE: ICD-10-CM

## 2020-02-17 PROCEDURE — 97112 NEUROMUSCULAR REEDUCATION: CPT | Performed by: PHYSICAL THERAPIST

## 2020-02-17 PROCEDURE — 97140 MANUAL THERAPY 1/> REGIONS: CPT | Performed by: PHYSICAL THERAPIST

## 2020-02-17 PROCEDURE — 97162 PT EVAL MOD COMPLEX 30 MIN: CPT | Performed by: PHYSICAL THERAPIST

## 2020-02-17 PROCEDURE — 97110 THERAPEUTIC EXERCISES: CPT | Performed by: PHYSICAL THERAPIST

## 2020-02-17 NOTE — LETTER
2020  PT Evaluation Plan of [address] Mony Joya MD  [address] [facility]    Patient: Vivienne Flores   YOB: 1995   Date of Visit: 2020     Encounter Diagnosis     ICD-10-CM    1  Aftercare following surgery Z48 89    2  Acute bilateral low back pain without sciatica M54 5    3  Acute pain of right knee M25 561    4  Difficulty walking R26 2      Dear Dr Alcira Mckinnon:  Thank you for your recent referral of Vivienne Flores  Please review the attached evaluation summary from [place] [address] recent visit  Please verify that you agree with the plan of care by signing the attached order  If you have any questions or concerns, please do not hesitate to call  I sincerely appreciate the opportunity to share in the care of one of your patients and hope to have another opportunity to work with you in the near future  Sincerely,    Tristan Gallo, PT    Referring Provider:      I certify that I have read the below Plan of Care and certify the need for these services furnished under this plan of treatment while under my care  Ranjan Johnson MD  [address] [place] [address] In Basket    Please SIGN ABOVE and return THIS PAGE ONLY to Fax # [phone]        PT Evaluation   Today's date: 2020  Patient name: Vivienne Flores  : 1995  MRN: [id number]  Referring provider: Dwayne Qureshi MD  Dx:   Encounter Diagnosis     ICD-10-CM    1  Aftercare following surgery Z48 89    2  Acute bilateral low back pain without sciatica M54 5    3  Acute pain of right knee M25 561    4  Difficulty walking R26 2      Assessment  Assessment details: Patient states her low back region is really flared up  No history of trauma or injury  Previous back surgery noted    Impairments: abnormal gait, abnormal or restricted ROM, abnormal movement, activity intolerance, impaired balance, impaired physical strength, lacks appropriate home exercise program, pain with function, safety issue and weight-bearing intolerance  Understanding of Dx/Px/POC: excellent   Prognosis: good    Goals  STG  2-4 weeks:   Increase lumbar spine AROM by 2-4 degrees  Increase lower extremity strength by 2-4 lbs in all weak areas  Improve sitting posture and body mechanics  Increase standing/ADL tolerance minutes  Decrease pain by 25-50% with standing, walking, and stairs  Initiate HEP  LTG  6-8 weeks:   Demonstrate normal lumbar rotation  Decrease pain to 1-2/10 with activity  Increase lower extremity strength by 10-20 lbs in all weak areas  Improve spinal stability  Improve gait pattern and balance  Decrease limitations with standing, walking and ADL's  DC with HEP  Patsi Reil Plan  Plan details: All planned modality interventions and planned therapy interventions are provided PRN    Patient would benefit from: PT eval and skilled physical therapy  Planned modality interventions: TENS and unattended electrical stimulation  Planned therapy interventions: joint mobilization, manual therapy, balance, balance/weight bearing training, muscle pump exercises, neuromuscular re-education, postural training, patient education, self care, strengthening, stretching, therapeutic activities, therapeutic exercise, therapeutic training, transfer training, home exercise program, graded exercise, gait training, flexibility and coordination  Frequency: 3x week  Duration in weeks: 12  Treatment plan discussed with: patient      Subjective Evaluation    Pain  Relieving factors: heat, ice, relaxation, rest and support  Aggravating factors: keyboarding, lifting, overhead activity and nothing  Progression: improved    Treatments  Previous treatment: physical therapy  Current treatment: physical therapy  Patient Goals  Patient goals for therapy: decreased pain, improved balance, increased motion, return to work, return to [employer] activities, independence with ADLs/IADLs and increased strength        Objective     Date of onset:  1/2/2020    Date of Surgery:  9/16/2019 Right Knee SX    History of Present Episode: 2/17/2020  Oral Flake states he rack flared up about two to three months ago  She remember no no history of injury or accident  Past Medical History:    2/17/2020  Oral Flake reports right knee issues since age 15  Asthma  Thyroid issues  Previous Level of Functional Ability:  2/17/2020  Oral Flake states her right knee was getting worse and worse to walk at times  Her back has been becoming really bad and she now comes her for rehab for her back along with her right knee pain  Inspection / Palpation:  Lumbar:  2/17/2020  Mesomorphic / Endomorphic body type  No signs of infection  No signs of wounds  No signs of drainage  No signs of ecchymotic regions  No signs of erythremic regions  Moderate signs of muscle spasm  Moderate signs of muscle guarding  Moderate to severe signs of tenderness reported to palpation  No signs of swelling  Positive signs of a surgery site  Current conditions appears consistent with recent acute Low Back Pain episode  Chief Complaints:  2/17/2020  Oral Flake reports mild to moderate difficulty with standing  Oral Flake reports mild to moderate difficulty with walking  Oral Flake reports moderate difficulty with movement / use of her lumbar region  Oral Flake reports mild to moderate difficulty with use of stairs  Oral Flake reports severe difficulty with running  Oral Flake reports severe difficulty with jumping  Oral Flake reports moderate difficulty with use of inclines  Oral Flake reports moderate difficulty with sleeping  Oral Flake reports moderate difficulty with her strength and endurance  Oral Flake reports moderate limitations with her range of motion  Oral Flake reports mild to moderate difficulty lying on her lumbar region  Oral Flake reports moderate difficulty twisting / turning her lumbar region      LUMBAR PAIN     Resting Palpation Bending Extending Walking Lifting 2020 4-7 5-9 4-8 5-9 4-8 5-9     LUMBAR PAIN     Pulling Pushing Sleeping Twisting Standing   2020 4-80 4-8 4-8 5-9 4-8     LUMBAR AROM Flexion Extension Rotation Right   2020 45° 5° 35°     LUMBAR AROM Rotation Left Side Bending Right Side Bending Left   2020 34° 37° 36°     LUMBAR MMT / PAIN Flexion Extension Rotation Right   2020 5/10  13 lbs 5/10  14 lbs 4/10  13 lbs     LUMBAR MMT / PAIN Rotation Left Side Bend Right Side Bend Left   2020 5/10  14 lbs 5/10  15 lbs 5/10  15 lbs     LE MMT / PAIN Dorsiflexion Plantarflexion Knee flexion Knee extension   2020 Rt 0/10  25 lbs 0/10  31 lbs 0/10  25 lbs 0/10  26 lbs   2020 Lt 0/10  26 lbs 0/10  32 lbs 0/10  26 lbs 0/10  25 lbs     LE MMT / PAIN Hip Flexion Hip Abduction Hip Adduction   2020  Rt 4/10  18 lbs 4/10  22 lbs 3/10  21 lbs   2020  Lt 4/10  19 lbs 4/10  21 lbs 4/10  22 lbs     LUMBAR SCREEN Referred Pain Sacroiliac Joint test Squish Test Straight Leg  Raise   2020 Rt Negative POSITIVE Negative Negative   2020 Lt Negative POSITIVE Negative Negative     LUMBAR SCREEN Valsalva Gapping Bowstring sign Hip Bursitis   2020 Rt Negative Negative Negative Negative   2020 Lt Negative Negative Negative Negative     Today's date: 2020  Patient name: Tayla Funez  : 1995  MRN: [id number]  Referring provider: Kaushik Garcia MD  Dx:   Encounter Diagnosis     ICD-10-CM    1  Aftercare following surgery Z48 89    2  Acute bilateral low back pain without sciatica M54 5    3  Acute pain of right knee M25 561    4  Difficulty walking R26 2      Subjective:  Aurora Baca states her back is really flared up today  She needs back treatment  Objective: See treatment log below    Assessment: Tolerated treatment well  Patient exhibited good technique with therapeutic exercises and would benefit from continued PT    Plan: Continue per plan of care  Progress treatment as tolerated  Precautions:  Right Knee Pain / Sx Plus Low Back Pain / SIJ Regions      Daily Treatment Log  Manual  2/11 2/13 2/17     MT, ROM 35' 35' 30'     HEP        Exercise Log 2/11 2/13 2/17     Balance Board 30x 30x ea 30x     Chair Squats        P-Bar-GT-Forward, Backward,Side-Even & Dips        BOSU-Walk 3' 3' 3'     Foam Pad SLR,Hip/KneeFl,Step Ups        Foam Beam        T/G-Squats PF 30x 30x ea 30x     W/P-Hip-Abd,Add,Flex,Ext 5#-30x 5# 30x ea 5#-30x     WP-Squats 5#-30x 5# 30x ea 5#-30x     WP-PNF, IR, ER,Pu, PS   5#-30x     Trimax-TKE        Nhlhmng-KH-Vm 2x2' 2x2' 2x2'     NK Table Exer  5# 30x ea 5#-30x     NK Table ROM        TM        Stepper        Bike 10' 10' 10'     ME, PE 15' 15' 15'             Modalities 1/22 2/13 2/17     MH&ES 20'  NT      US

## 2020-02-17 NOTE — PROGRESS NOTES
PT Evaluation   Today's date: 2020  Patient name: Toribio Lopez  : 1995  MRN: [id number]  Referring provider: Beba Todd MD  Dx:   Encounter Diagnosis     ICD-10-CM    1  Aftercare following surgery Z48 89    2  Acute bilateral low back pain without sciatica M54 5    3  Acute pain of right knee M25 561    4  Difficulty walking R26 2      Assessment  Assessment details: Patient states her low back region is really flared up  No history of trauma or injury  Previous back surgery noted  Impairments: abnormal gait, abnormal or restricted ROM, abnormal movement, activity intolerance, impaired balance, impaired physical strength, lacks appropriate home exercise program, pain with function, safety issue and weight-bearing intolerance  Understanding of Dx/Px/POC: excellent   Prognosis: good    Goals  STG  2-4 weeks:   Increase lumbar spine AROM by 2-4 degrees  Increase lower extremity strength by 2-4 lbs in all weak areas  Improve sitting posture and body mechanics  Increase standing/ADL tolerance minutes  Decrease pain by 25-50% with standing, walking, and stairs  Initiate HEP  LTG  6-8 weeks:   Demonstrate normal lumbar rotation  Decrease pain to 1-2/10 with activity  Increase lower extremity strength by 10-20 lbs in all weak areas  Improve spinal stability  Improve gait pattern and balance  Decrease limitations with standing, walking and ADL's  DC with HEP  Alex Vyas Plan  Plan details: All planned modality interventions and planned therapy interventions are provided PRN    Patient would benefit from: PT eval and skilled physical therapy  Planned modality interventions: TENS and unattended electrical stimulation  Planned therapy interventions: joint mobilization, manual therapy, balance, balance/weight bearing training, muscle pump exercises, neuromuscular re-education, postural training, patient education, self care, strengthening, stretching, therapeutic activities, therapeutic exercise, therapeutic training, transfer training, home exercise program, graded exercise, gait training, flexibility and coordination  Frequency: 3x week  Duration in weeks: 12  Treatment plan discussed with: patient      Subjective Evaluation    Pain  Relieving factors: heat, ice, relaxation, rest and support  Aggravating factors: keyboarding, lifting, overhead activity and nothing  Progression: improved    Treatments  Previous treatment: physical therapy  Current treatment: physical therapy  Patient Goals  Patient goals for therapy: decreased pain, improved balance, increased motion, return to work, return to [employer] activities, independence with ADLs/IADLs and increased strength        Objective     Date of onset:  1/2/2020    Date of Surgery:  9/16/2019 Right Knee SX    History of Present Episode: 2/17/2020  Tejal Aguirre states he rack flared up about two to three months ago  She remember no no history of injury or accident  Past Medical History:    2/17/2020  Tejal Aguirre reports right knee issues since age 15  Asthma  Thyroid issues  Previous Level of Functional Ability:  2/17/2020  Tejal Aguirre states her right knee was getting worse and worse to walk at times  Her back has been becoming really bad and she now comes her for rehab for her back along with her right knee pain  Inspection / Palpation:  Lumbar:  2/17/2020  Mesomorphic / Endomorphic body type  No signs of infection  No signs of wounds  No signs of drainage  No signs of ecchymotic regions  No signs of erythremic regions  Moderate signs of muscle spasm  Moderate signs of muscle guarding  Moderate to severe signs of tenderness reported to palpation  No signs of swelling  Positive signs of a surgery site  Current conditions appears consistent with recent acute Low Back Pain episode  Chief Complaints:  2/17/2020  Tejal Yunfuentes reports mild to moderate difficulty with standing  Tejal Aguirre reports mild to moderate difficulty with walking    Tejal Aguirre reports moderate difficulty with movement / use of her lumbar region  Rene Rocha reports mild to moderate difficulty with use of stairs  Rene Rocha reports severe difficulty with running  Rene Rocha reports severe difficulty with jumping  Rene Rocha reports moderate difficulty with use of inclines  Rene Rocha reports moderate difficulty with sleeping  Rene Rocha reports moderate difficulty with her strength and endurance  Rene Rocha reports moderate limitations with her range of motion  Rene Rocha reports mild to moderate difficulty lying on her lumbar region  Rene Rocha reports moderate difficulty twisting / turning her lumbar region      LUMBAR PAIN     Resting Palpation Bending Extending Walking Lifting   2/17/2020 4-7 5-9 4-8 5-9 4-8 5-9     LUMBAR PAIN     Pulling Pushing Sleeping Twisting Standing   2/17/2020 4-80 4-8 4-8 5-9 4-8     LUMBAR AROM Flexion Extension Rotation Right   2/17/2020 45° 5° 35°     LUMBAR AROM Rotation Left Side Bending Right Side Bending Left   2/17/2020 34° 37° 36°     LUMBAR MMT / PAIN Flexion Extension Rotation Right   2/17/2020 5/10  13 lbs 5/10  14 lbs 4/10  13 lbs     LUMBAR MMT / PAIN Rotation Left Side Bend Right Side Bend Left   2/17/2020 5/10  14 lbs 5/10  15 lbs 5/10  15 lbs     LE MMT / PAIN Dorsiflexion Plantarflexion Knee flexion Knee extension   2/17/2020 Rt 0/10  25 lbs 0/10  31 lbs 0/10  25 lbs 0/10  26 lbs   2/17/2020 Lt 0/10  26 lbs 0/10  32 lbs 0/10  26 lbs 0/10  25 lbs     LE MMT / PAIN Hip Flexion Hip Abduction Hip Adduction   2/17/2020  Rt 4/10  18 lbs 4/10  22 lbs 3/10  21 lbs   2/17/2020  Lt 4/10  19 lbs 4/10  21 lbs 4/10  22 lbs     LUMBAR SCREEN Referred Pain Sacroiliac Joint test Squish Test Straight Leg  Raise   2/17/2020 Rt Negative POSITIVE Negative Negative   2/17/2020 Lt Negative POSITIVE Negative Negative     LUMBAR SCREEN Valsalva Gapping Bowstring sign Hip Bursitis   2/17/2020 Rt Negative Negative Negative Negative   2/17/2020 Lt Negative Negative Negative Negative     Today's date: 2020  Patient name: Natalie Alfredo  : 1995  MRN: [id number]  Referring provider: Anthony Castillo MD  Dx:   Encounter Diagnosis     ICD-10-CM    1  Aftercare following surgery Z48 89    2  Acute bilateral low back pain without sciatica M54 5    3  Acute pain of right knee M25 561    4  Difficulty walking R26 2      Subjective:  Lexie Krause states her back is really flared up today  She needs back treatment  Objective: See treatment log below    Assessment: Tolerated treatment well  Patient exhibited good technique with therapeutic exercises and would benefit from continued PT    Plan: Continue per plan of care  Progress treatment as tolerated  Precautions:  Right Knee Pain / Sx Plus Low Back Pain / SIJ Regions      Daily Treatment Log  Manual       MT, ROM 35' 35' 30'     HEP        Exercise Log      Balance Board 30x 30x ea 30x     Chair Squats        P-Bar-GT-Forward, Backward,Side-Even & Dips        BOSU-Walk 3' 3' 3'     Foam Pad SLR,Hip/KneeFl,Step Ups        Foam Beam        T/G-Squats PF 30x 30x ea 30x     W/P-Hip-Abd,Add,Flex,Ext 5#-30x 5# 30x ea 5#-30x     WP-Squats 5#-30x 5# 30x ea 5#-30x     WP-PNF, IR, ER,Pu, PS   5#-30x     Trimax-TKE        Bvthxll-KG-Oj 2x2' 2x2' 2x2'     NK Table Exer  5# 30x ea 5#-30x     NK Table ROM        TM        Stepper        Bike 10' 10' 10'     ME, PE 15' 15' 15'             Modalities      MH&ES 20'  NT      US

## 2020-02-19 ENCOUNTER — OFFICE VISIT (OUTPATIENT)
Dept: PHYSICAL THERAPY | Facility: CLINIC | Age: 25
End: 2020-02-19
Payer: COMMERCIAL

## 2020-02-19 DIAGNOSIS — M54.50 ACUTE BILATERAL LOW BACK PAIN WITHOUT SCIATICA: ICD-10-CM

## 2020-02-19 DIAGNOSIS — Z48.89 AFTERCARE FOLLOWING SURGERY: Primary | ICD-10-CM

## 2020-02-19 DIAGNOSIS — R26.2 DIFFICULTY WALKING: ICD-10-CM

## 2020-02-19 DIAGNOSIS — M25.561 ACUTE PAIN OF RIGHT KNEE: ICD-10-CM

## 2020-02-19 PROCEDURE — 97140 MANUAL THERAPY 1/> REGIONS: CPT

## 2020-02-19 PROCEDURE — 97110 THERAPEUTIC EXERCISES: CPT

## 2020-02-19 PROCEDURE — 97014 ELECTRIC STIMULATION THERAPY: CPT

## 2020-02-19 PROCEDURE — G0283 ELEC STIM OTHER THAN WOUND: HCPCS

## 2020-02-19 NOTE — PROGRESS NOTES
Today's date: 2020  Patient name: Avery Walker  : 1995  MRN: [id number]  Referring provider: Eileen Barrientos MD  Dx:   Encounter Diagnosis     ICD-10-CM    1  Aftercare following surgery Z48 89    2  Acute bilateral low back pain without sciatica M54 5    3  Acute pain of right knee M25 561    4  Difficulty walking R26 2      Subjective:  No new c/o pain today  Objective: See treatment log below    Assessment: Tolerated treatment well  Patient exhibited good technique with therapeutic exercises and would benefit from continued PT to increase ROM/strength and endurance to improve mobility and gait  Plan: Continue per plan of care  Progress treatment as tolerated  Precautions:  Right Knee Pain / Sx Plus Low Back Pain / SIJ Regions      Daily Treatment Log  Manual      MT, ROM 35' 35' 30' 30'    HEP        Exercise Log     Balance Board 30x 30x ea 30x 30x ea    Chair Squats        P-Bar-GT-Forward, Backward,Side-Even & Dips        BOSU-Walk 3' 3' 3' NT    Foam Pad SLR,Hip/KneeFl,Step Ups        Foam Beam        T/G-Squats PF 30x 30x ea 30x 30x ea    W/P-Hip-Abd,Add,Flex,Ext 5#-30x 5# 30x ea 5#-30x 5# 30x ea    WP-Squats 5#-30x 5# 30x ea 5#-30x 5# 30x     WP-PNF, IR, ER,Pu, PS   5#-30x 5# 30x ea    Trimax-TKE        Vwwurnc-ZJ-Le 2x2' 2x2' 2x2' 2x2'    NK Table Exer  5# 30x ea 5#-30x 5# 30x ea    NK Table ROM        TM        Stepper        Bike 10' 10' 10' 10'    ME, PE 15' 15' 15' 15'            Modalities     MH&ES 20'  NT  20'    US

## 2020-02-24 ENCOUNTER — OFFICE VISIT (OUTPATIENT)
Dept: PHYSICAL THERAPY | Facility: CLINIC | Age: 25
End: 2020-02-24
Payer: COMMERCIAL

## 2020-02-24 DIAGNOSIS — M25.561 ACUTE PAIN OF RIGHT KNEE: ICD-10-CM

## 2020-02-24 DIAGNOSIS — R26.2 DIFFICULTY WALKING: ICD-10-CM

## 2020-02-24 DIAGNOSIS — M54.50 ACUTE BILATERAL LOW BACK PAIN WITHOUT SCIATICA: ICD-10-CM

## 2020-02-24 DIAGNOSIS — Z48.89 AFTERCARE FOLLOWING SURGERY: Primary | ICD-10-CM

## 2020-02-24 PROCEDURE — 97140 MANUAL THERAPY 1/> REGIONS: CPT | Performed by: PHYSICAL THERAPIST

## 2020-02-24 PROCEDURE — 97110 THERAPEUTIC EXERCISES: CPT | Performed by: PHYSICAL THERAPIST

## 2020-02-24 PROCEDURE — G0283 ELEC STIM OTHER THAN WOUND: HCPCS | Performed by: PHYSICAL THERAPIST

## 2020-02-24 PROCEDURE — 97112 NEUROMUSCULAR REEDUCATION: CPT | Performed by: PHYSICAL THERAPIST

## 2020-02-24 PROCEDURE — 97164 PT RE-EVAL EST PLAN CARE: CPT | Performed by: PHYSICAL THERAPIST

## 2020-02-24 PROCEDURE — 97014 ELECTRIC STIMULATION THERAPY: CPT | Performed by: PHYSICAL THERAPIST

## 2020-02-24 PROCEDURE — 97035 APP MDLTY 1+ULTRASOUND EA 15: CPT | Performed by: PHYSICAL THERAPIST

## 2020-02-24 NOTE — PROGRESS NOTES
PT Re-Evaluation   Today's date: 2020      Patient name: Servando Celaya  : 1995  MRN: [id number]  Referring provider: Parth Young MD  Dx:   Encounter Diagnosis     ICD-10-CM    1  Aftercare following surgery Z48 89    2  Acute bilateral low back pain without sciatica M54 5    3  Acute pain of right knee M25 561    4  Difficulty walking R26 2      Assessment  Assessment details: Patient states her knee finally feels pretty good but her low back region is really painful now  Her low back is her chief compliant at this time  Impairments: abnormal gait, abnormal or restricted ROM, abnormal movement, activity intolerance, impaired balance, impaired physical strength, lacks appropriate home exercise program, pain with function, safety issue, weight-bearing intolerance and poor posture   Understanding of Dx/Px/POC: excellent   Prognosis: good    Goals  STG  2-4 weeks:   Increase lumbar spine AROM by 2-4 degrees  Increase lower extremity strength by 2-4 lbs in all weak areas  Improve sitting posture and body mechanics  Increase standing/ADL tolerance minutes  Decrease pain by 25-50% with standing, walking, and stairs  Initiate HEP  LTG  6-8 weeks:   Demonstrate normal lumbar rotation  Decrease pain to 1-2/10 with activity  Increase lower extremity strength by 10-20 lbs in all weak areas  Improve spinal stability  Improve gait pattern and balance  Decrease limitations with standing, walking and ADL's  DC with HEP  Plan  Plan details: All planned modality interventions and planned therapy interventions are provided PRN    Patient would benefit from: PT eval and skilled physical therapy  Planned modality interventions: ultrasound, unattended electrical stimulation and TENS  Planned therapy interventions: joint mobilization, manual therapy, neuromuscular re-education, patient education, self care, strengthening, stretching, therapeutic activities, therapeutic exercise, therapeutic training, transfer training, coordination, balance, balance/weight bearing training, flexibility, gait training, graded exercise and home exercise program  Frequency: 3x week  Duration in weeks: 12  Treatment plan discussed with: patient      Subjective Evaluation    Pain  Quality: discomfort, knife-like, pulling, squeezing, sharp, tight and throbbing  Relieving factors: relaxation, rest, support, medications, heat and change in position  Aggravating factors: lifting, running, stair climbing, sitting, standing and walking  Progression: worsening    Treatments  Previous treatment: physical therapy  Current treatment: physical therapy  Patient Goals  Patient goals for therapy: decreased pain, increased motion, return to work, return to [employer] activities, independence with ADLs/IADLs, increased strength and improved balance        Objective     Date of onset:  9/16/2019    Date of Surgery:  9/16/2019    History of Present Episode: 10/7/2019  Dharmesh Hatfield states she has had issues with her right knee for several years  She finally went for surgery since she fell recently on her right knee and was in severe pain  Past Medical History:    10/7/2019  Dharmesh Hatfield reports right knee issues since age 15  Asthma  Thyroid issues  Previous Level of Functional Ability:  10/7/2019  Dharmesh Hatfield states her right knee was getting worse and worse to walk at times  Inspection / Palpation:  Knee:  10/7/2019  Mesomorphic / Endomorphic body type  Moderate to severe signs of infection  No signs of wounds  No signs of drainage  Mild to moderate signs of ecchymotic regions  Mild to moderate signs of erythremic regions  Moderate signs of muscle spasm  Moderate signs of muscle guarding  Moderate signs of tenderness reported to palpation  Moderate signs of swelling  Positive signs of a surgery site  Current conditions appears consistent with recent acute surgery recovery episode      Chief Complaints:  10/7/2019  Dharmesh Hatfield reports moderate to severe difficulty with standing  Neelam Morgan reports moderate to severe difficulty with walking  Neelam Morgan reports moderate to severe difficulty with movement / use of her right knee  Neelam Morgan reports severe difficulty with use of stairs  Neelam Morgan reports severe difficulty with running  Neelam Morgan reports severe difficulty with jumping  Neelam Morgan reports moderate to severe difficulty with use of inclines  Neelam Morgan reports moderate difficulty with sleeping  Neelam Morgan reports moderate difficulty with her strength and endurance  Neelam Morgan reports moderate to severe limitations with her range of motion  Neelam Morgan reports moderate to severe difficulty lying on her right knee region      KNEE PAIN Resting Moving Palpation Standing Walking   10/7/2019 Lt 0 0 0 0 0   10/7/2019 Rt 0-3 2-6 4-7 3-7 3-7   11/13/2019 Rt 0-2 1-4 2-5 2-5 2-5   11/15/2019 Rt 0-2 1-4 2-5 2-5 2-5   12/16/2019 Rt 0-1 1-3 1-4 1-4 1-4   1/22/2020 Rt 0-1 1-2 1-2 1-2 1-2     LUMBAR PAIN     Resting Palpation Bending Extending Walking Lifting   2/24/2020 5-7 5-8 6-8 5-8 4-8 4-9     LUMBAR PAIN     Pulling Pushing Sleeping Twisting Standing   2/24/2020 4-8 4-8 4-8 4-8 4-8     KNEE PAIN Running Stairs Sleeping Twisting Jumping   10/7/2019 Lt 0 0 0 0 0   10/7/2019 Rt NA NA 3-6 4-7 NA   11/13/2019 Rt NA NA 1-4 2-5 NA   11/15/2019 Rt NA NA 1-4 2-5 NA   12/16/2019 Rt NA NA 1-3 1-4 NA   1/22/2020 Rt 3-6 2-5 1-2 1-2 3-6     KNEE AROM Flexion Extension SLR   10/7/2019 Lt 140° 0° 95°   10/7/2019 Rt 65° 0° 74°   11/13/2019 Rt 95° 0° 82°   11/15/2019 Rt 95° 0° 82°   12/16/2109 Rt 121° 0° 88°   1/22/2020 Rt 129° 0° 92°     LUMBAR AROM Flexion Extension Rotation Right   2/24/2020 45° 8° 43°     LUMBAR AROM Rotation Left Side Bending Right Side Bending Left   2/24/2020 44° 41° 40°     KNEE MMT Flexion Extension Varus Stress Valgus Stress   10/7/2019 Lt 0/10  35 lbs 0/10  33 lbs 0/10  29 lbs 0/10  30 lbs   10/7/2019 Rt 5/10  7 lbs 5/10  6 lbs 5/10  5 lbs 6/10  6 lbs   11/13/2019 Rt 4/10  12 lbs 4/10  11 lbs 4/10  10 lbs 4/10  11 lbs   11/15/2019 Rt 4/10  12 lbs 4/10  11 lbs 4/10  10 lbs 4/10  11 lbs   2019 Rt 3/10  16 lbs 2/10  17 lbs 2/10  16 lbs 2/10  15 lbs   2020 Rt 2/10  20 lbs 1/10  19 lbs 1/10  18 lbs 1/10  19 lbs     LUMBAR MMT / PAIN Flexion Extension Rotation Right   202010  11 lbs 10  13 lbs 5/10  11 lbs     LUMBAR MMT / PAIN Rotation Left Side Bend Right Side Bend Left   2020 5/10  12 lbs 5/10  10 lbs 5/10  11 lbs     Precautions: Right Knee Surgery and Low Back Pain    Today's date: 2020  Patient name: Anastasia Morales  : 1995  MRN: [id number]  Referring provider: Jazz White MD  Dx:   Encounter Diagnosis     ICD-10-CM    1  Aftercare following surgery Z48 89    2  Acute bilateral low back pain without sciatica M54 5    3  Acute pain of right knee M25 561    4  Difficulty walking R26 2      Subjective:  Ekaterina Edmond states her back is really sore today  She is having a hard time moving  Significant muscle spasm with her low back region and into her SIJ regions  Objective: See treatment log below    Assessment: Tolerated treatment well  Patient exhibited good technique with therapeutic exercises and would benefit from continued PT    Plan: Continue per plan of care  Progress treatment as tolerated  Precautions:  Right Knee Pain / Sx Plus Low Back Pain / SIJ Regions      Daily Treatment Log  Manual     MT, ROM 35' 35' 30' 30' 30'   HEP        Exercise Log    Balance Board 30x 30x ea 30x 30x ea 30x   Chair Squats        P-Bar-GT-Forward, Backward,Side-Even & Dips        BOSU-Walk 3' 3' 3' NT 5'   Foam Pad SLR,Hip/KneeFl,Step Ups        Foam Beam        T/G-Squats PF 30x 30x ea 30x 30x ea 30x   W/P-Hip-Abd,Add,Flex,Ext 5#-30x 5# 30x ea 5#-30x 5# 30x ea 5#0-30x   WP-Squats 5#-30x 5# 30x ea 5#-30x 5# 30x  5#-30x   WP-PNF, IR, ER,Pu, PS   5#-30x 5# 30x ea 5#-30x   Trimax-TKE Vapdfnc-ZP-Ul 2x2' 2x2' 2x2' 2x2' 2x2'   NK Table Exer  5# 30x ea 5#-30x 5# 30x ea 5#-30x   NK Table ROM        TM        Stepper        Bike 10' 10' 10' 10' 10'   ME, PE 13' 15' 15' 15' 15'           Modalities 1/22 2/13 2/17 2/19 2/24   MH&ES 20'  NT  20' 20'   US     10'

## 2020-02-24 NOTE — LETTER
2020  PT Reevaluation Padmini Michelle MD  [facility]  [address]    Patient: Jeffrey Higgins   YOB: 1995   Date of Visit: 2020     Encounter Diagnosis     ICD-10-CM    1  Aftercare following surgery Z48 89    2  Acute bilateral low back pain without sciatica M54 5    3  Acute pain of right knee M25 561    4  Difficulty walking R26 2      Dear Dr Yulisa Stanley:  Thank you for your recent referral of Jeffrey Higgins  Please review the attached evaluation summary from [facility] recent visit  Please verify that you agree with the plan of care by signing the attached order  If you have any questions or concerns, please do not hesitate to call  I sincerely appreciate the opportunity to share in the care of one of your patients and hope to have another opportunity to work with you in the near future  Sincerely,    Mel Peña, PT    Referring Provider:      I certify that I have read the below Plan of Care and certify the need for these services furnished under this plan of treatment while under my care  Cheri Bowman MD  [address] In Basket    Please SIGN ABOVE and return THIS PAGE ONLY to Fax # [phone]        PT Re-Evaluation   Today's date: 2020      Patient name: Jeffrey Higgins  : 1995  MRN: [id number]  Referring provider: Danielle Roa MD  Dx:   Encounter Diagnosis     ICD-10-CM    1  Aftercare following surgery Z48 89    2  Acute bilateral low back pain without sciatica M54 5    3  Acute pain of right knee M25 561    4  Difficulty walking R26 2      Assessment  Assessment details: Patient states her knee finally feels pretty good but her low back region is really painful now  Her low back is her chief compliant at this time    Impairments: abnormal gait, abnormal or restricted ROM, abnormal movement, activity intolerance, impaired balance, impaired physical strength, lacks appropriate home exercise program, pain with function, safety issue, weight-bearing intolerance and poor posture   Understanding of Dx/Px/POC: excellent   Prognosis: good    Goals  STG  2-4 weeks:   Increase lumbar spine AROM by 2-4 degrees  Increase lower extremity strength by 2-4 lbs in all weak areas  Improve sitting posture and body mechanics  Increase standing/ADL tolerance minutes  Decrease pain by 25-50% with standing, walking, and stairs  Initiate HEP  LTG  6-8 weeks:   Demonstrate normal lumbar rotation  Decrease pain to 1-2/10 with activity  Increase lower extremity strength by 10-20 lbs in all weak areas  Improve spinal stability  Improve gait pattern and balance  Decrease limitations with standing, walking and ADL's  DC with HEP  Plan  Plan details: All planned modality interventions and planned therapy interventions are provided PRN    Patient would benefit from: PT eval and skilled physical therapy  Planned modality interventions: ultrasound, unattended electrical stimulation and TENS  Planned therapy interventions: joint mobilization, manual therapy, neuromuscular re-education, patient education, self care, strengthening, stretching, therapeutic activities, therapeutic exercise, therapeutic training, transfer training, coordination, balance, balance/weight bearing training, flexibility, gait training, graded exercise and home exercise program  Frequency: 3x week  Duration in weeks: 12  Treatment plan discussed with: patient      Subjective Evaluation    Pain  Quality: discomfort, knife-like, pulling, squeezing, sharp, tight and throbbing  Relieving factors: relaxation, rest, support, medications, heat and change in position  Aggravating factors: lifting, running, stair climbing, sitting, standing and walking  Progression: worsening    Treatments  Previous treatment: physical therapy  Current treatment: physical therapy  Patient Goals  Patient goals for therapy: decreased pain, increased motion, return to work, return to [employer] activities, independence with ADLs/IADLs, increased strength and improved balance        Objective     Date of onset:  9/16/2019    Date of Surgery:  9/16/2019    History of Present Episode: 10/7/2019  Iam Campos states she has had issues with her right knee for several years  She finally went for surgery since she fell recently on her right knee and was in severe pain  Past Medical History:    10/7/2019  Iam Campos reports right knee issues since age 15  Asthma  Thyroid issues  Previous Level of Functional Ability:  10/7/2019  Iam Campos states her right knee was getting worse and worse to walk at times  Inspection / Palpation:  Knee:  10/7/2019  Mesomorphic / Endomorphic body type  Moderate to severe signs of infection  No signs of wounds  No signs of drainage  Mild to moderate signs of ecchymotic regions  Mild to moderate signs of erythremic regions  Moderate signs of muscle spasm  Moderate signs of muscle guarding  Moderate signs of tenderness reported to palpation  Moderate signs of swelling  Positive signs of a surgery site  Current conditions appears consistent with recent acute surgery recovery episode  Chief Complaints:  10/7/2019  Iam Campos reports moderate to severe difficulty with standing  Iam Campos reports moderate to severe difficulty with walking  Iam Campos reports moderate to severe difficulty with movement / use of her right knee  Iam Campos reports severe difficulty with use of stairs  Iam Campos reports severe difficulty with running  Iam Campos reports severe difficulty with jumping  Iam Campos reports moderate to severe difficulty with use of inclines  Iam Campos reports moderate difficulty with sleeping  Iam Campos reports moderate difficulty with her strength and endurance  Iam Campos reports moderate to severe limitations with her range of motion  Iam Campos reports moderate to severe difficulty lying on her right knee region      KNEE PAIN Resting Moving Palpation Standing Walking   10/7/2019 Lt 0 0 0 0 0   10/7/2019 Rt 0-3 2-6 4-7 3-7 3-7   2019 Rt 0-2 1-4 2-5 2-5 2-5   11/15/2019 Rt 0-2 1-4 2-5 2-5 2-5   2019 Rt 0-1 1-3 1-4 1-4 1-4   2020 Rt 0-1 1-2 1-2 1-2 1-2     LUMBAR PAIN     Resting Palpation Bending Extending Walking Lifting   2020 5-7 5-8 6-8 5-8 4-8 4-9     LUMBAR PAIN     Pulling Pushing Sleeping Twisting Standing   2020 4-8 4-8 4-8 4-8 4-8     KNEE PAIN Running Stairs Sleeping Twisting Jumping   10/7/2019 Lt 0 0 0 0 0   10/7/2019 Rt NA NA 3-6 4-7 NA   2019 Rt NA NA 1-4 2-5 NA   11/15/2019 Rt NA NA 1-4 2-5 NA   2019 Rt NA NA 1-3 1-4 NA   2020 Rt 3-6 2-5 1-2 1-2 3-6     KNEE AROM Flexion Extension SLR   10/7/2019 Lt 140° 0° 95°   10/7/2019 Rt 65° 0° 74°   2019 Rt 95° 0° 82°   11/15/2019 Rt 95° 0° 82°   2109 Rt 121° 0° 88°   2020 Rt 129° 0° 92°     LUMBAR AROM Flexion Extension Rotation Right   2020 45° 8° 43°     LUMBAR AROM Rotation Left Side Bending Right Side Bending Left   2020 44° 41° 40°     KNEE MMT Flexion Extension Varus Stress Valgus Stress   10/7/2019 Lt 0/10  35 lbs 010  33 lbs 010  29 lbs 010  30 lbs   10/7/2019 Rt 510  7 lbs 510  6 lbs 5/10  5 lbs 10  6 lbs   2019 Rt 4/10  12 lbs 4/10  11 lbs 4/10  10 lbs 10  11 lbs   11/15/2019 Rt 4/10  12 lbs 410  11 lbs 4/10  10 lbs 4/10  11 lbs   2019 Rt 3/10  16 lbs 2/10  17 lbs 2/10  16 lbs 2/10  15 lbs   2020 Rt 2/10  20 lbs 1/10  19 lbs 1/10  18 lbs 1/10  19 lbs     LUMBAR MMT / PAIN Flexion Extension Rotation Right   2020 5/10  11 lbs 5/10  13 lbs 5/10  11 lbs     LUMBAR MMT / PAIN Rotation Left Side Bend Right Side Bend Left   2020 5/10  12 lbs 5/10  10 lbs 5/10  11 lbs     Precautions: Right Knee Surgery and Low Back Pain    Today's date: 2020  Patient name: Hellen Wright  : 1995  MRN: [id number]  Referring provider: Ro Fine MD  Dx:   Encounter Diagnosis ICD-10-CM    1  Aftercare following surgery Z48 89    2  Acute bilateral low back pain without sciatica M54 5    3  Acute pain of right knee M25 561    4  Difficulty walking R26 2      Subjective:  Deepak Prince states her back is really sore today  She is having a hard time moving  Significant muscle spasm with her low back region and into her SIJ regions  Objective: See treatment log below    Assessment: Tolerated treatment well  Patient exhibited good technique with therapeutic exercises and would benefit from continued PT    Plan: Continue per plan of care  Progress treatment as tolerated  Precautions:  Right Knee Pain / Sx Plus Low Back Pain / SIJ Regions      Daily Treatment Log  Manual  2/11 2/13 2/17 2/19 2/24   MT, ROM 35' 35' 30' 30' 30'   HEP        Exercise Log 2/11 2/13 2/17 2/19 2/24   Balance Board 30x 30x ea 30x 30x ea 30x   Chair Squats        P-Bar-GT-Forward, Backward,Side-Even & Dips        BOSU-Walk 3' 3' 3' NT 5'   Foam Pad SLR,Hip/KneeFl,Step Ups        Foam Beam        T/G-Squats PF 30x 30x ea 30x 30x ea 30x   W/P-Hip-Abd,Add,Flex,Ext 5#-30x 5# 30x ea 5#-30x 5# 30x ea 5#0-30x   WP-Squats 5#-30x 5# 30x ea 5#-30x 5# 30x  5#-30x   WP-PNF, IR, ER,Pu, PS   5#-30x 5# 30x ea 5#-30x   Trimax-TKE        Iqlevgs-GW-Ke 2x2' 2x2' 2x2' 2x2' 2x2'   NK Table Exer  5# 30x ea 5#-30x 5# 30x ea 5#-30x   NK Table ROM        TM        Stepper        Bike 10' 10' 10' 10' 10'   ME, PE 15' 15' 15' 15' 15'           Modalities 1/22 2/13 2/17 2/19 2/24   MH&ES 20'  NT  20' 20'   US     10'

## 2020-02-26 ENCOUNTER — OFFICE VISIT (OUTPATIENT)
Dept: PHYSICAL THERAPY | Facility: CLINIC | Age: 25
End: 2020-02-26
Payer: COMMERCIAL

## 2020-02-26 DIAGNOSIS — M54.50 ACUTE BILATERAL LOW BACK PAIN WITHOUT SCIATICA: ICD-10-CM

## 2020-02-26 DIAGNOSIS — M25.561 ACUTE PAIN OF RIGHT KNEE: ICD-10-CM

## 2020-02-26 DIAGNOSIS — Z48.89 AFTERCARE FOLLOWING SURGERY: Primary | ICD-10-CM

## 2020-02-26 DIAGNOSIS — R26.2 DIFFICULTY WALKING: ICD-10-CM

## 2020-02-26 PROCEDURE — 97140 MANUAL THERAPY 1/> REGIONS: CPT

## 2020-02-26 PROCEDURE — 97110 THERAPEUTIC EXERCISES: CPT

## 2020-02-26 PROCEDURE — 97014 ELECTRIC STIMULATION THERAPY: CPT

## 2020-02-26 PROCEDURE — G0283 ELEC STIM OTHER THAN WOUND: HCPCS

## 2020-02-26 NOTE — PROGRESS NOTES
Today's date: 2020  Patient name: Bryce Sue  : 1995  MRN: [id number]  Referring provider: Ced Reddy MD  Dx:   Encounter Diagnosis     ICD-10-CM    1  Aftercare following surgery Z48 89    2  Acute bilateral low back pain without sciatica M54 5    3  Acute pain of right knee M25 561    4  Difficulty walking R26 2      Subjective:  No new c/o pain today  Objective: See treatment log below    Assessment: Tolerated treatment well  Patient exhibited good technique with therapeutic exercises and would benefit from continued PT to increase ROM/strength and endurance to improve mobility and gait  Plan: Continue per plan of care  Progress treatment as tolerated  Precautions:  Right Knee Pain / Sx Plus Low Back Pain / SIJ Regions      Daily Treatment Log  Manual     MT, ROM 45'    30'   HEP        Exercise Log    Balance Board 30x    30x   Chair Squats        P-Bar-GT-Forward, Backward,Side-Even & Dips        BOSU-Walk 5'    5'   Foam Pad SLR,Hip/KneeFl,Step Ups        Foam Beam        T/G-Squats PF 30x    30x   W/P-Hip-Abd,Add,Flex,Ext 10#-30x    5#0-30x   WP-Squats NT    5#-30x   WP-PNF, IR, ER,Pu, PS 10# 30x ea    5#-30x   Zcdoeuz-PD-Ql 2x2'    2x2'   NK Table Exer 5# 30x ea    5#-30x   NK Table ROM        TM        Stepper        Bike 10'    10'   ME, PE 15'    15'           Modalities    MH&ES 20'    20'   US     10'

## 2020-03-03 ENCOUNTER — OFFICE VISIT (OUTPATIENT)
Dept: PHYSICAL THERAPY | Facility: CLINIC | Age: 25
End: 2020-03-03
Payer: COMMERCIAL

## 2020-03-03 DIAGNOSIS — M54.50 ACUTE BILATERAL LOW BACK PAIN WITHOUT SCIATICA: ICD-10-CM

## 2020-03-03 DIAGNOSIS — R26.2 DIFFICULTY WALKING: ICD-10-CM

## 2020-03-03 DIAGNOSIS — M25.561 ACUTE PAIN OF RIGHT KNEE: ICD-10-CM

## 2020-03-03 DIAGNOSIS — Z48.89 AFTERCARE FOLLOWING SURGERY: Primary | ICD-10-CM

## 2020-03-03 PROCEDURE — 97112 NEUROMUSCULAR REEDUCATION: CPT | Performed by: PHYSICAL THERAPIST

## 2020-03-03 PROCEDURE — 97110 THERAPEUTIC EXERCISES: CPT | Performed by: PHYSICAL THERAPIST

## 2020-03-03 NOTE — PROGRESS NOTES
Today's date: 3/3/2020  Patient name: Cassandra Winters  : 1995  MRN: [id number]  Referring provider: Jovanna Madrigal MD  Dx:   Encounter Diagnosis     ICD-10-CM    1  Aftercare following surgery Z48 89    2  Acute bilateral low back pain without sciatica M54 5    3  Acute pain of right knee M25 561    4  Difficulty walking R26 2      Subjective:  Chito Randolph states her right knee is feeling better  Se is standing and walking better  Objective: See treatment log below    Assessment: Tolerated treatment well  Patient exhibited good technique with therapeutic exercises and would benefit from continued PT    Plan: Continue per plan of care  Progress treatment as tolerated  Precautions:  Right Knee Pain / Sx Plus Low Back Pain / SIJ Regions      Daily Treatment Log  Manual  2/26 3/3   2/24   MT, ROM 45'    30'   HEP        Exercise Log 2/26 3/3   2/24   Balance Board 30x 30x   30x   Chair Squats        P-Bar-GT-Forward, Backward,Side-Even & Dips        BOSU-Walk 5' 5'   5'   Foam Pad SLR,Hip/KneeFl,Step Ups        Foam Beam        T/G-Squats PF 30x 30x   30x   W/P-Hip-Abd,Add,Flex,Ext 10#-30x 10#-30x   5#0-30x   WP-Squats NT    5#-30x   WP-PNF, IR, ER,Pu, PS 10# 30x ea 10#-30x   5#-30x   Ihakqzy-HS-Dq 2x2' 2x2'   2x2'   NK Table Exer 5# 30x ea 5#-30x   5#-30x   NK Table ROM        TM        Stepper        Bike 10' 10'   10'   ME, PE 15' 15'   15'           Modalities 2/26 3/3   2/24   MH&ES 20'    20'   US     10'

## 2020-03-06 ENCOUNTER — APPOINTMENT (OUTPATIENT)
Dept: PHYSICAL THERAPY | Facility: CLINIC | Age: 25
End: 2020-03-06
Payer: COMMERCIAL

## 2020-03-09 ENCOUNTER — APPOINTMENT (OUTPATIENT)
Dept: PHYSICAL THERAPY | Facility: CLINIC | Age: 25
End: 2020-03-09
Payer: COMMERCIAL

## 2020-03-11 ENCOUNTER — OFFICE VISIT (OUTPATIENT)
Dept: PHYSICAL THERAPY | Facility: CLINIC | Age: 25
End: 2020-03-11
Payer: COMMERCIAL

## 2020-03-11 ENCOUNTER — OFFICE VISIT (OUTPATIENT)
Dept: OBGYN CLINIC | Facility: CLINIC | Age: 25
End: 2020-03-11
Payer: COMMERCIAL

## 2020-03-11 VITALS
HEART RATE: 105 BPM | WEIGHT: 191 LBS | SYSTOLIC BLOOD PRESSURE: 116 MMHG | BODY MASS INDEX: 32.61 KG/M2 | DIASTOLIC BLOOD PRESSURE: 78 MMHG | RESPIRATION RATE: 18 BRPM | HEIGHT: 64 IN

## 2020-03-11 DIAGNOSIS — M25.561 ACUTE PAIN OF RIGHT KNEE: ICD-10-CM

## 2020-03-11 DIAGNOSIS — S83.004D CLOSED DISLOCATION OF RIGHT PATELLA, SUBSEQUENT ENCOUNTER: ICD-10-CM

## 2020-03-11 DIAGNOSIS — Z09 POSTOP CHECK: Primary | ICD-10-CM

## 2020-03-11 DIAGNOSIS — M54.50 ACUTE BILATERAL LOW BACK PAIN WITHOUT SCIATICA: ICD-10-CM

## 2020-03-11 DIAGNOSIS — Z48.89 AFTERCARE FOLLOWING SURGERY: ICD-10-CM

## 2020-03-11 DIAGNOSIS — Z48.89 AFTERCARE FOLLOWING SURGERY: Primary | ICD-10-CM

## 2020-03-11 PROCEDURE — 99213 OFFICE O/P EST LOW 20 MIN: CPT | Performed by: ORTHOPAEDIC SURGERY

## 2020-03-11 PROCEDURE — 97140 MANUAL THERAPY 1/> REGIONS: CPT

## 2020-03-11 PROCEDURE — G0283 ELEC STIM OTHER THAN WOUND: HCPCS

## 2020-03-11 PROCEDURE — 97014 ELECTRIC STIMULATION THERAPY: CPT

## 2020-03-11 PROCEDURE — 97110 THERAPEUTIC EXERCISES: CPT

## 2020-03-11 NOTE — PROGRESS NOTES
Daily Note     Today's date: 3/11/2020  Patient name: Suhail Dowling  : 1995  MRN: [id number]  Referring provider: Tai Berrios MD  Dx:   Encounter Diagnosis     ICD-10-CM    1  Aftercare following surgery Z48 89    2  Acute bilateral low back pain without sciatica M54 5    3  Acute pain of right knee M25 561                   Subjective: *Im ok, just sore"      Objective: See treatment diary below      Assessment: Tolerated treatment well  Patient exhibited good technique with therapeutic exercises     Pt reports relief with DTM and modalities  Plan: Continue per plan of care        Manual  2/26 3/3 3/11/2020  2/24   MT, ROM 45'  30  30'   HEP        Exercise Log 2/26 3/3 3/3  2/24   Balance Board 30x 30x 30x  30x   Chair Squats        P-Bar-GT-Forward, Backward,Side-Even & Dips        BOSU-Walk 5' 5' 5'  5'   Foam Pad SLR,Hip/KneeFl,Step Ups        Foam Beam        T/G-Squats PF 30x 30x 30x  30x   W/P-Hip-Abd,Add,Flex,Ext 10#-30x 10#-30x 10#  30x  5#0-30x   WP-Squats NT    5#-30x   WP-PNF, IR, ER,Pu, PS 10# 30x ea 10#-30x 10#  30x  5#-30x   Qldbzfl-WZ-Iz 2x2' 2x2' 2x2'  2x2'   NK Table Exer 5# 30x ea 5#-30x 5#  30x  5#-30x   NK Table ROM        TM        Stepper        Bike 10' 10' 10'  10'   ME, PE 15' 15' 15'  15'           Modalities 2/26 3/3 3/3  2/24   MH&ES 20'  20'  20'   US     10'

## 2020-03-11 NOTE — PROGRESS NOTES
Chief Complaint  Right knee MPFL reconstruction for recurrent dislocation patella    History Of Presenting Illness  Olinda Mendez 1995 presents with  Status post surgery on the right knee with MPFL reconstruction on September 16th, 2019  Patient presents for postop evaluation  Patient had no further rectus of her dislocation or instability  Patient's main complaint is difficulty at times with uneven ground or stairs  Patient is continuing physical therapy and home exercise program   No further episodes of giving way of the knee      Current Medications  Current Outpatient Medications   Medication Sig Dispense Refill    albuterol (PROVENTIL HFA,VENTOLIN HFA) 90 mcg/act inhaler Inhale 2 puffs every 6 (six) hours as needed for wheezing      citalopram (CeleXA) 20 mg tablet Take 40 mg by mouth daily       fluticasone (FLOVENT HFA) 110 MCG/ACT inhaler Inhale 2 puffs 2 (two) times a day Rinse mouth after use   levothyroxine 100 mcg tablet Take 100 mcg by mouth daily      norgestimate-ethinyl estradiol (ORTHO-CYCLEN) 0 25-35 MG-MCG per tablet Take 1 tablet by mouth daily      QUEtiapine (SEROquel) 200 mg tablet Take 250 mg by mouth daily at bedtime       ibuprofen (MOTRIN) 800 mg tablet Take 1 tablet (800 mg total) by mouth every 8 (eight) hours for 5 days 15 tablet 0     No current facility-administered medications for this visit          Current Problems    Active Problems:   Patient Active Problem List    Diagnosis Date Noted    Painful scar 11/06/2019    Closed dislocation of right patella 09/04/2019         Review of Systems:    General: negative for - chills, fatigue, fever,  weight gain or weight loss  Psychological: negative for - anxiety, behavioral disorder, concentration difficulties  Ophthalmic: negative for - blurry vision, decreased vision, double vision,      Past Medical History:   Past Medical History:   Diagnosis Date    Asthma     Bipolar 1 disorder ([facility] [address] )     Depression     Disease of thyroid gland     Nerve disorder     Pet allergy     Seasonal allergies        Past Surgical History:   Past Surgical History:   Procedure Laterality Date    BACK SURGERY      KNEE ARTHROSCOPY Right     KNEE ARTHROSCOPY W/ ACL RECONSTRUCTION      MS KNEE SCOPE,FULL SYNOVECT Right 9/16/2019    Procedure: ARTHROSCOPY KNEE  arthroscopy of right knee 1st, mini open right MPFL reconstruction with allograft;  Surgeon: Allegra Coronado MD;  Location: [facility];  Service: Orthopedics    REVISION OF SCAR Right 11/18/2019    Procedure: REVISION SCAR EXTREMITY;  Surgeon: Allegra Coronado MD;  Location: [facility];  Service: Orthopedics    WISDOM TOOTH EXTRACTION         Family History:  Family history reviewed and non-contributory  Family History   Problem Relation Age of Onset    Pneumonia Mother     Multiple sclerosis Father        Social History:  Social History     Socioeconomic History    Marital status: Single     Spouse name: None    Number of children: None    Years of education: None    Highest education level: None   Occupational History    None   Social Needs    Financial resource strain: None    Food insecurity:     Worry: None     Inability: None    Transportation needs:     Medical: None     Non-medical: None   Tobacco Use    Smoking status: Current Every Day Smoker     Types: E-Cigarettes    Smokeless tobacco: Current User    Tobacco comment: vapes   Substance and Sexual Activity    Alcohol use: Not Currently    Drug use: Yes     Types: Marijuana     Comment: has not used for 3 m,[facility]    Sexual activity: None   Lifestyle    Physical activity:     Days per week: None     Minutes per session: None    Stress: None   Relationships    Social connections:     Talks on phone: None     Gets together: None     Attends Zoroastrianism service: None     Active member of club or organization: None     Attends meetings of clubs or organizations: None     Relationship status: None    Intimate partner violence:     Fear of current or ex partner: None     Emotionally abused: None     Physically abused: None     Forced sexual activity: None   Other Topics Concern    None   Social History Narrative    None       Allergies:   No Known Allergies        Physical ExaminationBP 116/78 (BP Location: Left arm, Patient Position: Sitting, Cuff Size: Large)   Pulse 105   Resp 18   Ht 5' 4" (1 626 m)   Wt 86 6 kg (191 lb)   BMI 32 79 kg/m²   Gen: Alert and oriented to person, place, time  HEENT: EOMI, eyes clear, moist mucus membranes, hearing intact      Orthopedic Exam   right knee incision healed patella tracks well no effusion Full extension full flexion patellar feels stable apprehension negative          Impression   stable status post right patella MPFL reconstruction      Plan     patient allowed all activities as tolerated   continue physical therapy for another 4-6 weeks and can be discharged to home exercise program with VMO strengthening   patient will follow up p r n  X-rays on arrival if patient does come back AP lateral and skyline    Kaushik Mares MD        Portions of the record may have been created with voice recognition software  Occasional wrong word or "sound a like" substitutions may have occurred due to the inherent limitations of voice recognition software  Read the chart carefully and recognize, using context, where substitutions have occurred

## 2020-03-17 ENCOUNTER — OFFICE VISIT (OUTPATIENT)
Dept: PHYSICAL THERAPY | Facility: CLINIC | Age: 25
End: 2020-03-17
Payer: COMMERCIAL

## 2020-03-17 DIAGNOSIS — M54.50 ACUTE BILATERAL LOW BACK PAIN WITHOUT SCIATICA: ICD-10-CM

## 2020-03-17 DIAGNOSIS — M25.561 ACUTE PAIN OF RIGHT KNEE: ICD-10-CM

## 2020-03-17 DIAGNOSIS — R26.2 DIFFICULTY WALKING: ICD-10-CM

## 2020-03-17 DIAGNOSIS — Z48.89 AFTERCARE FOLLOWING SURGERY: Primary | ICD-10-CM

## 2020-03-17 PROCEDURE — 97112 NEUROMUSCULAR REEDUCATION: CPT

## 2020-03-17 PROCEDURE — 97140 MANUAL THERAPY 1/> REGIONS: CPT

## 2020-03-17 PROCEDURE — 97110 THERAPEUTIC EXERCISES: CPT

## 2020-03-17 NOTE — PROGRESS NOTES
Today's date: 3/17/2020  Patient name: Svetlana Alvarez  : 1995  MRN: [id number]  Referring provider: Alexa Mcgovern MD  Dx:   Encounter Diagnosis     ICD-10-CM    1  Aftercare following surgery Z48 89    2  Acute bilateral low back pain without sciatica M54 5    3  Acute pain of right knee M25 561    4  Difficulty walking R26 2      Subjective:  No new c/o pain today  Objective: See treatment log below    Assessment: Tolerated treatment well  Patient exhibited good technique with therapeutic exercises and would benefit from continued PT to increase ROM/strength and endurance to improve mobility and gait  Plan: Continue per plan of care  Progress treatment as tolerated  Precautions:  Right Knee Pain / Sx Plus Low Back Pain / SIJ Regions      Manual  2/26 3/3 3/11/2020 3/17    MT, ROM 45'  30 30'    HEP        Exercise Log 2/26 3/3 3/3 3/17    Balance Board 30x 30x 30x 30x ea    Chair Squats        P-Bar-GT-Forward, Backward,Side-Even & Dips        BOSU-Walk 5' 5' 5' 5'    Foam Pad SLR,Hip/KneeFl,Step Ups        Foam Beam        T/G-Squats PF 30x 30x 30x 30x ea    W/P-Hip-Abd,Add,Flex,Ext 10#-30x 10#-30x 10#  30x 10# 30x ea    WP-Squats NT       WP-PNF, IR, ER,Pu, PS 10# 30x ea 10#-30x 10#  30x 10# 30x ea    Srpjlpw-EB-Ay 2x2' 2x2' 2x2' 2x2'    NK Table Exer 5# 30x ea 5#-30x 5#  30x 10# 30x ea    NK Table ROM        TM        Stepper        Bike 10' 10' 10' 10'    ME, PE 15' 15' 15' 15'            Modalities 2/26 3/3 3/3 3/17    MH&ES 20'  20' NT    US

## 2020-03-19 ENCOUNTER — OFFICE VISIT (OUTPATIENT)
Dept: PHYSICAL THERAPY | Facility: CLINIC | Age: 25
End: 2020-03-19
Payer: COMMERCIAL

## 2020-03-19 DIAGNOSIS — M54.50 ACUTE BILATERAL LOW BACK PAIN WITHOUT SCIATICA: ICD-10-CM

## 2020-03-19 DIAGNOSIS — R26.2 DIFFICULTY WALKING: ICD-10-CM

## 2020-03-19 DIAGNOSIS — Z48.89 AFTERCARE FOLLOWING SURGERY: Primary | ICD-10-CM

## 2020-03-19 DIAGNOSIS — M25.561 ACUTE PAIN OF RIGHT KNEE: ICD-10-CM

## 2020-03-19 PROCEDURE — 97014 ELECTRIC STIMULATION THERAPY: CPT

## 2020-03-19 PROCEDURE — 97140 MANUAL THERAPY 1/> REGIONS: CPT

## 2020-03-19 PROCEDURE — G0283 ELEC STIM OTHER THAN WOUND: HCPCS

## 2020-03-19 PROCEDURE — 97112 NEUROMUSCULAR REEDUCATION: CPT

## 2020-03-19 NOTE — PROGRESS NOTES
Today's date: 3/19/2020  Patient name: Nahid Coreas  : 1995  MRN: [id number]  Referring provider: Jerrod Cardenas MD  Dx:   Encounter Diagnosis     ICD-10-CM    1  Aftercare following surgery Z48 89    2  Acute bilateral low back pain without sciatica M54 5    3  Acute pain of right knee M25 561    4  Difficulty walking R26 2      Subjective:  No new c/o pain today  Objective: See treatment log below    Assessment: Tolerated treatment well  Patient exhibited good technique with therapeutic exercises and would benefit from continued PT to increase ROM/strength and endurance to improve mobility and gait  Plan: Continue per plan of care  Progress treatment as tolerated  Precautions:  Right Knee Pain / Sx Plus Low Back Pain / SIJ Regions      Manual  2/26 3/3 3/11/2020 3/17 3/19   MT, ROM 45'  30 30' 20'   HEP        Exercise Log 2/26 3/3 3/3 3/17 3/19   Balance Board 30x 30x 30x 30x ea 30x ea   Chair Squats        P-Bar-GT-Forward, Backward,Side-Even & Dips        BOSU-Walk 5' 5' 5' 5' 5'   Foam Pad SLR,Hip/KneeFl,Step Ups        Foam Beam        T/G-Squats PF 30x 30x 30x 30x ea 30x ea   W/P-Hip-Abd,Add,Flex,Ext 10#-30x 10#-30x 10#  30x 10# 30x ea 10# 30x ea   WP-Squats NT       WP-PNF, IR, ER,Pu, PS 10# 30x ea 10#-30x 10#  30x 10# 30x ea 10# 30x ea   Hgfxgzc-FZ-Gb 2x2' 2x2' 2x2' 2x2' 2x2'   NK Table Exer 5# 30x ea 5#-30x 5#  30x 10# 30x ea 10# 30x ea   NK Table ROM        TM        Stepper        Bike 10' 10' 10' 10' 10'   ME, PE 15' 15' 15' 15' 15'           Modalities 2/26 3/3 3/3 3/17 3/19   MH&ES 20'  20' NT 20' ES   US

## 2020-03-19 NOTE — LETTER
2020  PT Reevaluation Padmini Michelle MD  [address] [facility]  [address]    Patient: Nahid Coreas   YOB: 1995   Date of Visit: 3/19/2020     Encounter Diagnosis     ICD-10-CM    1  Aftercare following surgery Z48 89    2  Acute bilateral low back pain without sciatica M54 5    3  Acute pain of right knee M25 561    4  Difficulty walking R26 2      Dear Dr Luis A Rolon:  Thank you for your recent referral of Nahid Coreas  Please review the attached evaluation summary from [facility] recent visit  Please verify that you agree with the plan of care by signing the attached order  If you have any questions or concerns, please do not hesitate to call  I sincerely appreciate the opportunity to share in the care of one of your patients and hope to have another opportunity to work with you in the near future  Sincerely,    Beatrice Gillette, PT    Referring Provider:      I certify that I have read the below Plan of Care and certify the need for these services furnished under this plan of treatment while under my care  Patti Ferreira MD  [address] [address] In Basket    Please SIGN ABOVE and return THIS PAGE ONLY to Fax # [phone]        Today's date: 3/19/2020  Patient name: Nahid Coreas  : 1995  MRN: [id number]  Referring provider: Jerrod Cardenas MD  Dx:   Encounter Diagnosis     ICD-10-CM    1  Aftercare following surgery Z48 89    2  Acute bilateral low back pain without sciatica M54 5    3  Acute pain of right knee M25 561    4  Difficulty walking R26 2      Subjective:  No new c/o pain today  Objective: See treatment log below    Assessment: Tolerated treatment well  Patient exhibited good technique with therapeutic exercises and would benefit from continued PT to increase ROM/strength and endurance to improve mobility and gait  Plan: Continue per plan of care  Progress treatment as tolerated         Precautions: Right Knee Pain / Sx Plus Low Back Pain / SIJ Regions  Manual  2/26 3/3 3/11/2020 3/17 3/19   MT, ROM 45'  30 30' 20'   HEP        Exercise Log 2/26 3/3 3/3 3/17 3/19   Balance Board 30x 30x 30x 30x ea 30x ea   Chair Squats        P-Bar-GT-Forward, Backward,Side-Even & Dips        BOSU-Walk 5' 5' 5' 5' 5'   Foam Pad SLR,Hip/KneeFl,Step Ups        Foam Beam        T/G-Squats PF 30x 30x 30x 30x ea 30x ea   W/P-Hip-Abd,Add,Flex,Ext 10#-30x 10#-30x 10#  30x 10# 30x ea 10# 30x ea   WP-Squats NT       WP-PNF, IR, ER,Pu, PS 10# 30x ea 10#-30x 10#  30x 10# 30x ea 10# 30x ea   Ebnqfof-ZA-Oj 2x2' 2x2' 2x2' 2x2' 2x2'   NK Table Exer 5# 30x ea 5#-30x 5#  30x 10# 30x ea 10# 30x ea   NK Table ROM        TM        Stepper        Bike 10' 10' 10' 10' 10'   ME, PE 15' 15' 15' 15' 15'           Modalities 2/26 3/3 3/3 3/17 3/19   MH&ES 20'  20' NT 20' ES   US                 PT Re-Evaluation   Today's date: 3/30/2020  Patient name: Tayla Funez  : 1995  MRN: [id number]  Referring provider: Kaushik Garcia MD  Dx:   Encounter Diagnosis     ICD-10-CM    1  Aftercare following surgery Z48 89    2  Acute bilateral low back pain without sciatica M54 5    3  Acute pain of right knee M25 561    4  Difficulty walking R26 2      Assessment  Assessment details: 3/30/2020  Tayla Funez has not returned for more treatment since the COVID 19 Flu issue has developed  Tayla Funez did not attend today's appointment  I cannot provide you with a current progress report but I can provide you with information based on previous performance  Tayla Funez is on hold since the COVID 19 issues has developed  Will restart therapy when appropriate  Impairments: abnormal gait, abnormal or restricted ROM, abnormal movement, activity intolerance, impaired balance, pain with function and safety issue  Understanding of Dx/Px/POC: excellent   Prognosis: good    Goals  STG 2-4 weeks:    Increase B LE strength 2-5 lbs     Decrease pain by 1-2 levels on 1-10 pain scale  Increase standing/walking tolerance to >30 minutes  Patient independent with HEP  LTG 6-8 weeks:   Increase B LE strength 10-20 lbs  Decrease pain to 0-2/10 with activity  Increase single leg stance >30 seconds  Increase standing/walking tolerance to >90 minutes  Increase range of motion to normal   Improve gait pattern, coordination and balance to normal   D/C with HEP  Plan  Plan details: All planned modality interventions and planned therapy interventions are provided PRN  Patient would benefit from: PT eval and skilled physical therapy  Planned modality interventions: ultrasound and unattended electrical stimulation  Planned therapy interventions: joint mobilization, manual therapy, balance, balance/weight bearing training, neuromuscular re-education, postural training, self care, strengthening, stretching, therapeutic activities, therapeutic exercise, therapeutic training, flexibility, coordination, gait training, graded exercise, home exercise program and transfer training  Frequency: 3x week  Duration in weeks: 12  Treatment plan discussed with: patient      Subjective Evaluation    Pain  Quality: discomfort, knife-like, pulling, squeezing and sharp  Relieving factors: change in position, relaxation, rest, support and heat  Aggravating factors: lifting, running, stair climbing, walking and standing  Progression: improved    Treatments  Previous treatment: physical therapy  Current treatment: physical therapy  Patient Goals  Patient goals for therapy: improved balance, decreased pain, increased motion, return to work, return to [employer] activities, independence with ADLs/IADLs and increased strength        Objective     Date of onset:  9/16/2019    Date of Surgery:  9/16/2019    History of Present Episode: 10/7/2019  Aurora Baca states she has had issues with her right knee for several years    She finally went for surgery since she fell recently on her right knee and was in severe pain  Past Medical History:    10/7/2019  Ford Hinds reports right knee issues since age 15  Asthma  Thyroid issues  Previous Level of Functional Ability:  10/7/2019  Ford Hinds states her right knee was getting worse and worse to walk at times  Inspection / Palpation:  Knee:  10/7/2019  Mesomorphic / Endomorphic body type  Moderate to severe signs of infection  No signs of wounds  No signs of drainage  Mild to moderate signs of ecchymotic regions  Mild to moderate signs of erythremic regions  Moderate signs of muscle spasm  Moderate signs of muscle guarding  Moderate signs of tenderness reported to palpation  Moderate signs of swelling  Positive signs of a surgery site  Current conditions appears consistent with recent acute surgery recovery episode  Chief Complaints:  10/7/2019  Ford Hinds reports moderate to severe difficulty with standing  Ford Hinds reports moderate to severe difficulty with walking  Ford Hinds reports moderate to severe difficulty with movement / use of her right knee  Ford Hinds reports severe difficulty with use of stairs  Ford Hinds reports severe difficulty with running  Ford Hinds reports severe difficulty with jumping  Ford Hinds reports moderate to severe difficulty with use of inclines  Ford Hinds reports moderate difficulty with sleeping  Ford Hinds reports moderate difficulty with her strength and endurance  Ford Hinds reports moderate to severe limitations with her range of motion  Ford Hinds reports moderate to severe difficulty lying on her right knee region      KNEE PAIN Resting Moving Palpation Standing Walking   10/7/2019 Lt 0 0 0 0 0   10/7/2019 Rt 0-3 2-6 4-7 3-7 3-7   11/13/2019 Rt 0-2 1-4 2-5 2-5 2-5   11/15/2019 Rt 0-2 1-4 2-5 2-5 2-5   12/16/2019 Rt 0-1 1-3 1-4 1-4 1-4   1/22/2020 Rt 0-1 1-2 1-2 1-2 1-2     LUMBAR PAIN     Resting Palpation Bending Extending Walking Lifting   2/24/2020 5-7 5-8 6-8 5-8 4-8 4-9     LUMBAR PAIN     Pulling Pushing Sleeping Twisting Standing   2020 4-8 4-8 4-8 4-8 4-8     KNEE PAIN Running Stairs Sleeping Twisting Jumping   10/7/2019 Lt 0 0 0 0 0   10/7/2019 Rt NA NA 3-6 4-7 NA   2019 Rt NA NA 1-4 2-5 NA   11/15/2019 Rt NA NA 1-4 2-5 NA   2019 Rt NA NA 1-3 1-4 NA   2020 Rt 3-6 2-5 1-2 1-2 3-6     KNEE AROM Flexion Extension SLR   10/7/2019 Lt 140° 0° 95°   10/7/2019 Rt 65° 0° 74°   2019 Rt 95° 0° 82°   11/15/2019 Rt 95° 0° 82°   2109 Rt 121° 0° 88°   2020 Rt 129° 0° 92°     LUMBAR AROM Flexion Extension Rotation Right   2020 45° 8° 43°     LUMBAR AROM Rotation Left Side Bending Right Side Bending Left   2020 44° 41° 40°     KNEE MMT Flexion Extension Varus Stress Valgus Stress   10/7/2019 Lt 0/10  35 lbs 0/10  33 lbs 010  29 lbs 0/10  30 lbs   10/7/2019 Rt 5/10  7 lbs 5/10  6 lbs 5/10  5 lbs 6/10  6 lbs   2019 Rt 10  12 lbs 4/10  11 lbs 410  10 lbs 10  11 lbs   11/15/2019 Rt 10  12 lbs 410  11 lbs 410  10 lbs /10  11 lbs   2019 Rt 3/10  16 lbs 2/10  17 lbs 210  16 lbs 210  15 lbs   2020 Rt 2/10  20 lbs 110  19 lbs 110  18 lbs 10  19 lbs     LUMBAR MMT / PAIN Flexion Extension Rotation Right   2020 5/10  11 lbs 5/10  13 lbs 5/10  11 lbs     LUMBAR MMT / PAIN Rotation Left Side Bend Right Side Bend Left   2020 5/10  12 lbs 5/10  10 lbs 5/10  11 lbs     Precautions: Right Knee Surgery and Low Back Pain      PT Discharge  Today's date: 2020  Patient name: Brandy Dinero  : 1995  MRN: [id number]  Referring provider: Tere Villalta MD  Dx:   Encounter Diagnosis     ICD-10-CM    1  Aftercare following surgery Z48 89 PT plan of care cert/re-cert   2  Acute bilateral low back pain without sciatica M54 5 PT plan of care cert/re-cert   3  Acute pain of right knee M25 561 PT plan of care cert/re-cert   4   Difficulty walking R26 2 PT plan of care cert/re-cert     Assessment/Plan    Subjective    Objective   2020  Venus Balboa has not returned for more treatment since the COVID 19 Flu issue has developed  Arjun Goldstein did not attend today's appointment  I cannot provide you with a current progress report but I can provide you with information based on previous performance  Arjun Goldstein is now discharged  Will restart therapy after new evaluation and if appropriate

## 2020-03-19 NOTE — LETTER
2020  PT Discharge Padmini Michelle MD  [address] [facility]  [address]    Patient: Natalie Medina   YOB: 1995   Date of Visit: 3/19/2020     Encounter Diagnosis     ICD-10-CM    1  Aftercare following surgery Z48 89 PT plan of care cert/re-cert   2  Acute bilateral low back pain without sciatica M54 5 PT plan of care cert/re-cert   3  Acute pain of right knee M25 561 PT plan of care cert/re-cert   4  Difficulty walking R26 2 PT plan of care cert/re-cert     Dear Dr Yesenia Higgins:  Thank you for your recent referral of Natalie Medina  Please review the attached evaluation summary from [place] [address] recent visit  Please verify that you agree with the plan of care by signing the attached order  If you have any questions or concerns, please do not hesitate to call  I sincerely appreciate the opportunity to share in the care of one of your patients and hope to have another opportunity to work with you in the near future  Sincerely,    Oscar Drake, PT    Referring Provider:      I certify that I have read the below Plan of Care and certify the need for these services furnished under this plan of treatment while under my care  Akin Redmond MD  [address] [place] [address] In Basket    Please SIGN ABOVE and return THIS PAGE ONLY to Fax # [phone]        Today's date: 3/19/2020  Patient name: Natalie Medina  : 1995  MRN: [id number]  Referring provider: Apoorva Spangler MD  Dx:   Encounter Diagnosis     ICD-10-CM    1  Aftercare following surgery Z48 89    2  Acute bilateral low back pain without sciatica M54 5    3  Acute pain of right knee M25 561    4  Difficulty walking R26 2      Subjective:  No new c/o pain today  Objective: See treatment log below    Assessment: Tolerated treatment well   Patient exhibited good technique with therapeutic exercises and would benefit from continued PT to increase ROM/strength and endurance to improve mobility and gait  Plan: Continue per plan of care  Progress treatment as tolerated  Precautions:  Right Knee Pain / Sx Plus Low Back Pain / SIJ Regions  Manual  2/26 3/3 3/11/2020 3/17 3/19   MT, ROM 45'  30 30' 20'   HEP        Exercise Log 2/26 3/3 3/3 3/17 3/19   Balance Board 30x 30x 30x 30x ea 30x ea   Chair Squats        P-Bar-GT-Forward, Backward,Side-Even & Dips        BOSU-Walk 5' 5' 5' 5' 5'   Foam Pad SLR,Hip/KneeFl,Step Ups        Foam Beam        T/G-Squats PF 30x 30x 30x 30x ea 30x ea   W/P-Hip-Abd,Add,Flex,Ext 10#-30x 10#-30x 10#  30x 10# 30x ea 10# 30x ea   WP-Squats NT       WP-PNF, IR, ER,Pu, PS 10# 30x ea 10#-30x 10#  30x 10# 30x ea 10# 30x ea   Aerajpb-HT-Vw 2x2' 2x2' 2x2' 2x2' 2x2'   NK Table Exer 5# 30x ea 5#-30x 5#  30x 10# 30x ea 10# 30x ea   NK Table ROM        TM        Stepper        Bike 10' 10' 10' 10' 10'   ME, PE 15' 15' 15' 15' 15'           Modalities 2/26 3/3 3/3 3/17 3/19   MH&ES 20'  20' NT 20' ES   US                 PT Re-Evaluation   Today's date: 3/30/2020  Patient name: Marcin Mae  : 1995  MRN: [id number]  Referring provider: Alison Padilla MD  Dx:   Encounter Diagnosis     ICD-10-CM    1  Aftercare following surgery Z48 89    2  Acute bilateral low back pain without sciatica M54 5    3  Acute pain of right knee M25 561    4  Difficulty walking R26 2      Assessment  Assessment details: 3/30/2020  Marcin Mae has not returned for more treatment since the COVID 19 Flu issue has developed  Marcin Mae did not attend today's appointment  I cannot provide you with a current progress report but I can provide you with information based on previous performance  Marcin Mae is on hold since the COVID 19 issues has developed  Will restart therapy when appropriate      Impairments: abnormal gait, abnormal or restricted ROM, abnormal movement, activity intolerance, impaired balance, pain with function and safety issue  Understanding of Dx/Px/POC: excellent   Prognosis: good    Goals  STG 2-4 weeks:    Increase B LE strength 2-5 lbs  Decrease pain by 1-2 levels on 1-10 pain scale  Increase standing/walking tolerance to >30 minutes  Patient independent with HEP  LTG 6-8 weeks:   Increase B LE strength 10-20 lbs  Decrease pain to 0-2/10 with activity  Increase single leg stance >30 seconds  Increase standing/walking tolerance to >90 minutes  Increase range of motion to normal   Improve gait pattern, coordination and balance to normal   D/C with HEP  Plan  Plan details: All planned modality interventions and planned therapy interventions are provided PRN    Patient would benefit from: PT eval and skilled physical therapy  Planned modality interventions: ultrasound and unattended electrical stimulation  Planned therapy interventions: joint mobilization, manual therapy, balance, balance/weight bearing training, neuromuscular re-education, postural training, self care, strengthening, stretching, therapeutic activities, therapeutic exercise, therapeutic training, flexibility, coordination, gait training, graded exercise, home exercise program and transfer training  Frequency: 3x week  Duration in weeks: 12  Treatment plan discussed with: patient      Subjective Evaluation    Pain  Quality: discomfort, knife-like, pulling, squeezing and sharp  Relieving factors: change in position, relaxation, rest, support and heat  Aggravating factors: lifting, running, stair climbing, walking and standing  Progression: improved    Treatments  Previous treatment: physical therapy  Current treatment: physical therapy  Patient Goals  Patient goals for therapy: improved balance, decreased pain, increased motion, return to work, return to [employer] activities, independence with ADLs/IADLs and increased strength        Objective     Date of onset:  9/16/2019    Date of Surgery:  9/16/2019    History of Present Episode: 10/7/2019  Rivas Erasmo states she has had issues with her right knee for several years  She finally went for surgery since she fell recently on her right knee and was in severe pain  Past Medical History:    10/7/2019  Álvaro Marcelo reports right knee issues since age 15  Asthma  Thyroid issues  Previous Level of Functional Ability:  10/7/2019  Álvaro Marcelo states her right knee was getting worse and worse to walk at times  Inspection / Palpation:  Knee:  10/7/2019  Mesomorphic / Endomorphic body type  Moderate to severe signs of infection  No signs of wounds  No signs of drainage  Mild to moderate signs of ecchymotic regions  Mild to moderate signs of erythremic regions  Moderate signs of muscle spasm  Moderate signs of muscle guarding  Moderate signs of tenderness reported to palpation  Moderate signs of swelling  Positive signs of a surgery site  Current conditions appears consistent with recent acute surgery recovery episode  Chief Complaints:  10/7/2019  Álvaro Marcelo reports moderate to severe difficulty with standing  Álvaro Marcelo reports moderate to severe difficulty with walking  Álvaro Marcelo reports moderate to severe difficulty with movement / use of her right knee  Álvaro Marcelo reports severe difficulty with use of stairs  Álvaro Marcelo reports severe difficulty with running  Álvaro Marcelo reports severe difficulty with jumping  Álvaro Marcelo reports moderate to severe difficulty with use of inclines  Álvaro Marcelo reports moderate difficulty with sleeping  Álvaro Marcelo reports moderate difficulty with her strength and endurance  Álvaro Marcelo reports moderate to severe limitations with her range of motion  Álvaro Marcelo reports moderate to severe difficulty lying on her right knee region      KNEE PAIN Resting Moving Palpation Standing Walking   10/7/2019 Lt 0 0 0 0 0   10/7/2019 Rt 0-3 2-6 4-7 3-7 3-7   11/13/2019 Rt 0-2 1-4 2-5 2-5 2-5   11/15/2019 Rt 0-2 1-4 2-5 2-5 2-5   12/16/2019 Rt 0-1 1-3 1-4 1-4 1-4   1/22/2020 Rt 0-1 1-2 1-2 1-2 1-2     LUMBAR PAIN     Resting Palpation Bending Extending Walking Lifting   2020 5-7 5-8 6-8 5-8 4-8 4-9     LUMBAR PAIN     Pulling Pushing Sleeping Twisting Standing   2020 4-8 4-8 4-8 4-8 4-8     KNEE PAIN Running Stairs Sleeping Twisting Jumping   10/7/2019 Lt 0 0 0 0 0   10/7/2019 Rt NA NA 3-6 4-7 NA   2019 Rt NA NA 1-4 2-5 NA   11/15/2019 Rt NA NA 1-4 2-5 NA   2019 Rt NA NA 1-3 1-4 NA   2020 Rt 3-6 2-5 1-2 1-2 3-6     KNEE AROM Flexion Extension SLR   10/7/2019 Lt 140° 0° 95°   10/7/2019 Rt 65° 0° 74°   2019 Rt 95° 0° 82°   11/15/2019 Rt 95° 0° 82°   2109 Rt 121° 0° 88°   2020 Rt 129° 0° 92°     LUMBAR AROM Flexion Extension Rotation Right   2020 45° 8° 43°     LUMBAR AROM Rotation Left Side Bending Right Side Bending Left   2020 44° 41° 40°     KNEE MMT Flexion Extension Varus Stress Valgus Stress   10/7/2019 Lt 010  35 lbs 010  33 lbs 010  29 lbs 010  30 lbs   10/7/2019 Rt 5/10  7 lbs 10  6 lbs 5/10  5 lbs 10  6 lbs   2019 Rt 4/10  12 lbs 10  11 lbs 10  10 lbs 10  11 lbs   11/15/2019 Rt 10  12 lbs 410  11 lbs 410  10 lbs 10  11 lbs   2019 Rt 3/10  16 lbs 2/10  17 lbs 2/10  16 lbs 10  15 lbs   2020 Rt 2/10  20 lbs 10  19 lbs 1/10  18 lbs 1/10  19 lbs     LUMBAR MMT / PAIN Flexion Extension Rotation Right   2020 5/10  11 lbs 5/10  13 lbs 10  11 lbs     LUMBAR MMT / PAIN Rotation Left Side Bend Right Side Bend Left   2020 5/10  12 lbs 5/10  10 lbs 5/10  11 lbs     Precautions: Right Knee Surgery and Low Back Pain      PT Discharge  Today's date: 2020  Patient name: Svetlana Alvarez  : 1995  MRN: [id number]  Referring provider: Alexa Mcgovern MD  Dx:   Encounter Diagnosis     ICD-10-CM    1  Aftercare following surgery Z48 89 PT plan of care cert/re-cert   2  Acute bilateral low back pain without sciatica M54 5 PT plan of care cert/re-cert   3  Acute pain of right knee M25 561 PT plan of care cert/re-cert   4   Difficulty walking R26 2 PT plan of care cert/re-cert     Assessment/Plan    Subjective    Objective   4/13/2020  Svetlana Alvarez has not returned for more treatment since the COVID 19 Flu issue has developed  Svetlana Alvarez did not attend today's appointment  I cannot provide you with a current progress report but I can provide you with information based on previous performance  Svetlana Alvarez is now discharged  Will restart therapy after new evaluation and if appropriate

## 2020-03-24 ENCOUNTER — APPOINTMENT (OUTPATIENT)
Dept: PHYSICAL THERAPY | Facility: CLINIC | Age: 25
End: 2020-03-24
Payer: COMMERCIAL

## 2020-03-26 ENCOUNTER — APPOINTMENT (OUTPATIENT)
Dept: PHYSICAL THERAPY | Facility: CLINIC | Age: 25
End: 2020-03-26
Payer: COMMERCIAL

## 2020-03-30 NOTE — PROGRESS NOTES
PT Re-Evaluation   Today's date: 3/30/2020  Patient name: Jannet Varner  : 1995  MRN: [id number]  Referring provider: Adalberto To MD  Dx:   Encounter Diagnosis     ICD-10-CM    1  Aftercare following surgery Z48 89    2  Acute bilateral low back pain without sciatica M54 5    3  Acute pain of right knee M25 561    4  Difficulty walking R26 2      Assessment  Assessment details: 3/30/2020  Jannet Varner has not returned for more treatment since the COVID 19 Flu issue has developed  Jannet Varner did not attend today's appointment  I cannot provide you with a current progress report but I can provide you with information based on previous performance  Jannet Varner is on hold since the COVID 19 issues has developed  Will restart therapy when appropriate  Impairments: abnormal gait, abnormal or restricted ROM, abnormal movement, activity intolerance, impaired balance, pain with function and safety issue  Understanding of Dx/Px/POC: excellent   Prognosis: good    Goals  STG 2-4 weeks:    Increase B LE strength 2-5 lbs  Decrease pain by 1-2 levels on 1-10 pain scale  Increase standing/walking tolerance to >30 minutes  Patient independent with HEP  LTG 6-8 weeks:   Increase B LE strength 10-20 lbs  Decrease pain to 0-2/10 with activity  Increase single leg stance >30 seconds  Increase standing/walking tolerance to >90 minutes  Increase range of motion to normal   Improve gait pattern, coordination and balance to normal   D/C with HEP  Plan  Plan details: All planned modality interventions and planned therapy interventions are provided PRN    Patient would benefit from: PT eval and skilled physical therapy  Planned modality interventions: ultrasound and unattended electrical stimulation  Planned therapy interventions: joint mobilization, manual therapy, balance, balance/weight bearing training, neuromuscular re-education, postural training, self care, strengthening, stretching, therapeutic activities, therapeutic exercise, therapeutic training, flexibility, coordination, gait training, graded exercise, home exercise program and transfer training  Frequency: 3x week  Duration in weeks: 12  Treatment plan discussed with: patient      Subjective Evaluation    Pain  Quality: discomfort, knife-like, pulling, squeezing and sharp  Relieving factors: change in position, relaxation, rest, support and heat  Aggravating factors: lifting, running, stair climbing, walking and standing  Progression: improved    Treatments  Previous treatment: physical therapy  Current treatment: physical therapy  Patient Goals  Patient goals for therapy: improved balance, decreased pain, increased motion, return to work, return to [employer] activities, independence with ADLs/IADLs and increased strength        Objective     Date of onset:  9/16/2019    Date of Surgery:  9/16/2019    History of Present Episode: 10/7/2019  Angie Hoang states she has had issues with her right knee for several years  She finally went for surgery since she fell recently on her right knee and was in severe pain  Past Medical History:    10/7/2019  Angie Hoang reports right knee issues since age 15  Asthma  Thyroid issues  Previous Level of Functional Ability:  10/7/2019  Angie Hoang states her right knee was getting worse and worse to walk at times  Inspection / Palpation:  Knee:  10/7/2019  Mesomorphic / Endomorphic body type  Moderate to severe signs of infection  No signs of wounds  No signs of drainage  Mild to moderate signs of ecchymotic regions  Mild to moderate signs of erythremic regions  Moderate signs of muscle spasm  Moderate signs of muscle guarding  Moderate signs of tenderness reported to palpation  Moderate signs of swelling  Positive signs of a surgery site  Current conditions appears consistent with recent acute surgery recovery episode      Chief Complaints:  10/7/2019  Angie Hoang reports moderate to severe difficulty with standing  Shital Fraire reports moderate to severe difficulty with walking  Shital Fraire reports moderate to severe difficulty with movement / use of her right knee  Shital Fraire reports severe difficulty with use of stairs  Shital Fraire reports severe difficulty with running  Shital Fraire reports severe difficulty with jumping  Shital Fraire reports moderate to severe difficulty with use of inclines  Shital Fraire reports moderate difficulty with sleeping  Shital Fraire reports moderate difficulty with her strength and endurance  Shtial Fraire reports moderate to severe limitations with her range of motion  Shital Fraire reports moderate to severe difficulty lying on her right knee region      KNEE PAIN Resting Moving Palpation Standing Walking   10/7/2019 Lt 0 0 0 0 0   10/7/2019 Rt 0-3 2-6 4-7 3-7 3-7   11/13/2019 Rt 0-2 1-4 2-5 2-5 2-5   11/15/2019 Rt 0-2 1-4 2-5 2-5 2-5   12/16/2019 Rt 0-1 1-3 1-4 1-4 1-4   1/22/2020 Rt 0-1 1-2 1-2 1-2 1-2     LUMBAR PAIN     Resting Palpation Bending Extending Walking Lifting   2/24/2020 5-7 5-8 6-8 5-8 4-8 4-9     LUMBAR PAIN     Pulling Pushing Sleeping Twisting Standing   2/24/2020 4-8 4-8 4-8 4-8 4-8     KNEE PAIN Running Stairs Sleeping Twisting Jumping   10/7/2019 Lt 0 0 0 0 0   10/7/2019 Rt NA NA 3-6 4-7 NA   11/13/2019 Rt NA NA 1-4 2-5 NA   11/15/2019 Rt NA NA 1-4 2-5 NA   12/16/2019 Rt NA NA 1-3 1-4 NA   1/22/2020 Rt 3-6 2-5 1-2 1-2 3-6     KNEE AROM Flexion Extension SLR   10/7/2019 Lt 140° 0° 95°   10/7/2019 Rt 65° 0° 74°   11/13/2019 Rt 95° 0° 82°   11/15/2019 Rt 95° 0° 82°   12/16/2109 Rt 121° 0° 88°   1/22/2020 Rt 129° 0° 92°     LUMBAR AROM Flexion Extension Rotation Right   2/24/2020 45° 8° 43°     LUMBAR AROM Rotation Left Side Bending Right Side Bending Left   2/24/2020 44° 41° 40°     KNEE MMT Flexion Extension Varus Stress Valgus Stress   10/7/2019 Lt 0/10  35 lbs 0/10  33 lbs 0/10  29 lbs 0/10  30 lbs   10/7/2019 Rt 5/10  7 lbs 5/10  6 lbs 5/10  5 lbs 6/10  6 lbs   11/13/2019 Rt 4/10  12 lbs 4/10  11 lbs 4/10  10 lbs 4/10  11 lbs   11/15/2019 Rt 4/10  12 lbs 4/10  11 lbs 4/10  10 lbs 4/10  11 lbs   12/16/2019 Rt 3/10  16 lbs 2/10  17 lbs 2/10  16 lbs 2/10  15 lbs   1/22/2020 Rt 2/10  20 lbs 1/10  19 lbs 1/10  18 lbs 1/10  19 lbs     LUMBAR MMT / PAIN Flexion Extension Rotation Right   2/24/2020 5/10  11 lbs 5/10  13 lbs 5/10  11 lbs     LUMBAR MMT / PAIN Rotation Left Side Bend Right Side Bend Left   2/24/2020 5/10  12 lbs 5/10  10 lbs 5/10  11 lbs     Precautions: Right Knee Surgery and Low Back Pain

## 2020-04-13 NOTE — PROGRESS NOTES
PT Discharge  Today's date: 2020  Patient name: Ely Martin  : 1995  MRN: [id number]  Referring provider: Eddie Hobson MD  Dx:   Encounter Diagnosis     ICD-10-CM    1  Aftercare following surgery Z48 89 PT plan of care cert/re-cert   2  Acute bilateral low back pain without sciatica M54 5 PT plan of care cert/re-cert   3  Acute pain of right knee M25 561 PT plan of care cert/re-cert   4  Difficulty walking R26 2 PT plan of care cert/re-cert     Assessment/Plan    Subjective    Objective   2020  Ely Martin has not returned for more treatment since the COVID 19 Flu issue has developed  Ely Martin did not attend today's appointment  I cannot provide you with a current progress report but I can provide you with information based on previous performance  Ely Martin is now discharged  Will restart therapy after new evaluation and if appropriate

## 2020-05-04 ENCOUNTER — EVALUATION (OUTPATIENT)
Dept: PHYSICAL THERAPY | Facility: CLINIC | Age: 25
End: 2020-05-04
Payer: COMMERCIAL

## 2020-05-04 DIAGNOSIS — M54.41 ACUTE BILATERAL LOW BACK PAIN WITH BILATERAL SCIATICA: ICD-10-CM

## 2020-05-04 DIAGNOSIS — M54.42 ACUTE BILATERAL LOW BACK PAIN WITH BILATERAL SCIATICA: ICD-10-CM

## 2020-05-04 DIAGNOSIS — Z48.89 AFTERCARE FOLLOWING SURGERY: Primary | ICD-10-CM

## 2020-05-04 PROCEDURE — 97535 SELF CARE MNGMENT TRAINING: CPT | Performed by: PHYSICAL THERAPIST

## 2020-05-04 PROCEDURE — 97140 MANUAL THERAPY 1/> REGIONS: CPT | Performed by: PHYSICAL THERAPIST

## 2020-05-04 PROCEDURE — 97162 PT EVAL MOD COMPLEX 30 MIN: CPT | Performed by: PHYSICAL THERAPIST

## 2020-05-05 ENCOUNTER — OFFICE VISIT (OUTPATIENT)
Dept: PHYSICAL THERAPY | Facility: CLINIC | Age: 25
End: 2020-05-05
Payer: COMMERCIAL

## 2020-05-05 DIAGNOSIS — M54.41 ACUTE BILATERAL LOW BACK PAIN WITH BILATERAL SCIATICA: ICD-10-CM

## 2020-05-05 DIAGNOSIS — Z48.89 AFTERCARE FOLLOWING SURGERY: Primary | ICD-10-CM

## 2020-05-05 DIAGNOSIS — M54.42 ACUTE BILATERAL LOW BACK PAIN WITH BILATERAL SCIATICA: ICD-10-CM

## 2020-05-05 PROCEDURE — 97112 NEUROMUSCULAR REEDUCATION: CPT | Performed by: PHYSICAL THERAPIST

## 2020-05-05 PROCEDURE — 97110 THERAPEUTIC EXERCISES: CPT | Performed by: PHYSICAL THERAPIST

## 2020-05-05 PROCEDURE — 97140 MANUAL THERAPY 1/> REGIONS: CPT | Performed by: PHYSICAL THERAPIST

## 2020-05-07 ENCOUNTER — OFFICE VISIT (OUTPATIENT)
Dept: PHYSICAL THERAPY | Facility: CLINIC | Age: 25
End: 2020-05-07
Payer: COMMERCIAL

## 2020-05-07 DIAGNOSIS — Z48.89 AFTERCARE FOLLOWING SURGERY: Primary | ICD-10-CM

## 2020-05-07 DIAGNOSIS — M54.41 ACUTE BILATERAL LOW BACK PAIN WITH BILATERAL SCIATICA: ICD-10-CM

## 2020-05-07 DIAGNOSIS — M54.42 ACUTE BILATERAL LOW BACK PAIN WITH BILATERAL SCIATICA: ICD-10-CM

## 2020-05-07 PROCEDURE — 97112 NEUROMUSCULAR REEDUCATION: CPT | Performed by: PHYSICAL THERAPIST

## 2020-05-07 PROCEDURE — 97140 MANUAL THERAPY 1/> REGIONS: CPT | Performed by: PHYSICAL THERAPIST

## 2020-05-11 ENCOUNTER — APPOINTMENT (OUTPATIENT)
Dept: PHYSICAL THERAPY | Facility: CLINIC | Age: 25
End: 2020-05-11
Payer: COMMERCIAL

## 2020-05-12 ENCOUNTER — OFFICE VISIT (OUTPATIENT)
Dept: PHYSICAL THERAPY | Facility: CLINIC | Age: 25
End: 2020-05-12
Payer: COMMERCIAL

## 2020-05-12 DIAGNOSIS — Z48.89 AFTERCARE FOLLOWING SURGERY: Primary | ICD-10-CM

## 2020-05-12 DIAGNOSIS — M54.42 ACUTE BILATERAL LOW BACK PAIN WITH BILATERAL SCIATICA: ICD-10-CM

## 2020-05-12 DIAGNOSIS — M54.41 ACUTE BILATERAL LOW BACK PAIN WITH BILATERAL SCIATICA: ICD-10-CM

## 2020-05-12 PROCEDURE — 97110 THERAPEUTIC EXERCISES: CPT | Performed by: PHYSICAL THERAPIST

## 2020-05-12 PROCEDURE — 97112 NEUROMUSCULAR REEDUCATION: CPT | Performed by: PHYSICAL THERAPIST

## 2020-05-12 PROCEDURE — 97140 MANUAL THERAPY 1/> REGIONS: CPT | Performed by: PHYSICAL THERAPIST

## 2020-05-14 ENCOUNTER — APPOINTMENT (OUTPATIENT)
Dept: PHYSICAL THERAPY | Facility: CLINIC | Age: 25
End: 2020-05-14
Payer: COMMERCIAL

## 2020-05-18 ENCOUNTER — OFFICE VISIT (OUTPATIENT)
Dept: PHYSICAL THERAPY | Facility: CLINIC | Age: 25
End: 2020-05-18
Payer: COMMERCIAL

## 2020-05-18 DIAGNOSIS — M54.41 ACUTE BILATERAL LOW BACK PAIN WITH BILATERAL SCIATICA: ICD-10-CM

## 2020-05-18 DIAGNOSIS — M54.42 ACUTE BILATERAL LOW BACK PAIN WITH BILATERAL SCIATICA: ICD-10-CM

## 2020-05-18 DIAGNOSIS — Z48.89 AFTERCARE FOLLOWING SURGERY: Primary | ICD-10-CM

## 2020-05-18 PROCEDURE — 97164 PT RE-EVAL EST PLAN CARE: CPT | Performed by: PHYSICAL THERAPIST

## 2020-05-18 PROCEDURE — 97140 MANUAL THERAPY 1/> REGIONS: CPT

## 2020-05-21 ENCOUNTER — APPOINTMENT (OUTPATIENT)
Dept: PHYSICAL THERAPY | Facility: CLINIC | Age: 25
End: 2020-05-21
Payer: COMMERCIAL

## 2020-05-26 ENCOUNTER — OFFICE VISIT (OUTPATIENT)
Dept: PHYSICAL THERAPY | Facility: CLINIC | Age: 25
End: 2020-05-26
Payer: COMMERCIAL

## 2020-05-26 DIAGNOSIS — M54.41 ACUTE BILATERAL LOW BACK PAIN WITH BILATERAL SCIATICA: ICD-10-CM

## 2020-05-26 DIAGNOSIS — M54.42 ACUTE BILATERAL LOW BACK PAIN WITH BILATERAL SCIATICA: ICD-10-CM

## 2020-05-26 DIAGNOSIS — Z48.89 AFTERCARE FOLLOWING SURGERY: Primary | ICD-10-CM

## 2020-05-26 PROCEDURE — 97140 MANUAL THERAPY 1/> REGIONS: CPT | Performed by: PHYSICAL THERAPIST

## 2020-05-26 PROCEDURE — 97112 NEUROMUSCULAR REEDUCATION: CPT | Performed by: PHYSICAL THERAPIST

## 2020-05-26 PROCEDURE — 97110 THERAPEUTIC EXERCISES: CPT | Performed by: PHYSICAL THERAPIST

## 2020-05-28 ENCOUNTER — OFFICE VISIT (OUTPATIENT)
Dept: PHYSICAL THERAPY | Facility: CLINIC | Age: 25
End: 2020-05-28
Payer: COMMERCIAL

## 2020-05-28 DIAGNOSIS — M54.41 ACUTE BILATERAL LOW BACK PAIN WITH BILATERAL SCIATICA: ICD-10-CM

## 2020-05-28 DIAGNOSIS — Z48.89 AFTERCARE FOLLOWING SURGERY: Primary | ICD-10-CM

## 2020-05-28 DIAGNOSIS — M54.42 ACUTE BILATERAL LOW BACK PAIN WITH BILATERAL SCIATICA: ICD-10-CM

## 2020-05-28 PROCEDURE — 97140 MANUAL THERAPY 1/> REGIONS: CPT | Performed by: PHYSICAL THERAPIST

## 2020-05-28 PROCEDURE — 97112 NEUROMUSCULAR REEDUCATION: CPT | Performed by: PHYSICAL THERAPIST

## 2020-05-28 PROCEDURE — 97110 THERAPEUTIC EXERCISES: CPT | Performed by: PHYSICAL THERAPIST

## 2020-06-02 ENCOUNTER — OFFICE VISIT (OUTPATIENT)
Dept: PHYSICAL THERAPY | Facility: CLINIC | Age: 25
End: 2020-06-02
Payer: COMMERCIAL

## 2020-06-02 DIAGNOSIS — M54.41 ACUTE BILATERAL LOW BACK PAIN WITH BILATERAL SCIATICA: ICD-10-CM

## 2020-06-02 DIAGNOSIS — M54.42 ACUTE BILATERAL LOW BACK PAIN WITH BILATERAL SCIATICA: ICD-10-CM

## 2020-06-02 DIAGNOSIS — Z48.89 AFTERCARE FOLLOWING SURGERY: Primary | ICD-10-CM

## 2020-06-02 PROCEDURE — 97140 MANUAL THERAPY 1/> REGIONS: CPT

## 2020-06-04 ENCOUNTER — OFFICE VISIT (OUTPATIENT)
Dept: PHYSICAL THERAPY | Facility: CLINIC | Age: 25
End: 2020-06-04
Payer: COMMERCIAL

## 2020-06-04 DIAGNOSIS — M54.42 ACUTE BILATERAL LOW BACK PAIN WITH BILATERAL SCIATICA: ICD-10-CM

## 2020-06-04 DIAGNOSIS — Z48.89 AFTERCARE FOLLOWING SURGERY: Primary | ICD-10-CM

## 2020-06-04 DIAGNOSIS — M54.41 ACUTE BILATERAL LOW BACK PAIN WITH BILATERAL SCIATICA: ICD-10-CM

## 2020-06-04 PROCEDURE — 97164 PT RE-EVAL EST PLAN CARE: CPT | Performed by: PHYSICAL THERAPIST

## 2020-06-04 PROCEDURE — 97140 MANUAL THERAPY 1/> REGIONS: CPT

## 2020-06-09 ENCOUNTER — OFFICE VISIT (OUTPATIENT)
Dept: PHYSICAL THERAPY | Facility: CLINIC | Age: 25
End: 2020-06-09
Payer: COMMERCIAL

## 2020-06-09 DIAGNOSIS — M54.41 ACUTE BILATERAL LOW BACK PAIN WITH BILATERAL SCIATICA: ICD-10-CM

## 2020-06-09 DIAGNOSIS — M54.42 ACUTE BILATERAL LOW BACK PAIN WITH BILATERAL SCIATICA: ICD-10-CM

## 2020-06-09 DIAGNOSIS — Z48.89 AFTERCARE FOLLOWING SURGERY: Primary | ICD-10-CM

## 2020-06-09 PROCEDURE — 97140 MANUAL THERAPY 1/> REGIONS: CPT

## 2020-06-09 PROCEDURE — 97112 NEUROMUSCULAR REEDUCATION: CPT

## 2020-06-11 ENCOUNTER — TRANSCRIBE ORDERS (OUTPATIENT)
Dept: PHYSICAL THERAPY | Facility: CLINIC | Age: 25
End: 2020-06-11

## 2020-06-11 ENCOUNTER — OFFICE VISIT (OUTPATIENT)
Dept: PHYSICAL THERAPY | Facility: CLINIC | Age: 25
End: 2020-06-11
Payer: COMMERCIAL

## 2020-06-11 DIAGNOSIS — M54.41 ACUTE BILATERAL LOW BACK PAIN WITH BILATERAL SCIATICA: ICD-10-CM

## 2020-06-11 DIAGNOSIS — Z48.89 AFTERCARE FOLLOWING SURGERY: Primary | ICD-10-CM

## 2020-06-11 DIAGNOSIS — M54.42 ACUTE BILATERAL LOW BACK PAIN WITH BILATERAL SCIATICA: ICD-10-CM

## 2020-06-11 PROCEDURE — 97140 MANUAL THERAPY 1/> REGIONS: CPT

## 2020-06-11 PROCEDURE — 97112 NEUROMUSCULAR REEDUCATION: CPT

## 2020-06-18 ENCOUNTER — APPOINTMENT (OUTPATIENT)
Dept: PHYSICAL THERAPY | Facility: CLINIC | Age: 25
End: 2020-06-18
Payer: COMMERCIAL

## 2020-06-23 ENCOUNTER — OFFICE VISIT (OUTPATIENT)
Dept: PHYSICAL THERAPY | Facility: CLINIC | Age: 25
End: 2020-06-23
Payer: COMMERCIAL

## 2020-06-23 DIAGNOSIS — M54.42 ACUTE BILATERAL LOW BACK PAIN WITH BILATERAL SCIATICA: ICD-10-CM

## 2020-06-23 DIAGNOSIS — M54.41 ACUTE BILATERAL LOW BACK PAIN WITH BILATERAL SCIATICA: ICD-10-CM

## 2020-06-23 DIAGNOSIS — Z48.89 AFTERCARE FOLLOWING SURGERY: Primary | ICD-10-CM

## 2020-06-23 PROCEDURE — 97110 THERAPEUTIC EXERCISES: CPT | Performed by: PHYSICAL THERAPIST

## 2020-06-23 PROCEDURE — 97140 MANUAL THERAPY 1/> REGIONS: CPT | Performed by: PHYSICAL THERAPIST

## 2020-06-23 PROCEDURE — 97112 NEUROMUSCULAR REEDUCATION: CPT | Performed by: PHYSICAL THERAPIST

## 2020-06-25 ENCOUNTER — OFFICE VISIT (OUTPATIENT)
Dept: PHYSICAL THERAPY | Facility: CLINIC | Age: 25
End: 2020-06-25
Payer: COMMERCIAL

## 2020-06-25 DIAGNOSIS — M54.42 ACUTE BILATERAL LOW BACK PAIN WITH BILATERAL SCIATICA: ICD-10-CM

## 2020-06-25 DIAGNOSIS — Z48.89 AFTERCARE FOLLOWING SURGERY: Primary | ICD-10-CM

## 2020-06-25 DIAGNOSIS — M54.41 ACUTE BILATERAL LOW BACK PAIN WITH BILATERAL SCIATICA: ICD-10-CM

## 2020-06-25 PROCEDURE — 97110 THERAPEUTIC EXERCISES: CPT | Performed by: PHYSICAL THERAPIST

## 2020-06-25 PROCEDURE — 97112 NEUROMUSCULAR REEDUCATION: CPT | Performed by: PHYSICAL THERAPIST

## 2020-06-25 PROCEDURE — 97140 MANUAL THERAPY 1/> REGIONS: CPT | Performed by: PHYSICAL THERAPIST

## 2020-06-30 ENCOUNTER — OFFICE VISIT (OUTPATIENT)
Dept: PHYSICAL THERAPY | Facility: CLINIC | Age: 25
End: 2020-06-30
Payer: COMMERCIAL

## 2020-06-30 DIAGNOSIS — M54.42 ACUTE BILATERAL LOW BACK PAIN WITH BILATERAL SCIATICA: ICD-10-CM

## 2020-06-30 DIAGNOSIS — Z48.89 AFTERCARE FOLLOWING SURGERY: Primary | ICD-10-CM

## 2020-06-30 DIAGNOSIS — M54.41 ACUTE BILATERAL LOW BACK PAIN WITH BILATERAL SCIATICA: ICD-10-CM

## 2020-06-30 PROCEDURE — 97140 MANUAL THERAPY 1/> REGIONS: CPT

## 2020-07-08 ENCOUNTER — APPOINTMENT (OUTPATIENT)
Dept: PHYSICAL THERAPY | Facility: CLINIC | Age: 25
End: 2020-07-08
Payer: COMMERCIAL

## 2020-07-09 ENCOUNTER — OFFICE VISIT (OUTPATIENT)
Dept: PHYSICAL THERAPY | Facility: CLINIC | Age: 25
End: 2020-07-09
Payer: COMMERCIAL

## 2020-07-09 DIAGNOSIS — Z48.89 AFTERCARE FOLLOWING SURGERY: Primary | ICD-10-CM

## 2020-07-09 DIAGNOSIS — M54.41 ACUTE BILATERAL LOW BACK PAIN WITH BILATERAL SCIATICA: ICD-10-CM

## 2020-07-09 DIAGNOSIS — M54.42 ACUTE BILATERAL LOW BACK PAIN WITH BILATERAL SCIATICA: ICD-10-CM

## 2020-07-09 PROCEDURE — 97164 PT RE-EVAL EST PLAN CARE: CPT | Performed by: PHYSICAL THERAPIST

## 2020-07-09 PROCEDURE — 97140 MANUAL THERAPY 1/> REGIONS: CPT

## 2020-07-09 PROCEDURE — 97112 NEUROMUSCULAR REEDUCATION: CPT

## 2020-07-09 NOTE — LETTER
2020  PT Reevaluation Padmini Michelle MD  [address] [facility]  [address]    Patient: Bennie Villar   YOB: 1995   Date of Visit: 2020     Encounter Diagnosis     ICD-10-CM    1  Aftercare following surgery Z48 89    2  Acute bilateral low back pain with bilateral sciatica M54 42     M54 41      Dear Dr Elsy Villalpando:  Thank you for your recent referral of Bennie Villar  Please review the attached evaluation summary from [facility] recent visit  Please verify that you agree with the plan of care by signing the attached order  If you have any questions or concerns, please do not hesitate to call  I sincerely appreciate the opportunity to share in the care of one of your patients and hope to have another opportunity to work with you in the near future  Sincerely,    Jose Ha, PT    Referring Provider:      I certify that I have read the below Plan of Care and certify the need for these services furnished under this plan of treatment while under my care  Ya Galvan MD  [facility] 24 In Basket    Please SIGN ABOVE and return THIS PAGE ONLY to Fax # [phone]        Today's date: 2020  Patient name: Bennie Villar  : 1995  MRN: [id number]  Referring provider: Sherren Rede, MD  Dx:   Encounter Diagnosis     ICD-10-CM    1  Aftercare following surgery Z48 89    2  Acute bilateral low back pain with bilateral sciatica M54 42     M54 41      Subjective:  No new c/o pain today  Objective: See treatment log below  Cary Hoffmann continues to be advised to follow her home exercise program as tolerated  When Cary Hoffmann is feeling good she follows her rehab exercises in the gym and on other days she follows her home exercise program at home as tolerated    In the future we plan on having Meka stay at home and follow her home exercise program for four to six weeks and than assess for either more Rehab treatments or discharge from Rehab care  Assessment: Tolerated treatment well  Patient exhibited good technique with therapeutic exercises and would benefit from continued PT to increase ROM/strength and endurance to improve mobility and gait  Meka's goals are to continue to improve with her rehab program and improve with functional mobility, speed, repetition and decreased c/o pain with her gym and home exercise program   [place] final goal for her rehab is to be discharged from Rehab care after obtaining her full functional rehab potential     Plan: Continue per plan of care  Progress treatment as tolerated  Precautions:  Low back pain and bilateral SIJ region pain with Radic on L LE    All treatments below will be provided with a focus on strengthening, flexibility, ROM, postural,   endurance and any possible swelling and pain which may be present without ignoring   neural issues involving balance, coordination and proprioception which is also important   and necessary to provide full functional mobility and quality care  Daily Treatment Log  Manual     MT, ROM 30' 45' 45' 35' 30'   HEP        Exercise Log    Balance Board     30x   Chair Squats        BOSU-Walk        Foam Pad SLR,Hip/KneeFl,Step Ups        Foam Beam        P-Bar-GT-Forward, Backward,Side-Even & Dips        Monster Steps        Fitter-Slalom        T/G-Squats,PF 30x    30x   W/P-PNF,IR,ER,PU,PS:Top,Mid,Bot        NuStep     10' L5   Hilary-BP,Lats,PD,Row        [id number]        NK Table 12 5#-30x    10# 30x   TM        [facility]        Bike        [facility], PE 15' 15' 15' 15' 15'           Modalities    MH&ES NT       US            PT Re-Evaluation   Today's date: 2020  Patient name: Pepper Mcallister  : 1995  MRN: [id number]  Referring provider: Horace Griffith MD  Dx:   Encounter Diagnosis     ICD-10-CM    1  Aftercare following surgery Z48 89    2  Acute bilateral low back pain with bilateral sciatica M54 42     M54 41      Assessment  Assessment details: Patient states she still has a lot of pain today  She is frustrated with her continued pain levels  She was suggested to seek another opinion for pain management  Nancy is trying to perfomr her home exercise program but still has issues  Impairments: abnormal or restricted ROM, abnormal movement, activity intolerance, impaired balance, pain with function, safety issue and weight-bearing intolerance  Understanding of Dx/Px/POC: excellent   Prognosis: good    Goals  STG  2-4 weeks:   Increase lumbar spine AROM by 2-4 degrees  Increase lower extremity strength by 2-4 lbs in all weak areas  Improve sitting posture and body mechanics  Increase standing/ADL tolerance minutes  Decrease pain by 25-50% with standing, walking, and stairs  Initiate HEP  LTG  6-8 weeks:   Demonstrate normal lumbar rotation  Decrease pain to 1-2/10 with activity  Increase lower extremity strength by 10-20 lbs in all weak areas  Improve spinal stability  Improve gait pattern and balance  Decrease limitations with standing, walking and ADL's  DC with HEP  Plan  Plan details: All planned modality interventions and planned therapy interventions are provided PRN    Patient would benefit from: PT eval and skilled physical therapy  Planned modality interventions: TENS, ultrasound and unattended electrical stimulation  Planned therapy interventions: joint mobilization, manual therapy, balance, balance/weight bearing training, neuromuscular re-education, patient education, postural training, self care, strengthening, stretching, therapeutic activities, therapeutic exercise, therapeutic training, transfer training, flexibility, coordination, graded exercise and home exercise program  Frequency: 3x week  Duration in weeks: 12  Treatment plan discussed with: patient      Subjective Evaluation    Pain  Quality: discomfort, knife-like, pulling, pressure, sharp, squeezing, radiating and tight  Aggravating factors: lifting, running, stair climbing, walking, standing and sitting    Treatments  Previous treatment: physical therapy  Current treatment: physical therapy  Patient Goals  Patient goals for therapy: decreased pain, improved balance, increased motion, return to work, return to sport/leisure activities, increased strength and independence with ADLs/IADLs      Date of onset:  9/16/2019    Date of Surgery:  9/16/2020    History of Present Episode: 5/4/2020  Lex Gonzalez states she received surgery to her right knee  Her low back flared up after her knee surgery  She had stopped therapy because of COVID19 Issues and her low back has really flared up and she is now returning  Past Medical History:    5/4/2020  Lex Gonzalez reports Right knee surgery and low back surgery  Asthma  Previous Level of Functional Ability:  5/4/2020  Lex Gonzalez states her knee had been settling down but her back was painful  Limited many activities  Inspection / Palpation:  Lumbar:  5/4/2020  Mesomorphic / Endomorphic body type  No signs of infection  No signs of wounds  No signs of drainage  No signs of ecchymotic regions  No signs of erythremic regions  Moderate to severe signs of muscle spasm  Moderate to severe signs of muscle guarding  Moderate to severe signs of tenderness reported to palpation  No signs of swelling  Positive signs of a surgery site  Current conditions appears consistent with recent acute episode  Chief Complaints:  5/4/2020  Lex Gonzalez reports moderate difficulty with standing  Lex Gonzalez reports moderate difficulty with walking  Lex Gonzalez reports moderate difficulty with movement / use of her lumbar region  Lex Gonzalez reports moderate to severe difficulty with use of stairs  Lex Gonzalez reports severe difficulty with running  Lex Gonzalez reports severe difficulty with jumping    Lex Gonzalez reports moderate to severe difficulty with use of inclines  Paula Batres reports moderate to severe difficulty with sleeping  Paula Batres reports moderate difficulty with her strength and endurance  Paula Batres reports moderate limitations with her range of motion  Paula Batres reports moderate to severe difficulty lying on her lumbar region  Paula Batres reports moderate to severe difficulty twisting / turning her lumbar region      LUMBAR PAIN     Resting Palpation Bending Extending Walking Lifting   5/4/2020 4-8 4-10 4-9 5-9 5-10 5-10   6/4/2020 3-5 3-8 3-8 4-8 4-8 4-8   7/9/2020 3-5 3-8 3-8 4-8 4-8 4-8     LUMBAR PAIN     Pulling Pushing Sleeping Twisting Standing   5/4/2020 4-8 4-10 4-10 4-8 5-10   6/4/2020 3-7 3-8 3-8 3-7 4-8   7/9/2020 3-7 3-8 3-8 3-7 4-8     LUMBAR AROM Flexion Extension Rotation Right   5/4/2020 35° 5° 34°   6/4/2020 39° 7° 37°   7/9/2020 45° 10° 39°     LUMBAR AROM Rotation Left Side Bending Right Side Bending Left   5/4/2020 35° 28° 30°   6/4/2020 38° 31° 33°   7/9/2020 41° 35° 36°     LUMBAR MMT / PAIN Flexion Extension Rotation Right   5/4/2020 4/10  11 lbs 5/10  12 lbs 5/10  8 lbs   6/4/2020 3/10  14 lbs 4/10  15 lbs 4/10  11 lbs   7/9/2020 3/10  15 lbs 4/10  17 lbs 4/10  13 lbs     LUMBAR MMT / PAIN Rotation Left Side Bend Right Side Bend Left   5/4/2020 5/10  7 lbs 4/10  11 lbs 4/10  12 lbs   6/4/2020 4/10  10 lbs 3/10  14 lbs 3/10  15 lbs   7/9/2020 4/10  12 lbs 3/10  15 lbs 3/10  17 lbs      LUMBAR SCREEN Referred Pain Sacroiliac Joint test Squish Test Straight Leg  Raise   5/4/2020 Rt Negative Negative Negative Negative   5/4/2020 Lt POSITIVE POSITIVE Negative POSITIVE     LUMBAR SCREEN Valsalva Gapping Bowstring sign Hip Bursitis   5/4/2020 Rt Negative Negative Negative Negative   5/4/2020 Lt Negative Negative Negative Negative     Precautions:  Low back pain and bilateral SIJ region pain with Radic on L LE

## 2020-07-09 NOTE — PROGRESS NOTES
PT Re-Evaluation   Today's date: 2020  Patient name: Natalie Vivas  : 1995  MRN: [id number]  Referring provider: Forrest Gibbs MD  Dx:   Encounter Diagnosis     ICD-10-CM    1  Aftercare following surgery Z48 89    2  Acute bilateral low back pain with bilateral sciatica M54 42     M54 41      Assessment  Assessment details: Patient states she still has a lot of pain today  She is frustrated with her continued pain levels  She was suggested to seek another opinion for pain management  Seh is trying to perfomr her home exercise program but still has issues  Impairments: abnormal or restricted ROM, abnormal movement, activity intolerance, impaired balance, pain with function, safety issue and weight-bearing intolerance  Understanding of Dx/Px/POC: excellent   Prognosis: good    Goals  STG  2-4 weeks:   Increase lumbar spine AROM by 2-4 degrees  Increase lower extremity strength by 2-4 lbs in all weak areas  Improve sitting posture and body mechanics  Increase standing/ADL tolerance minutes  Decrease pain by 25-50% with standing, walking, and stairs  Initiate HEP  LTG  6-8 weeks:   Demonstrate normal lumbar rotation  Decrease pain to 1-2/10 with activity  Increase lower extremity strength by 10-20 lbs in all weak areas  Improve spinal stability  Improve gait pattern and balance  Decrease limitations with standing, walking and ADL's  DC with HEP  Plan  Plan details: All planned modality interventions and planned therapy interventions are provided PRN    Patient would benefit from: PT eval and skilled physical therapy  Planned modality interventions: TENS, ultrasound and unattended electrical stimulation  Planned therapy interventions: joint mobilization, manual therapy, balance, balance/weight bearing training, neuromuscular re-education, patient education, postural training, self care, strengthening, stretching, therapeutic activities, therapeutic exercise, therapeutic training, transfer training, flexibility, coordination, graded exercise and home exercise program  Frequency: 3x week  Duration in weeks: 12  Treatment plan discussed with: patient      Subjective Evaluation    Pain  Quality: discomfort, knife-like, pulling, pressure, sharp, squeezing, radiating and tight  Aggravating factors: lifting, running, stair climbing, walking, standing and sitting    Treatments  Previous treatment: physical therapy  Current treatment: physical therapy  Patient Goals  Patient goals for therapy: decreased pain, improved balance, increased motion, return to work, return to sport/leisure activities, increased strength and independence with ADLs/IADLs      Date of onset:  9/16/2019    Date of Surgery:  9/16/2020    History of Present Episode: 5/4/2020  Semaj Hassan states she received surgery to her right knee  Her low back flared up after her knee surgery  She had stopped therapy because of COVID19 Issues and her low back has really flared up and she is now returning  Past Medical History:    5/4/2020  Semaj Hassan reports Right knee surgery and low back surgery  Asthma  Previous Level of Functional Ability:  5/4/2020  Semaj Hassan states her knee had been settling down but her back was painful  Limited many activities  Inspection / Palpation:  Lumbar:  5/4/2020  Mesomorphic / Endomorphic body type  No signs of infection  No signs of wounds  No signs of drainage  No signs of ecchymotic regions  No signs of erythremic regions  Moderate to severe signs of muscle spasm  Moderate to severe signs of muscle guarding  Moderate to severe signs of tenderness reported to palpation  No signs of swelling  Positive signs of a surgery site  Current conditions appears consistent with recent acute episode  Chief Complaints:  5/4/2020  Semaj Hassan reports moderate difficulty with standing  Semaj Hassan reports moderate difficulty with walking    Semaj Hassan reports moderate difficulty with movement / use of her lumbar region  Juanita Sparrow reports moderate to severe difficulty with use of stairs  Juanita Sparrow reports severe difficulty with running  Juanita Sparrow reports severe difficulty with jumping  Juanita Sparrow reports moderate to severe difficulty with use of inclines  Juanita Sparrow reports moderate to severe difficulty with sleeping  Juanita Sparrow reports moderate difficulty with her strength and endurance  Juanita Sparrow reports moderate limitations with her range of motion  Juanita Sparrow reports moderate to severe difficulty lying on her lumbar region  Juanita Sparrow reports moderate to severe difficulty twisting / turning her lumbar region      LUMBAR PAIN     Resting Palpation Bending Extending Walking Lifting   5/4/2020 4-8 4-10 4-9 5-9 5-10 5-10   6/4/2020 3-5 3-8 3-8 4-8 4-8 4-8   7/9/2020 3-5 3-8 3-8 4-8 4-8 4-8     LUMBAR PAIN     Pulling Pushing Sleeping Twisting Standing   5/4/2020 4-8 4-10 4-10 4-8 5-10   6/4/2020 3-7 3-8 3-8 3-7 4-8   7/9/2020 3-7 3-8 3-8 3-7 4-8     LUMBAR AROM Flexion Extension Rotation Right   5/4/2020 35° 5° 34°   6/4/2020 39° 7° 37°   7/9/2020 45° 10° 39°     LUMBAR AROM Rotation Left Side Bending Right Side Bending Left   5/4/2020 35° 28° 30°   6/4/2020 38° 31° 33°   7/9/2020 41° 35° 36°     LUMBAR MMT / PAIN Flexion Extension Rotation Right   5/4/2020 4/10  11 lbs 5/10  12 lbs 5/10  8 lbs   6/4/2020 3/10  14 lbs 4/10  15 lbs 4/10  11 lbs   7/9/2020 3/10  15 lbs 4/10  17 lbs 4/10  13 lbs     LUMBAR MMT / PAIN Rotation Left Side Bend Right Side Bend Left   5/4/2020 5/10  7 lbs 4/10  11 lbs 4/10  12 lbs   6/4/2020 4/10  10 lbs 3/10  14 lbs 3/10  15 lbs   7/9/2020 4/10  12 lbs 3/10  15 lbs 3/10  17 lbs      LUMBAR SCREEN Referred Pain Sacroiliac Joint test Squish Test Straight Leg  Raise   5/4/2020 Rt Negative Negative Negative Negative   5/4/2020 Lt POSITIVE POSITIVE Negative POSITIVE     LUMBAR SCREEN Valsalva Gapping Bowstring sign Hip Bursitis   5/4/2020 Rt Negative Negative Negative Negative   5/4/2020 Lt Negative Negative Negative Negative     Precautions:  Low back pain and bilateral SIJ region pain with Radic on L LE

## 2020-07-09 NOTE — PROGRESS NOTES
Today's date: 2020  Patient name: Panda Fernando  : 1995  MRN: [id number]  Referring provider: Caridad Galvan MD  Dx:   Encounter Diagnosis     ICD-10-CM    1  Aftercare following surgery Z48 89    2  Acute bilateral low back pain with bilateral sciatica M54 42     M54 41      Subjective:  No new c/o pain today  Objective: See treatment log below  Becca Barrios continues to be advised to follow her home exercise program as tolerated  When Becca Barrios is feeling good she follows her rehab exercises in the gym and on other days she follows her home exercise program at home as tolerated  In the future we plan on having Meka stay at home and follow her home exercise program for four to six weeks and than assess for either more Rehab treatments or discharge from Rehab care  Assessment: Tolerated treatment well  Patient exhibited good technique with therapeutic exercises and would benefit from continued PT to increase ROM/strength and endurance to improve mobility and gait  Meka's goals are to continue to improve with her rehab program and improve with functional mobility, speed, repetition and decreased c/o pain with her gym and home exercise program   Sheena Runner final goal for her rehab is to be discharged from Rehab care after obtaining her full functional rehab potential     Plan: Continue per plan of care  Progress treatment as tolerated  Precautions:  Low back pain and bilateral SIJ region pain with Radic on L LE    All treatments below will be provided with a focus on strengthening, flexibility, ROM, postural,   endurance and any possible swelling and pain which may be present without ignoring   neural issues involving balance, coordination and proprioception which is also important   and necessary to provide full functional mobility and quality care        Daily Treatment Log  Manual     MT, ROM 30' 45' 45' 35' 30'   HEP        Exercise Log  Balance Board     30x   Chair Squats        BOSU-Walk        Foam Pad SLR,Hip/KneeFl,Step Ups        Foam Beam        P-Bar-GT-Forward, Backward,Side-Even & Dips        Monster Steps        Fitter-Slalom        T/G-Squats,PF 30x    30x   W/P-PNF,IR,ER,PU,PS:Top,Mid,Bot        NuStep     10' L5   Hilary-BP,Lats,PD,Row        [id number]        NK Table 12 5#-30x    10# 30x   TM        [facility]        Bike        Stepper        MAYE Abarca 15' 15' 15' 15' 15'           Modalities 6/11 6/23 6/25 6/30 7/9   MH&ES NT       US

## 2020-07-14 ENCOUNTER — APPOINTMENT (OUTPATIENT)
Dept: PHYSICAL THERAPY | Facility: CLINIC | Age: 25
End: 2020-07-14
Payer: COMMERCIAL

## 2020-07-15 ENCOUNTER — APPOINTMENT (OUTPATIENT)
Dept: PHYSICAL THERAPY | Facility: CLINIC | Age: 25
End: 2020-07-15
Payer: COMMERCIAL

## 2020-07-21 ENCOUNTER — OFFICE VISIT (OUTPATIENT)
Dept: PHYSICAL THERAPY | Facility: CLINIC | Age: 25
End: 2020-07-21
Payer: COMMERCIAL

## 2020-07-21 DIAGNOSIS — Z48.89 AFTERCARE FOLLOWING SURGERY: Primary | ICD-10-CM

## 2020-07-21 DIAGNOSIS — M54.42 ACUTE BILATERAL LOW BACK PAIN WITH BILATERAL SCIATICA: ICD-10-CM

## 2020-07-21 DIAGNOSIS — M54.41 ACUTE BILATERAL LOW BACK PAIN WITH BILATERAL SCIATICA: ICD-10-CM

## 2020-07-21 PROCEDURE — 97014 ELECTRIC STIMULATION THERAPY: CPT | Performed by: PHYSICAL THERAPIST

## 2020-07-21 PROCEDURE — G0283 ELEC STIM OTHER THAN WOUND: HCPCS | Performed by: PHYSICAL THERAPIST

## 2020-07-21 PROCEDURE — 97140 MANUAL THERAPY 1/> REGIONS: CPT

## 2020-07-21 NOTE — PROGRESS NOTES
Today's date: 2020  Patient name: Dia Valderrama  : 1995  MRN: [id number]  Referring provider: Mayela Johnson MD  Dx:   Encounter Diagnosis     ICD-10-CM    1  Aftercare following surgery Z48 89    2  Acute bilateral low back pain with bilateral sciatica M54 42     M54 41      Subjective:  "I had a lot of pain last week in my back "    Objective: See treatment log below  David Allen continues to be advised to follow her home exercise program as tolerated  When David Allen is feeling good she follows her rehab exercises in the gym and on other days she follows her home exercise program at home as tolerated  In the future we plan on having Meka stay at home and follow her home exercise program for four to six weeks and than assess for either more Rehab treatments or discharge from Rehab care  Assessment: Tolerated treatment well  Patient exhibited good technique with therapeutic exercises and would benefit from continued PT to decrease spasm/tightness to improve mobility  Meka's goals are to continue to improve with her rehab program and improve with functional mobility, speed, repetition and decreased c/o pain with her gym and home exercise program   Delvis Fischer final goal for her rehab is to be discharged from Rehab care after obtaining her full functional rehab potential     Plan: Continue per plan of care  Progress treatment as tolerated  Precautions:  Low back pain and bilateral SIJ region pain with Radic on L LE    All treatments below will be provided with a focus on strengthening, flexibility, ROM, postural,   endurance and any possible swelling and pain which may be present without ignoring   neural issues involving balance, coordination and proprioception which is also important   and necessary to provide full functional mobility and quality care        Daily Treatment Log  Manual     MT, ROM 35'    30'   HEP        Exercise Log    Balance Board     30x   Chair Squats        BOSU-Walk        Foam Pad SLR,Hip/KneeFl,Step Ups        Foam Beam        P-Bar-GT-Forward, Backward,Side-Even & Dips        Monster Steps        Fitter-Slalom        T/G-Squats,PF     30x   W/P-PNF,IR,ER,PU,PS:Top,Mid,Bot        NuStep     10' L5   Hilary-BP,Lats,PD,Row        [id number]        NK Table     10# 30x   TM        [facility]        Bike        Stepper        MAYE Abarca 15'    15'           Modalities 7/21 7/9   MH&ES 20'       US

## 2020-07-22 ENCOUNTER — OFFICE VISIT (OUTPATIENT)
Dept: PHYSICAL THERAPY | Facility: CLINIC | Age: 25
End: 2020-07-22
Payer: COMMERCIAL

## 2020-07-22 DIAGNOSIS — M54.41 ACUTE BILATERAL LOW BACK PAIN WITH BILATERAL SCIATICA: ICD-10-CM

## 2020-07-22 DIAGNOSIS — M54.42 ACUTE BILATERAL LOW BACK PAIN WITH BILATERAL SCIATICA: ICD-10-CM

## 2020-07-22 DIAGNOSIS — Z48.89 AFTERCARE FOLLOWING SURGERY: Primary | ICD-10-CM

## 2020-07-22 PROCEDURE — 97112 NEUROMUSCULAR REEDUCATION: CPT

## 2020-07-22 PROCEDURE — 97140 MANUAL THERAPY 1/> REGIONS: CPT

## 2020-07-22 NOTE — PROGRESS NOTES
Today's date: 2020  Patient name: Marcie Yin  : 1995  MRN: [id number]  Referring provider: Faviola Tolentino MD  Dx:   Encounter Diagnosis     ICD-10-CM    1  Aftercare following surgery Z48 89    2  Acute bilateral low back pain with bilateral sciatica M54 42     M54 41      Subjective:  No new c/o pain today  Objective: See treatment log below  Nell Gosselin continues to be advised to follow her home exercise program as tolerated  When Nell Gosselin is feeling good she follows her rehab exercises in the gym and on other days she follows her home exercise program at home as tolerated  In the future we plan on having Meka stay at home and follow her home exercise program for four to six weeks and than assess for either more Rehab treatments or discharge from Rehab care  Assessment: Tolerated treatment well  Patient exhibited good technique with therapeutic exercises and would benefit from continued PT to increase ROM/strength and endurance to improve mobility  Meka's goals are to continue to improve with her rehab program and improve with functional mobility, speed, repetition and decreased c/o pain with her gym and home exercise program   Krys Charlton final goal for her rehab is to be discharged from Rehab care after obtaining her full functional rehab potential     Plan: Continue per plan of care  Progress treatment as tolerated  Precautions:  Low back pain and bilateral SIJ region pain with Radic on L LE    All treatments below will be provided with a focus on strengthening, flexibility, ROM, postural,   endurance and any possible swelling and pain which may be present without ignoring   neural issues involving balance, coordination and proprioception which is also important   and necessary to provide full functional mobility and quality care        Daily Treatment Log  Manual        MT, ROM 35' 30'      HEP        Exercise Log       Balance Board  30x      Chair Squats BOSU-Walk        Foam Pad SLR,Hip/KneeFl,Step Ups        Foam Beam        P-Bar-GT-Forward, Backward,Side-Even & Dips        Monster Steps        Fitter-Slalom        T/G-Squats,PF  30x      W/P-PNF,IR,ER,PU,PS:Top,Mid,Bot        NuStep  L5 10'      Hilary-BP,Lats,PD,Row        [id number]-[id number]-[id number]        NK Table  10# 30x      TM        [facility]        Bike        [facility], PE 15' 15'              Modalities 7/21 7/22      MH&ES 20' [address] NT      US

## 2020-08-06 ENCOUNTER — APPOINTMENT (OUTPATIENT)
Dept: PHYSICAL THERAPY | Facility: CLINIC | Age: 25
End: 2020-08-06
Payer: COMMERCIAL

## 2020-08-07 ENCOUNTER — OFFICE VISIT (OUTPATIENT)
Dept: PHYSICAL THERAPY | Facility: CLINIC | Age: 25
End: 2020-08-07
Payer: COMMERCIAL

## 2020-08-07 DIAGNOSIS — M54.41 ACUTE BILATERAL LOW BACK PAIN WITH BILATERAL SCIATICA: ICD-10-CM

## 2020-08-07 DIAGNOSIS — M54.42 ACUTE BILATERAL LOW BACK PAIN WITH BILATERAL SCIATICA: ICD-10-CM

## 2020-08-07 DIAGNOSIS — Z48.89 AFTERCARE FOLLOWING SURGERY: Primary | ICD-10-CM

## 2020-08-07 PROCEDURE — 97140 MANUAL THERAPY 1/> REGIONS: CPT

## 2020-08-07 PROCEDURE — 97112 NEUROMUSCULAR REEDUCATION: CPT

## 2020-08-07 NOTE — PROGRESS NOTES
Today's date: 2020  Patient name: Panda Fernando  : 1995  MRN: [id number]  Referring provider: Caridad Galvan MD  Dx:   Encounter Diagnosis     ICD-10-CM    1  Aftercare following surgery  Z48 89    2  Acute bilateral low back pain with bilateral sciatica  M54 42     M54 41      Subjective:  "I'm just sore all over "    Objective: See treatment log below  Becca Barrios continues to be advised to follow her home exercise program as tolerated  When Becca Barrios is feeling good she follows her rehab exercises in the gym and on other days she follows her home exercise program at home as tolerated  In the future we plan on having Meka stay at home and follow her home exercise program for four to six weeks and than assess for either more Rehab treatments or discharge from Rehab care  Assessment: Tolerated treatment well  Patient exhibited good technique with therapeutic exercises and would benefit from continued PT to increase ROM/strength and endurance to improve mobility  Meka's goals are to continue to improve with her rehab program and improve with functional mobility, speed, repetition and decreased c/o pain with her gym and home exercise program   Sheena Runner final goal for her rehab is to be discharged from Rehab care after obtaining her full functional rehab potential     Plan: Continue per plan of care  Progress treatment as tolerated  Precautions:  Low back pain and bilateral SIJ region pain with Radic on L LE    All treatments below will be provided with a focus on strengthening, flexibility, ROM, postural,   endurance and any possible swelling and pain which may be present without ignoring   neural issues involving balance, coordination and proprioception which is also important   and necessary to provide full functional mobility and quality care        Daily Treatment Log  Manual       MT, ROM 35' 30' 35'     HEP        Exercise Log      Balance Board  30x 30x Chair Squats        BOSU-Walk        Foam Pad SLR,Hip/KneeFl,Step Ups        Foam Beam        P-Bar-GT-Forward, Backward,Side-Even & Dips        Monster Steps        Fitter-Slalom        T/G-Squats,PF  30x 30x     W/P-PNF,IR,ER,PU,PS:Top,Mid,Bot        NuStep  L5 10' L5 10'     Hilary-BP,Lats,PD,Row        Kkekvcl-WP-Xa        NK Table  10# 30x NT     TM        UBC        Bike   10'     Stepper        [place], PE 15' 15' 15'             Modalities 7/21 7/22 8/7     MH&ES 20' [facility] NT NT     US

## 2020-08-12 ENCOUNTER — OFFICE VISIT (OUTPATIENT)
Dept: PHYSICAL THERAPY | Facility: CLINIC | Age: 25
End: 2020-08-12
Payer: COMMERCIAL

## 2020-08-12 DIAGNOSIS — M54.41 ACUTE BILATERAL LOW BACK PAIN WITH BILATERAL SCIATICA: ICD-10-CM

## 2020-08-12 DIAGNOSIS — Z48.89 AFTERCARE FOLLOWING SURGERY: Primary | ICD-10-CM

## 2020-08-12 DIAGNOSIS — M54.42 ACUTE BILATERAL LOW BACK PAIN WITH BILATERAL SCIATICA: ICD-10-CM

## 2020-08-12 PROCEDURE — 97140 MANUAL THERAPY 1/> REGIONS: CPT

## 2020-08-12 PROCEDURE — 97164 PT RE-EVAL EST PLAN CARE: CPT | Performed by: PHYSICAL THERAPIST

## 2020-08-12 PROCEDURE — 97112 NEUROMUSCULAR REEDUCATION: CPT

## 2020-08-12 NOTE — PROGRESS NOTES
PT Re-Evaluation   Today's date: 2020  Patient name: Nupur Gamez  : 1995  MRN: [id number]  Referring provider: Dimitrios Barlow MD  Dx:   Encounter Diagnosis     ICD-10-CM    1  Aftercare following surgery  Z48 89    2  Acute bilateral low back pain with bilateral sciatica  M54 42     M54 41      Assessment  Assessment details: Patient states her back is still sore but she is feeling better  She has more pain free movements before she reaches end of range and than her back flares up  She can stand longer before her back begins to flare up and her pain level increases  She can walk further before her back flares up  Impairments: abnormal or restricted ROM, abnormal movement, activity intolerance, impaired balance, impaired physical strength, pain with function and safety issue  Understanding of Dx/Px/POC: excellent   Prognosis: good    Goals  STG  2-4 weeks:   Increase lumbar spine AROM by 2-4 degrees  Increase lower extremity strength by 2-4 lbs in all weak areas  Improve sitting posture and body mechanics  Increase standing/ADL tolerance minutes  Decrease pain by 25-50% with standing, walking, and stairs  Initiate HEP  LTG  6-8 weeks:   Demonstrate normal lumbar rotation  Decrease pain to 1-2/10 with activity  Increase lower extremity strength by 10-20 lbs in all weak areas  Improve spinal stability  Improve gait pattern and balance  Decrease limitations with standing, walking and ADL's  DC with HEP  Plan  Plan details: All planned modality interventions and planned therapy interventions are provided PRN    Patient would benefit from: PT eval and skilled physical therapy  Planned modality interventions: TENS, ultrasound and unattended electrical stimulation  Planned therapy interventions: joint mobilization, manual therapy, neuromuscular re-education, patient education, postural training, strengthening, stretching, coordination, balance, balance/weight bearing training, therapeutic activities, therapeutic exercise, therapeutic training, transfer training, home exercise program, graded exercise, gait training and flexibility  Frequency: 3x week  Duration in weeks: 12  Treatment plan discussed with: patient      Subjective Evaluation    Pain  Quality: discomfort, knife-like, pulling, sharp, tight and radiating  Relieving factors: medications, change in position, relaxation, rest and support  Aggravating factors: lifting, running, stair climbing, walking and standing  Progression: improved    Treatments  Previous treatment: physical therapy  Current treatment: physical therapy  Patient Goals  Patient goals for therapy: decreased pain, increased motion, improved balance, return to work, return to [employer] activities, independence with ADLs/IADLs and increased strength      Date of onset:  9/16/2019    Date of Surgery:  9/16/2020    History of Present Episode: 5/4/2020  Annyjose luisgustavo Chao states she received surgery to her right knee  Her low back flared up after her knee surgery  She had stopped therapy because of COVID19 Issues and her low back has really flared up and she is now returning  Past Medical History:    5/4/2020  Yoav Chao reports Right knee surgery and low back surgery  Asthma  Previous Level of Functional Ability:  5/4/2020  Yoav Chao states her knee had been settling down but her back was painful  Limited many activities  Inspection / Palpation:  Lumbar:  5/4/2020  Mesomorphic / Endomorphic body type  No signs of infection  No signs of wounds  No signs of drainage  No signs of ecchymotic regions  No signs of erythremic regions  Moderate to severe signs of muscle spasm  Moderate to severe signs of muscle guarding  Moderate to severe signs of tenderness reported to palpation  No signs of swelling  Positive signs of a surgery site  Current conditions appears consistent with recent acute episode      Chief Complaints:  5/4/2020  Yoav Chao reports moderate difficulty with standing  Sallie Escalona reports moderate difficulty with walking  Sallie Escalona reports moderate difficulty with movement / use of her lumbar region  Sallie Escalona reports moderate to severe difficulty with use of stairs  Sallie Escalona reports severe difficulty with running  Sallie Escalona reports severe difficulty with jumping  Sallie Escalona reports moderate to severe difficulty with use of inclines  Sallie Escalona reports moderate to severe difficulty with sleeping  Sallie Escalona reports moderate difficulty with her strength and endurance  Sallie Escalona reports moderate limitations with her range of motion  Sallie Escalona reports moderate to severe difficulty lying on her lumbar region  Sallie Escalona reports moderate to severe difficulty twisting / turning her lumbar region      LUMBAR PAIN     Resting Palpation Bending Extending Walking Lifting   5/4/2020 4-8 4-10 4-9 5-9 5-10 5-10   6/4/2020 3-5 3-8 3-8 4-8 4-8 4-8   7/9/2020 3-5 3-8 3-8 4-8 4-8 4-8   8/12/2020 2-5 2-7 2-7 3-7 3-7 3-7     LUMBAR PAIN     Pulling Pushing Sleeping Twisting Standing   5/4/2020 4-8 4-10 4-10 4-8 5-10   6/4/2020 3-7 3-8 3-8 3-7 4-8   7/9/2020 3-7 3-8 3-8 3-7 4-8   8/12/2020 2-6 2-7 2-7 2-6 3-7     LUMBAR AROM Flexion Extension Rotation Right   5/4/2020 35° 5° 34°   6/4/2020 39° 7° 37°   7/9/2020 45° 10° 39°   8/12/2020 51° 10° 41°     LUMBAR AROM Rotation Left Side Bending Right Side Bending Left   5/4/2020 35° 28° 30°   6/4/2020 38° 31° 33°   7/9/2020 41° 35° 36°   8/12/2020 43° 38° 39°     LUMBAR MMT / PAIN Flexion Extension Rotation Right   5/4/2020 4/10  11 lbs 5/10  12 lbs 5/10  8 lbs   6/4/2020 3/10  14 lbs 4/10  15 lbs 4/10  11 lbs   7/9/2020 3/10  15 lbs 4/10  17 lbs 4/10  13 lbs   8/12/2020 2/10  18 lbs 3/10  20 lbs 3/10  15 lbs     LUMBAR MMT / PAIN Rotation Left Side Bend Right Side Bend Left   5/4/2020 5/10  7 lbs 4/10  11 lbs 4/10  12 lbs   6/4/2020 4/10  10 lbs 3/10  14 lbs 3/10  15 lbs   7/9/2020 4/10  12 lbs 3/10  15 lbs 3/10  17 lbs   8/12/2020 3/10  15 lbs 2/10  17 lbs 2/10  20 lbs      LUMBAR SCREEN Referred Pain Sacroiliac Joint test Squish Test Straight Leg  Raise   5/4/2020 Rt Negative Negative Negative Negative   5/4/2020 Lt POSITIVE POSITIVE Negative POSITIVE     LUMBAR SCREEN Valsalva Gapping Bowstring sign Hip Bursitis   5/4/2020 Rt Negative Negative Negative Negative   5/4/2020 Lt Negative Negative Negative Negative     Precautions:  Low back pain and bilateral SIJ region pain with Radic on L LE

## 2020-08-12 NOTE — PROGRESS NOTES
Today's date: 2020  Patient name: Lexi Ng  : 1995  MRN: [id number]  Referring provider: Ronald Olivas MD  Dx:   Encounter Diagnosis     ICD-10-CM    1  Aftercare following surgery  Z48 89    2  Acute bilateral low back pain with bilateral sciatica  M54 42     M54 41      Subjective:  No new c/o pain today  Objective: See treatment log below  Leticia Perry continues to be advised to follow her home exercise program as tolerated  When Leticia Perry is feeling good she follows her rehab exercises in the gym and on other days she follows her home exercise program at home as tolerated  In the future we plan on having Meka stay at home and follow her home exercise program for four to six weeks and than assess for either more Rehab treatments or discharge from Rehab care  Assessment: Tolerated treatment well  Patient exhibited good technique with therapeutic exercises and would benefit from continued PT to increase ROM/strength and endurance to improve mobility  Meka's goals are to continue to improve with her rehab program and improve with functional mobility, speed, repetition and decreased c/o pain with her gym and home exercise program   Daxa Gimenez final goal for her rehab is to be discharged from Rehab care after obtaining her full functional rehab potential     Plan: Continue per plan of care  Progress treatment as tolerated  Precautions:  Low back pain and bilateral SIJ region pain with Radic on L LE    All treatments below will be provided with a focus on strengthening, flexibility, ROM, postural,   endurance and any possible swelling and pain which may be present without ignoring   neural issues involving balance, coordination and proprioception which is also important   and necessary to provide full functional mobility and quality care        Daily Treatment Log  Manual      MT, ROM 35' 30' 35' 35'    HEP        Exercise Log     Balance Board  30x 30x NT    Chair Squats        BOSU-Walk        Foam Pad SLR,Hip/KneeFl,Step Ups        Foam Beam        P-Bar-GT-Forward, Backward,Side-Even & Dips        Monster Steps        Fitter-Slalom        T/G-Squats,PF  30x 30x NT    W/P-PNF,IR,ER,PU,PS:Top,Mid,Bot        NuStep  L5 10' L5 10' NT    Hilary-BP,Lats,PD,Row        Iowdfyq-VM-Iw        NK Table  10# 30x NT 10# 30x    TM        UBC    10'    Bike   10' NT    Stepper        [place], PE 15' 15' 15' 15'            Modalities 7/21 7/22 8/7 8/12    MH&ES 20' [address] NT NT NT    US

## 2020-08-12 NOTE — LETTER
2020  PT Reevaluation Padmini Michelle MD  [facility]  [address]    Patient: Neal Sotelo   YOB: 1995   Date of Visit: 2020     Encounter Diagnosis     ICD-10-CM    1  Aftercare following surgery  Z48 89    2  Acute bilateral low back pain with bilateral sciatica  M54 42     M54 41      Dear Dr Teri Steward:  Thank you for your recent referral of Neal Sotelo  Please review the attached evaluation summary from [facility] recent visit  Please verify that you agree with the plan of care by signing the attached order  If you have any questions or concerns, please do not hesitate to call  I sincerely appreciate the opportunity to share in the care of one of your patients and hope to have another opportunity to work with you in the near future  Sincerely,    Davide Aguilar, PT    Referring Provider:      I certify that I have read the below Plan of Care and certify the need for these services furnished under this plan of treatment while under my care  Shayy Groves MD  [facility]  [facility] [id number]  VIA In Basket    Please SIGN ABOVE and return THIS PAGE ONLY to Fax # [phone]        Today's date: 2020  Patient name: Neal Sotelo  : 1995  MRN: [id number]  Referring provider: David Wood MD  Dx:   Encounter Diagnosis     ICD-10-CM    1  Aftercare following surgery  Z48 89    2  Acute bilateral low back pain with bilateral sciatica  M54 42     M54 41      Subjective:  No new c/o pain today  Objective: See treatment log below  Rossy Gerard continues to be advised to follow her home exercise program as tolerated  When Rossy Gerard is feeling good she follows her rehab exercises in the gym and on other days she follows her home exercise program at home as tolerated    In the future we plan on having Meka stay at home and follow her home exercise program for four to six weeks and than assess for either more Rehab treatments or discharge from Rehab care  Assessment: Tolerated treatment well  Patient exhibited good technique with therapeutic exercises and would benefit from continued PT to increase ROM/strength and endurance to improve mobility  Meka's goals are to continue to improve with her rehab program and improve with functional mobility, speed, repetition and decreased c/o pain with her gym and home exercise program   [place] final goal for her rehab is to be discharged from Rehab care after obtaining her full functional rehab potential     Plan: Continue per plan of care  Progress treatment as tolerated  Precautions:  Low back pain and bilateral SIJ region pain with Radic on L LE    All treatments below will be provided with a focus on strengthening, flexibility, ROM, postural,   endurance and any possible swelling and pain which may be present without ignoring   neural issues involving balance, coordination and proprioception which is also important   and necessary to provide full functional mobility and quality care  Daily Treatment Log  Manual  7/21 7/22 8/7 8/12    MT, ROM 35' 30' 35' 35'    HEP        Exercise Log 7/21 7/22 8/7 8/12    Balance Board  30x 30x NT    Chair Squats        BOSU-Walk        Foam Pad SLR,Hip/KneeFl,Step Ups        Foam Beam        P-Bar-GT-Forward, Backward,Side-Even & Dips        Monster Steps        Fitter-Slalom        T/G-Squats,PF  30x 30x NT    W/P-PNF,IR,ER,PU,PS:Top,Mid,Bot        NuStep  L5 10' L5 10' NT    Hilary-BP,Lats,PD,Row        Sdnptyy-DO-Mu        NK Table  10# 30x NT 10# 30x    TM        UBC    10'    Bike   10' NT    Stepper        [place], PE 15' 15' 15' 15'            Modalities 7/21 7/22 8/7 8/12    MH&ES 20' [address] NT NT NT    US            Attestation signed by Aditya Ferreira PT at 8/12/2020 12:10 PM:  I supervised the visit  We discussed the case to ensure appropriate continuation and progression of care and I reviewed the documentation       PT Re-Evaluation Today's date: 2020  Patient name: Davin Dennis  : 1995  MRN: [id number]  Referring provider: Ondina Fields MD  Dx:   Encounter Diagnosis     ICD-10-CM    1  Aftercare following surgery  Z48 89    2  Acute bilateral low back pain with bilateral sciatica  M54 42     M54 41      Assessment  Assessment details: Patient states her back is still sore but she is feeling better  She has more pain free movements before she reaches end of range and than her back flares up  She can stand longer before her back begins to flare up and her pain level increases  She can walk further before her back flares up  Impairments: abnormal or restricted ROM, abnormal movement, activity intolerance, impaired balance, impaired physical strength, pain with function and safety issue  Understanding of Dx/Px/POC: excellent   Prognosis: good    Goals  STG  2-4 weeks:   Increase lumbar spine AROM by 2-4 degrees  Increase lower extremity strength by 2-4 lbs in all weak areas  Improve sitting posture and body mechanics  Increase standing/ADL tolerance minutes  Decrease pain by 25-50% with standing, walking, and stairs  Initiate HEP  LTG  6-8 weeks:   Demonstrate normal lumbar rotation  Decrease pain to 1-2/10 with activity  Increase lower extremity strength by 10-20 lbs in all weak areas  Improve spinal stability  Improve gait pattern and balance  Decrease limitations with standing, walking and ADL's  DC with HEP  Plan  Plan details: All planned modality interventions and planned therapy interventions are provided PRN    Patient would benefit from: PT eval and skilled physical therapy  Planned modality interventions: TENS, ultrasound and unattended electrical stimulation  Planned therapy interventions: joint mobilization, manual therapy, neuromuscular re-education, patient education, postural training, strengthening, stretching, coordination, balance, balance/weight bearing training, therapeutic activities, therapeutic exercise, therapeutic training, transfer training, home exercise program, graded exercise, gait training and flexibility  Frequency: 3x week  Duration in weeks: 12  Treatment plan discussed with: patient      Subjective Evaluation    Pain  Quality: discomfort, knife-like, pulling, sharp, tight and radiating  Relieving factors: medications, change in position, relaxation, rest and support  Aggravating factors: lifting, running, stair climbing, walking and standing  Progression: improved    Treatments  Previous treatment: physical therapy  Current treatment: physical therapy  Patient Goals  Patient goals for therapy: decreased pain, increased motion, improved balance, return to work, return to [employer] activities, independence with ADLs/IADLs and increased strength      Date of onset:  9/16/2019    Date of Surgery:  9/16/2020    History of Present Episode: 5/4/2020  Mckenna Cedillo states she received surgery to her right knee  Her low back flared up after her knee surgery  She had stopped therapy because of COVID19 Issues and her low back has really flared up and she is now returning  Past Medical History:    5/4/2020  Mckenna Cedillo reports Right knee surgery and low back surgery  Asthma  Previous Level of Functional Ability:  5/4/2020  Mckenna Cedillo states her knee had been settling down but her back was painful  Limited many activities  Inspection / Palpation:  Lumbar:  5/4/2020  Mesomorphic / Endomorphic body type  No signs of infection  No signs of wounds  No signs of drainage  No signs of ecchymotic regions  No signs of erythremic regions  Moderate to severe signs of muscle spasm  Moderate to severe signs of muscle guarding  Moderate to severe signs of tenderness reported to palpation  No signs of swelling  Positive signs of a surgery site  Current conditions appears consistent with recent acute episode      Chief Complaints:  5/4/2020  Mckenna Cedillo reports moderate difficulty with standing  Ty Hernandez reports moderate difficulty with walking  Ty Hernandez reports moderate difficulty with movement / use of her lumbar region  Ty Hernandez reports moderate to severe difficulty with use of stairs  Ty Hernandez reports severe difficulty with running  Ty Hernandez reports severe difficulty with jumping  Ty Hernandez reports moderate to severe difficulty with use of inclines  Ty Hernandez reports moderate to severe difficulty with sleeping  Ty Hernandez reports moderate difficulty with her strength and endurance  Ty Hernandez reports moderate limitations with her range of motion  Ty Hernandez reports moderate to severe difficulty lying on her lumbar region  Ty Hernandez reports moderate to severe difficulty twisting / turning her lumbar region      LUMBAR PAIN     Resting Palpation Bending Extending Walking Lifting   5/4/2020 4-8 4-10 4-9 5-9 5-10 5-10   6/4/2020 3-5 3-8 3-8 4-8 4-8 4-8   7/9/2020 3-5 3-8 3-8 4-8 4-8 4-8   8/12/2020 2-5 2-7 2-7 3-7 3-7 3-7     LUMBAR PAIN     Pulling Pushing Sleeping Twisting Standing   5/4/2020 4-8 4-10 4-10 4-8 5-10   6/4/2020 3-7 3-8 3-8 3-7 4-8   7/9/2020 3-7 3-8 3-8 3-7 4-8   8/12/2020 2-6 2-7 2-7 2-6 3-7     LUMBAR AROM Flexion Extension Rotation Right   5/4/2020 35° 5° 34°   6/4/2020 39° 7° 37°   7/9/2020 45° 10° 39°   8/12/2020 51° 10° 41°     LUMBAR AROM Rotation Left Side Bending Right Side Bending Left   5/4/2020 35° 28° 30°   6/4/2020 38° 31° 33°   7/9/2020 41° 35° 36°   8/12/2020 43° 38° 39°     LUMBAR MMT / PAIN Flexion Extension Rotation Right   5/4/2020 4/10  11 lbs 5/10  12 lbs 5/10  8 lbs   6/4/2020 3/10  14 lbs 4/10  15 lbs 4/10  11 lbs   7/9/2020 3/10  15 lbs 4/10  17 lbs 4/10  13 lbs   8/12/2020 2/10  18 lbs 3/10  20 lbs 3/10  15 lbs     LUMBAR MMT / PAIN Rotation Left Side Bend Right Side Bend Left   5/4/2020 5/10  7 lbs 4/10  11 lbs 4/10  12 lbs   6/4/2020 4/10  10 lbs 3/10  14 lbs 3/10  15 lbs   7/9/2020 4/10  12 lbs 3/10  15 lbs 3/10  17 lbs   8/12/2020 3/10  15 lbs 2/10  17 lbs 2/10  20 lbs LUMBAR SCREEN Referred Pain Sacroiliac Joint test Squish Test Straight Leg  Raise   5/4/2020 Rt Negative Negative Negative Negative   5/4/2020 Lt POSITIVE POSITIVE Negative POSITIVE     LUMBAR SCREEN Valsalva Gapping Bowstring sign Hip Bursitis   5/4/2020 Rt Negative Negative Negative Negative   5/4/2020 Lt Negative Negative Negative Negative     Precautions:  Low back pain and bilateral SIJ region pain with Radic on L LE

## 2020-08-14 ENCOUNTER — OFFICE VISIT (OUTPATIENT)
Dept: PHYSICAL THERAPY | Facility: CLINIC | Age: 25
End: 2020-08-14
Payer: COMMERCIAL

## 2020-08-14 DIAGNOSIS — M54.41 ACUTE BILATERAL LOW BACK PAIN WITH BILATERAL SCIATICA: ICD-10-CM

## 2020-08-14 DIAGNOSIS — Z48.89 AFTERCARE FOLLOWING SURGERY: Primary | ICD-10-CM

## 2020-08-14 DIAGNOSIS — M54.42 ACUTE BILATERAL LOW BACK PAIN WITH BILATERAL SCIATICA: ICD-10-CM

## 2020-08-14 PROCEDURE — 97140 MANUAL THERAPY 1/> REGIONS: CPT

## 2020-08-14 PROCEDURE — 97112 NEUROMUSCULAR REEDUCATION: CPT

## 2020-08-14 NOTE — PROGRESS NOTES
Today's date: 2020  Patient name: Pepper Mcallister  : 1995  MRN: [id number]  Referring provider: Horace Griffith MD  Dx:   Encounter Diagnosis     ICD-10-CM    1  Aftercare following surgery  Z48 89    2  Acute bilateral low back pain with bilateral sciatica  M54 42     M54 41      Subjective:  No new c/o pain today  Objective: See treatment log below  Nanci Cook continues to be advised to follow her home exercise program as tolerated  When Nanci Cook is feeling good she follows her rehab exercises in the gym and on other days she follows her home exercise program at home as tolerated  In the future we plan on having Meka stay at home and follow her home exercise program for four to six weeks and than assess for either more Rehab treatments or discharge from Rehab care  Assessment: Tolerated treatment well  Patient exhibited good technique with therapeutic exercises and would benefit from continued PT  Meka's goals are to continue to improve with her rehab program and improve with functional mobility, speed, repetition and decreased c/o pain with her gym and home exercise program   [place] final goal for her rehab is to be discharged from [address] [place] [address] after obtaining her full functional rehab potential     Plan: Continue per plan of care  Progress treatment as tolerated  Precautions:  Low back pain and bilateral SIJ region pain with Radic on L LE    All treatments below will be provided with a focus on strengthening, flexibility, ROM, postural,   endurance and any possible swelling and pain which may be present without ignoring   neural issues involving balance, coordination and proprioception which is also important   and necessary to provide full functional mobility and quality care        Daily Treatment Log  Manual     MT, ROM 35' 30' 35' 35' 20'   HEP        Exercise Log    Balance Board  30x 30x NT    Chair Squats        BOSU-Walk Foam Pad SLR,Hip/KneeFl,Step Ups        Foam Beam        P-Bar-GT-Forward, Backward,Side-Even & Dips        Monster Steps        Fitter-Slalom        T/G-Squats,PF  30x 30x NT 30x   W/P-PNF,IR,ER,PU,PS:Top,Mid,Bot        NuStep  L5 10' L5 10' NT L5 10'   Hilary-BP,Lats,PD,Row        Tpqonwg-SI-Oq        NK Table  10# 30x NT 10# 30x    TM        UBC    10' 10'   Bike   10' NT 10'   Stepper        ME, PE 13' 15' 15' 15' 15'           Modalities 7/21 7/22 8/7 8/12 8/14   MH&ES 20' [address] NT NT NT    US

## 2020-08-18 ENCOUNTER — APPOINTMENT (OUTPATIENT)
Dept: PHYSICAL THERAPY | Facility: CLINIC | Age: 25
End: 2020-08-18
Payer: COMMERCIAL

## 2020-08-20 ENCOUNTER — OFFICE VISIT (OUTPATIENT)
Dept: PHYSICAL THERAPY | Facility: CLINIC | Age: 25
End: 2020-08-20
Payer: COMMERCIAL

## 2020-08-20 DIAGNOSIS — M54.42 ACUTE BILATERAL LOW BACK PAIN WITH BILATERAL SCIATICA: ICD-10-CM

## 2020-08-20 DIAGNOSIS — M54.41 ACUTE BILATERAL LOW BACK PAIN WITH BILATERAL SCIATICA: ICD-10-CM

## 2020-08-20 DIAGNOSIS — Z48.89 AFTERCARE FOLLOWING SURGERY: Primary | ICD-10-CM

## 2020-08-20 PROCEDURE — 97112 NEUROMUSCULAR REEDUCATION: CPT

## 2020-08-20 PROCEDURE — 97140 MANUAL THERAPY 1/> REGIONS: CPT

## 2020-08-20 NOTE — PROGRESS NOTES
Today's date: 2020  Patient name: Daija Márquez  : 1995  MRN: [id number]  Referring provider: Gillian Vazquez MD  Dx:   Encounter Diagnosis     ICD-10-CM    1  Aftercare following surgery  Z48 89    2  Acute bilateral low back pain with bilateral sciatica  M54 42     M54 41      Subjective:  No new c/o pain today  Objective: See treatment log below  Kari Kirk continues to be advised to follow her home exercise program as tolerated  When Kari Kirk is feeling good she follows her rehab exercises in the gym and on other days she follows her home exercise program at home as tolerated  In the future we plan on having Meka stay at home and follow her home exercise program for four to six weeks and than assess for either more Rehab treatments or discharge from Rehab care  Assessment: Tolerated treatment well  Patient exhibited good technique with therapeutic exercises and would benefit from continued PT to increase ROM/strength and endurance to improve mobility  Meka's goals are to continue to improve with her rehab program and improve with functional mobility, speed, repetition and decreased c/o pain with her gym and home exercise program   Stef Trevino final goal for her rehab is to be discharged from Rehab care after obtaining her full functional rehab potential     Plan: Continue per plan of care  Progress treatment as tolerated  Precautions:  Low back pain and bilateral SIJ region pain with Radic on L LE    All treatments below will be provided with a focus on strengthening, flexibility, ROM, postural,   endurance and any possible swelling and pain which may be present without ignoring   neural issues involving balance, coordination and proprioception which is also important   and necessary to provide full functional mobility and quality care        Daily Treatment Log  Manual     MT, ROM 30'    20'   HEP        Exercise Log    Balance Board        Chair Squats BOSU-Walk        Foam Pad SLR,Hip/KneeFl,Step Ups        Foam Beam        P-Bar-GT-Forward, Backward,Side-Even & Dips        Monster Steps        Fitter-Slalom        T/G-Squats,PF     30x   W/P-PNF,IR,ER,PU,PS:Top,Mid,Bot        NuStep 10' L5    L5 10'   Hilary-BP,Lats,PD,Row        Ixjoezw-BU-Gf        NK Table        TM        UBC     10'   Bike     10'   Stepper        ME, PE 13'    15'           Modalities 8/20 8/14   MH&ES        US

## 2020-08-21 ENCOUNTER — APPOINTMENT (OUTPATIENT)
Dept: PHYSICAL THERAPY | Facility: CLINIC | Age: 25
End: 2020-08-21
Payer: COMMERCIAL

## 2020-08-25 ENCOUNTER — OFFICE VISIT (OUTPATIENT)
Dept: PHYSICAL THERAPY | Facility: CLINIC | Age: 25
End: 2020-08-25
Payer: COMMERCIAL

## 2020-08-25 DIAGNOSIS — M54.41 ACUTE BILATERAL LOW BACK PAIN WITH BILATERAL SCIATICA: ICD-10-CM

## 2020-08-25 DIAGNOSIS — M54.42 ACUTE BILATERAL LOW BACK PAIN WITH BILATERAL SCIATICA: ICD-10-CM

## 2020-08-25 DIAGNOSIS — Z48.89 AFTERCARE FOLLOWING SURGERY: Primary | ICD-10-CM

## 2020-08-25 PROCEDURE — 97140 MANUAL THERAPY 1/> REGIONS: CPT

## 2020-08-25 NOTE — PROGRESS NOTES
Today's date: 2020  Patient name: Chiki Sheth  : 1995  MRN: [id number]  Referring provider: Guevara Ewing MD  Dx:   Encounter Diagnosis     ICD-10-CM    1  Aftercare following surgery  Z48 89    2  Acute bilateral low back pain with bilateral sciatica  M54 42     M54 41      Subjective:  "I'm sore today  My back and my L knee are bothering me  Now my L knee is starting to dislocate  I'm going to see my dr later today "    Objective: See treatment log below  Juanjose Taylor continues to be advised to follow her home exercise program as tolerated  When Juanjose Taylor is feeling good she follows her rehab exercises in the gym and on other days she follows her home exercise program at home as tolerated  In the future we plan on having Meka stay at home and follow her home exercise program for four to six weeks and than assess for either more Rehab treatments or discharge from Rehab care  Assessment: Tolerated treatment well  Patient exhibited good technique with therapeutic exercises and would benefit from continued PT to decrease spasm/tightness to increase mobility  Meka's goals are to continue to improve with her rehab program and improve with functional mobility, speed, repetition and decreased c/o pain with her gym and home exercise program   Alisha Spaulding final goal for her rehab is to be discharged from Rehab care after obtaining her full functional rehab potential     Plan: Continue per plan of care  Progress treatment as tolerated  Precautions:  Low back pain and bilateral SIJ region pain with Radic on L LE    All treatments below will be provided with a focus on strengthening, flexibility, ROM, postural,   endurance and any possible swelling and pain which may be present without ignoring   neural issues involving balance, coordination and proprioception which is also important   and necessary to provide full functional mobility and quality care        Daily Treatment Log  Manual   MT, ROM 30' 30'      HEP        Exercise Log 8/20 8/25      Balance Board        Chair Squats        BOSU-Walk        Foam Pad SLR,Hip/KneeFl,Step Ups        Foam Beam        P-Bar-GT-Forward, Backward,Side-Even & Dips        Monster Steps        Fitter-Slalom        T/G-Squats,PF        W/P-PNF,IR,ER,PU,PS:Top,Mid,Bot        NuStep 10' L5       Hilary-BP,Lats,PD,Row        Ulqrmdy-IL-Vv        NK Table        TM        UBC        Bike        Stepper        ME, PE 15' 15'              Modalities 8/20 8/25      MH&ES        US

## 2020-08-27 ENCOUNTER — OFFICE VISIT (OUTPATIENT)
Dept: PHYSICAL THERAPY | Facility: CLINIC | Age: 25
End: 2020-08-27
Payer: COMMERCIAL

## 2020-08-27 DIAGNOSIS — Z48.89 AFTERCARE FOLLOWING SURGERY: Primary | ICD-10-CM

## 2020-08-27 DIAGNOSIS — M54.41 ACUTE BILATERAL LOW BACK PAIN WITH BILATERAL SCIATICA: ICD-10-CM

## 2020-08-27 DIAGNOSIS — M54.42 ACUTE BILATERAL LOW BACK PAIN WITH BILATERAL SCIATICA: ICD-10-CM

## 2020-08-27 PROCEDURE — 97140 MANUAL THERAPY 1/> REGIONS: CPT

## 2020-08-27 NOTE — PROGRESS NOTES
Today's date: 2020  Patient name: Deepali Anne  : 1995  MRN: [id number]  Referring provider: Mary Hylton MD  Dx:   Encounter Diagnosis     ICD-10-CM    1  Aftercare following surgery  Z48 89    2  Acute bilateral low back pain with bilateral sciatica  M54 42     M54 41      Subjective:  "My back is terrible today  I've been sore and then I had to help my grandparents move  Now, I'm really having pain down by my tailbone "    Objective: See treatment log below  Yoav Chao continues to be advised to follow her home exercise program as tolerated  When Yoav Chao is feeling good she follows her rehab exercises in the gym and on other days she follows her home exercise program at home as tolerated  In the future we plan on having Meka stay at home and follow her home exercise program for four to six weeks and than assess for either more Rehab treatments or discharge from Rehab care  Assessment: Tolerated treatment well  Patient exhibited good technique with therapeutic exercises and would benefit from continued PT to increase ROM/strength and endurance to improve mobility  Meka's goals are to continue to improve with her rehab program and improve with functional mobility, speed, repetition and decreased c/o pain with her gym and home exercise program   Olinda Davis final goal for her rehab is to be discharged from Rehab care after obtaining her full functional rehab potential     Plan: Continue per plan of care  Progress treatment as tolerated  Precautions:  Low back pain and bilateral SIJ region pain with Radic on L LE    All treatments below will be provided with a focus on strengthening, flexibility, ROM, postural,   endurance and any possible swelling and pain which may be present without ignoring   neural issues involving balance, coordination and proprioception which is also important   and necessary to provide full functional mobility and quality care        Daily Treatment Log  Manual  8/20 8/25 8/27     MT, ROM 30' 30' 45'     HEP        Exercise Log 8/20 8/25 8/27     Balance Board        Chair Squats        BOSU-Walk        Foam Pad SLR,Hip/KneeFl,Step Ups        Foam Beam        P-Bar-GT-Forward, Backward,Side-Even & Dips        Monster Steps        Fitter-Slalom        T/G-Squats,PF        W/P-PNF,IR,ER,PU,PS:Top,Mid,Bot        NuStep 10' L5       Hilary-BP,Lats,PD,Row        Osdtnvy-GN-Tl        NK Table        TM        UBC        Bike        Stepper        ME, PE 15' 15' 15'             Modalities 8/20 8/25 8/27     MH&ES        US

## 2020-08-28 ENCOUNTER — APPOINTMENT (OUTPATIENT)
Dept: RADIOLOGY | Facility: MEDICAL CENTER | Age: 25
End: 2020-08-28
Payer: COMMERCIAL

## 2020-08-28 ENCOUNTER — OFFICE VISIT (OUTPATIENT)
Dept: OBGYN CLINIC | Facility: CLINIC | Age: 25
End: 2020-08-28
Payer: COMMERCIAL

## 2020-08-28 VITALS — TEMPERATURE: 97 F | WEIGHT: 207.9 LBS | HEIGHT: 64 IN | BODY MASS INDEX: 35.49 KG/M2

## 2020-08-28 DIAGNOSIS — S83.005A DISLOCATED PATELLA, LEFT, INITIAL ENCOUNTER: Primary | ICD-10-CM

## 2020-08-28 DIAGNOSIS — S83.005A DISLOCATED PATELLA, LEFT, INITIAL ENCOUNTER: ICD-10-CM

## 2020-08-28 PROCEDURE — 73562 X-RAY EXAM OF KNEE 3: CPT

## 2020-08-28 PROCEDURE — 99213 OFFICE O/P EST LOW 20 MIN: CPT | Performed by: ORTHOPAEDIC SURGERY

## 2020-08-28 NOTE — PATIENT INSTRUCTIONS
Knee Exercises   WHAT YOU NEED TO KNOW:   What do I need to know about knee exercises? Knee exercises help strengthen the muscles around your knee  Strong muscles can help reduce pain and decrease your risk of future injury  Knee exercises also help you heal after an injury or surgery  · Start slow  These are beginning exercises  Ask your healthcare provider if you need to see a physical therapist for more advanced exercises  As you get stronger, you may be able to do more sets of each exercise or add weights  · Stop if you feel pain  It is normal to feel some discomfort at first  Regular exercise will help decrease your discomfort over time  · Do the exercises on both legs  Do this so both knees remain strong  · Warm up before you do knee exercises  Walk or ride a stationary bike for 5 or 10 minutes to warm your muscles  How do I perform knee stretches safely? Always stretch before you do strengthening exercises  Do these stretching exercises again after you do the strengthening exercises  Do these stretches 4 or 5 days a week, or as directed  · Standing calf stretch: Face a wall and place both palms flat on the wall, or hold the back of a chair for balance  Keep a slight bend in your knees  Take a big step backward with one leg  Keep your other leg directly under you  Keep both heels flat and press your hips forward  Hold the stretch for 30 seconds, and then relax for 30 seconds  Switch legs  Repeat 2 or 3 times on each leg  · Standing quadriceps stretch:  Stand and place one hand against a wall or hold the back of a chair for balance  With your weight on one leg, bend your other leg and grab your ankle  Bring your heel toward your buttocks  Hold the stretch for 30 to 60 seconds  Switch legs  Repeat 2 or 3 times on each leg  · Sitting hamstring stretch:  Sit with both legs straight in front of you  Do not point or flex your toes   Place your palms on the floor and slide your hands forward until you feel the stretch  Do not round your back  Hold the stretch for 30 seconds  Repeat 2 or 3 times  How do I perform knee strengthening exercises safely? Do these exercises 4 or 5 days a week, or as directed  · Standing half squats:  Stand with your feet shoulder-width apart  Lean your back against a wall or hold the back of a chair for balance, if needed  Slowly sit down about 10 inches, as if you are going to sit in a chair  Your body weight should be mostly over your heels  Hold the squat for 5 seconds, then rise to a standing position  Do 3 sets of 10 squats to strengthen your buttocks and thighs  · Standing hamstring curls: Face a wall and place both palms flat on the wall, or hold the back of a chair for balance  With your weight on one leg, lift your other foot as close to your buttocks as you can  Hold for 5 seconds and then lower your leg  Do 2 sets of 10 curls on each leg  This exercise strengthens the muscles in the back of your thigh  · Standing calf raises:  Face a wall and place both palms flat on the wall, or hold the back of a chair for balance  Stand up straight, and do not lean  Place all your weight on one leg by lifting the other foot off the floor  Raise the heel of the foot that is on the floor as high as you can and then lower it  Do 2 sets of 10 calf raises on each leg to strengthen your calf muscles  · Straight leg lifts:  Lie on your stomach with straight legs  Fold your arms in front of you and rest your head in your arms  Tighten your leg muscles and raise one leg as high as you can  Hold for 5 seconds, then lower your leg  Do 2 sets of 10 lifts on each leg to strengthen your buttocks  · Sitting leg lifts:  Sit in a chair  Slowly straighten and raise one leg  Squeeze your thigh muscles and hold for 5 seconds  Relax and return your foot to the floor  Do 2 sets of 10 lifts on each leg   This helps strengthen the muscles in the front of your thigh  When should I contact my healthcare provider? · You have new pain or your pain becomes worse  · You have questions or concerns about your condition or care  CARE AGREEMENT:   You have the right to help plan your care  Learn about your health condition and how it may be treated  Discuss treatment options with your caregivers to decide what care you want to receive  You always have the right to refuse treatment  The above information is an  only  It is not intended as medical advice for individual conditions or treatments  Talk to your doctor, nurse or pharmacist before following any medical regimen to see if it is safe and effective for you  © 2017 [employer] [employer] Information is for End User's use only and may not be sold, redistributed or otherwise used for commercial purposes  All illustrations and images included in CareNotes® are the copyrighted property of [employer]  or Jason Delong

## 2020-08-28 NOTE — PROGRESS NOTES
Chief Complaint  Left patella subluxation    History Of Presenting Illness  Veronica Starr 1995 presents with left patellar dislocation or subluxation  Patient had a previous right patella surgical stabilization  Over the weekend patient was getting out of bed when she felt helped left kneecap slip inwards  She says she manipulated back into position  Since then she has a sense of instability in the left knee with the kneecap tending to move inverted  Patient presents for evaluation of x-rays no history of trauma      Current Medications  Current Outpatient Medications   Medication Sig Dispense Refill    albuterol (PROVENTIL HFA,VENTOLIN HFA) 90 mcg/act inhaler Inhale 2 puffs every 6 (six) hours as needed for wheezing      citalopram (CeleXA) 20 mg tablet Take 40 mg by mouth daily       fluticasone (FLOVENT HFA) 110 MCG/ACT inhaler Inhale 2 puffs 2 (two) times a day Rinse mouth after use   ibuprofen (MOTRIN) 800 mg tablet Take 1 tablet (800 mg total) by mouth every 8 (eight) hours for 5 days 15 tablet 0    levothyroxine 100 mcg tablet Take 100 mcg by mouth daily      norgestimate-ethinyl estradiol (ORTHO-CYCLEN) 0 25-35 MG-MCG per tablet Take 1 tablet by mouth daily      QUEtiapine (SEROquel) 200 mg tablet Take 250 mg by mouth daily at bedtime        No current facility-administered medications for this visit          Current Problems    Active Problems:   Patient Active Problem List    Diagnosis Date Noted    Painful scar 11/06/2019    Closed dislocation of right patella 09/04/2019         Review of Systems:    General: negative for - chills, fatigue, fever,  weight gain or weight loss  Psychological: negative for - anxiety, behavioral disorder, concentration difficulties  Ophthalmic: negative for - blurry vision, decreased vision, double vision,      Past Medical History:   Past Medical History:   Diagnosis Date    Asthma     Bipolar 1 disorder ([facility] [address] )     Depression     Disease of thyroid gland     Nerve disorder     Pet allergy     Seasonal allergies        Past Surgical History:   Past Surgical History:   Procedure Laterality Date    BACK SURGERY      KNEE ARTHROSCOPY Right     KNEE ARTHROSCOPY W/ ACL RECONSTRUCTION      ME KNEE SCOPE,FULL SYNOVECT Right 9/16/2019    Procedure: ARTHROSCOPY KNEE  arthroscopy of right knee 1st, mini open right MPFL reconstruction with allograft;  Surgeon: Aniket Castillo MD;  Location: [facility];  Service: Orthopedics    REVISION OF SCAR Right 11/18/2019    Procedure: REVISION SCAR EXTREMITY;  Surgeon: Aniket Castillo MD;  Location: [facility];  Service: Orthopedics    WISDOM TOOTH EXTRACTION         Family History:  Family history reviewed and non-contributory  Family History   Problem Relation Age of Onset    Pneumonia Mother     Multiple sclerosis Father        Social History:  Social History     Socioeconomic History    Marital status: Single     Spouse name: None    Number of children: None    Years of education: None    Highest education level: None   Occupational History    None   Social Needs    Financial resource strain: None    Food insecurity     Worry: None     Inability: None    Transportation needs     Medical: None     Non-medical: None   Tobacco Use    Smoking status: Current Every Day Smoker     Types: E-Cigarettes    Smokeless tobacco: Current User    Tobacco comment: vapes   Substance and Sexual Activity    Alcohol use: Not Currently    Drug use: Yes     Types: Marijuana     Comment: has not used for 3 m,[facility]    Sexual activity: None   Lifestyle    Physical activity     Days per week: None     Minutes per session: None    Stress: None   Relationships    Social connections     Talks on phone: None     Gets together: None     Attends Hinduism service: None     Active member of club or organization: None     Attends meetings of clubs or organizations: None     Relationship status: None    Intimate partner violence Fear of current or ex partner: None     Emotionally abused: None     Physically abused: None     Forced sexual activity: None   Other Topics Concern    None   Social History Narrative    None       Allergies:   No Known Allergies        Physical ExaminationTemp (!) 97 °F (36 1 °C)   Ht 5' 4" (1 626 m)   Wt 94 3 kg (207 lb 14 4 oz)   BMI 35 69 kg/m²   Gen: Alert and oriented to person, place, time  HEENT: EOMI, eyes clear, moist mucus membranes, hearing intact      Orthopedic Exam  Left knee patella tracks well with patellofemoral crepitus  Tenderness medial and lateral retinaculum  Radiographs normal          Impression  Left patellar instability        Plan    Discussed treatment with the patient  Do strengthening and physical therapy with a patellar sleeve brace  Follow-up in 3 months or sooner if there is a problem  If symptoms continue patient will need an MRI or a CT scan    Axel Flores MD        Portions of the record may have been created with voice recognition software  Occasional wrong word or "sound a like" substitutions may have occurred due to the inherent limitations of voice recognition software  Read the chart carefully and recognize, using context, where substitutions have occurred

## 2020-09-01 ENCOUNTER — OFFICE VISIT (OUTPATIENT)
Dept: PHYSICAL THERAPY | Facility: CLINIC | Age: 25
End: 2020-09-01
Payer: COMMERCIAL

## 2020-09-01 DIAGNOSIS — Z48.89 AFTERCARE FOLLOWING SURGERY: Primary | ICD-10-CM

## 2020-09-01 DIAGNOSIS — M54.42 ACUTE BILATERAL LOW BACK PAIN WITH BILATERAL SCIATICA: ICD-10-CM

## 2020-09-01 DIAGNOSIS — M54.41 ACUTE BILATERAL LOW BACK PAIN WITH BILATERAL SCIATICA: ICD-10-CM

## 2020-09-01 DIAGNOSIS — M25.562 ACUTE PAIN OF LEFT KNEE: ICD-10-CM

## 2020-09-01 PROCEDURE — 97164 PT RE-EVAL EST PLAN CARE: CPT | Performed by: PHYSICAL THERAPIST

## 2020-09-01 PROCEDURE — G0283 ELEC STIM OTHER THAN WOUND: HCPCS

## 2020-09-01 PROCEDURE — 97140 MANUAL THERAPY 1/> REGIONS: CPT

## 2020-09-01 PROCEDURE — 97014 ELECTRIC STIMULATION THERAPY: CPT

## 2020-09-01 NOTE — PROGRESS NOTES
Today's date: 2020  Patient name: Álvaro Potts  : 1995  MRN: [id number]  Referring provider: Malika Lechuga MD  Dx:   Encounter Diagnosis     ICD-10-CM    1  Aftercare following surgery  Z48 89    2  Acute bilateral low back pain with bilateral sciatica  M54 42     M54 41      Subjective:  "I'm still real sore from helping my grandparents move " Patient has a new PT script for her L knee  Objective: See treatment log below  Talia Noble continues to be advised to follow her home exercise program as tolerated  When Talia Noble is feeling good she follows her rehab exercises in the gym and on other days she follows her home exercise program at home as tolerated  In the future we plan on having Meka stay at home and follow her home exercise program for four to six weeks and than assess for either more Rehab treatments or discharge from Rehab care  Assessment: Tolerated treatment well  Patient exhibited good technique with therapeutic exercises and would benefit from continued PT to increase ROM/strength and endurance to improve mobility  Meka's goals are to continue to improve with her rehab program and improve with functional mobility, speed, repetition and decreased c/o pain with her gym and home exercise program   Doyle Fall final goal for her rehab is to be discharged from Rehab care after obtaining her full functional rehab potential     Plan: Continue per plan of care  Progress treatment as tolerated  Precautions:  Low back pain and bilateral SIJ region pain with Radic on L LE    All treatments below will be provided with a focus on strengthening, flexibility, ROM, postural,   endurance and any possible swelling and pain which may be present without ignoring   neural issues involving balance, coordination and proprioception which is also important   and necessary to provide full functional mobility and quality care        Daily Treatment Log  Manual      MT, ROM 30' 30' 39' 35'    HEP        Exercise Log 8/20 8/25 8/27 9/1    Balance Board        Chair Squats        BOSU-Walk        Foam Pad SLR,Hip/KneeFl,Step Ups        Foam Beam        P-Bar-GT-Forward, Backward,Side-Even & Dips        Monster Steps        Fitter-Slalom        T/G-Squats,PF        W/P-PNF,IR,ER,PU,PS:Top,Mid,Bot        NuStep 10' L5       Hilary-BP,Lats,PD,Row        Dvacuqk-KU-Yq        NK Table        TM        UBC        Bike        Stepper        ME, PE 15' 15' 15' 15'            Modalities 8/20 8/25 8/27 9/1    MH&ES    20'    US

## 2020-09-01 NOTE — LETTER
2020  PT Reevaluation Padmini Michelle MD  [address] [facility] [address]    Patient: Chiki Sheth   YOB: 1995   Date of Visit: 2020     Encounter Diagnosis     ICD-10-CM    1  Aftercare following surgery  Z48 89 PT plan of care cert/re-cert   2  Acute bilateral low back pain with bilateral sciatica  M54 42 PT plan of care cert/re-cert    N12 49    3  Acute pain of left knee  M25 562      Dear Dr Lianne Day:  Thank you for your recent referral of Chiki Sheth  Please review the attached evaluation summary from [facility] recent visit  Please verify that you agree with the plan of care by signing the attached order  If you have any questions or concerns, please do not hesitate to call  I sincerely appreciate the opportunity to share in the care of one of your patients and hope to have another opportunity to work with you in the near future  Sincerely,    Romayne Bay, PT    Referring Provider:      I certify that I have read the below Plan of Care and certify the need for these services furnished under this plan of treatment while under my care  Jason Peña MD  [address] [place] [address] In Basket    Please SIGN ABOVE and return THIS PAGE ONLY to Fax # [phone]        Today's date: 2020  Patient name: Chiki Sheth  : 1995  MRN: [id number]  Referring provider: Guevara Ewing MD  Dx:   Encounter Diagnosis     ICD-10-CM    1  Aftercare following surgery  Z48 89    2  Acute bilateral low back pain with bilateral sciatica  M54 42     M54 41      Subjective:  "I'm still real sore from helping my grandparents move " Patient has a new PT script for her L knee  Objective: See treatment log below  Juanjose Taylor continues to be advised to follow her home exercise program as tolerated    When Juanjose Taylor is feeling good she follows her rehab exercises in the gym and on other days she follows her home exercise program at home as tolerated  In the future we plan on having Meka stay at home and follow her home exercise program for four to six weeks and than assess for either more Rehab treatments or discharge from Rehab care  Assessment: Tolerated treatment well  Patient exhibited good technique with therapeutic exercises and would benefit from continued PT to increase ROM/strength and endurance to improve mobility  Meka's goals are to continue to improve with her rehab program and improve with functional mobility, speed, repetition and decreased c/o pain with her gym and home exercise program   Harris Gomer final goal for her rehab is to be discharged from Rehab care after obtaining her full functional rehab potential     Plan: Continue per plan of care  Progress treatment as tolerated  Precautions:  Low back pain and bilateral SIJ region pain with Radic on L LE    All treatments below will be provided with a focus on strengthening, flexibility, ROM, postural,   endurance and any possible swelling and pain which may be present without ignoring   neural issues involving balance, coordination and proprioception which is also important   and necessary to provide full functional mobility and quality care  Daily Treatment Log  Manual  8/20 8/25 8/27 9/1    MT, ROM 30' 30' 45' 35'    HEP        Exercise Log 8/20 8/25 8/27 9/1    Balance Board        Chair Squats        BOSU-Walk        Foam Pad SLR,Hip/KneeFl,Step Ups        Foam Beam        P-Bar-GT-Forward, Backward,Side-Even & Dips        Monster Steps        Fitter-Slalom        T/G-Squats,PF        W/P-PNF,IR,ER,PU,PS:Top,Mid,Bot        NuStep 10' L5       Hilary-BP,Lats,PD,Row        Kiawtyd-GC-Ky        NK Table        TM        UBC        Bike        Stepper        ME, PE 15' 15' 15' 15'            Modalities 8/20 8/25 8/27 9/1    MH&ES    20'    US            Attestation signed by Aditya Ferreira PT at 9/1/2020  2:31 PM:  I supervised the visit    We discussed the case to ensure appropriate continuation and progression of care and I reviewed the documentation  PT Re-Evaluation   Today's date: 2020    Patient name: Davin Dennis  : 1995  MRN: [id number]  Referring provider: Ondina Fields MD  Dx:   Encounter Diagnosis     ICD-10-CM    1  Aftercare following surgery  Z48 89 PT plan of care cert/re-cert   2  Acute bilateral low back pain with bilateral sciatica  M54 42 PT plan of care cert/re-cert    J35 11    3  Acute pain of left knee  M25 562      Assessment  Assessment details: Patient has increased pain with her left knee region and low back region  She had been standing and walking more and suspects this has aggravated her left knee and back regions  Impairments: abnormal gait, abnormal or restricted ROM, abnormal movement, activity intolerance, impaired balance, impaired physical strength, lacks appropriate home exercise program, pain with function, safety issue and weight-bearing intolerance  Understanding of Dx/Px/POC: excellent   Prognosis: good    Goals  STG 2-4 weeks:    Increase B LE strength 2-5 lbs  Decrease pain by 1-2 levels on 1-10 pain scale  Increase standing/walking tolerance to >30 minutes  Patient independent with HEP  LTG 6-8 weeks:   Increase B LE strength 10-20 lbs  Decrease pain to 0-2/10 with activity  Increase single leg stance >30 seconds  Increase standing/walking tolerance to >90 minutes  Increase range of motion to normal   Improve gait pattern, coordination and balance to normal   D/C with HEP  STG  2-4 weeks:   Increase lumbar spine AROM by 2-4 degrees  Increase lower extremity strength by 2-4 lbs in all weak areas  Improve sitting posture and body mechanics  Increase standing/ADL tolerance minutes  Decrease pain by 25-50% with standing, walking, and stairs  Initiate HEP  LTG  6-8 weeks:   Demonstrate normal lumbar rotation  Decrease pain to 1-2/10 with activity     Increase lower extremity strength by 10-20 lbs in all weak areas  Improve spinal stability  Improve gait pattern and balance  Decrease limitations with standing, walking and ADL's  DC with HEP  Plan  Plan details: All planned modality interventions and planned therapy interventions are provided PRN  Patient would benefit from: PT eval and skilled physical therapy  Planned modality interventions: TENS, ultrasound and unattended electrical stimulation  Planned therapy interventions: joint mobilization, manual therapy, balance, balance/weight bearing training, neuromuscular re-education, patient education, postural training, self care, coordination, strengthening, stretching, therapeutic activities, therapeutic exercise, therapeutic training, transfer training, home exercise program, graded exercise, gait training and flexibility  Frequency: 3x week  Duration in weeks: 12  Treatment plan discussed with: patient      Subjective Evaluation    Pain  Quality: discomfort, knife-like, pulling, squeezing, sharp, tight and throbbing  Relieving factors: relaxation, rest, support and change in position  Aggravating factors: lifting, running, stair climbing, walking and standing  Progression: worsening    Treatments  Current treatment: physical therapy  Patient Goals  Patient goals for therapy: decreased pain, improved balance, increased motion, return to work, return to [employer] activities, independence with ADLs/IADLs and increased strength      Date of onset:  9/16/2019    Date of Surgery:  9/16/2020    History of Present Episode: 5/4/2020  Lex Gonzalez states she received surgery to her right knee  Her low back flared up after her knee surgery  She had stopped therapy because of COVID19 Issues and her low back has really flared up and she is now returning  9/1/2020  Lex Gonzalez states her left knee has flared up now an she needs help / treatment in this area  No history of trauma she just flared up    She has been standing more and working at home and suspect this may have flared up her knee  Past Medical History:    5/4/2020  Ty Hernandez reports Right knee surgery and low back surgery  Asthma  Previous Level of Functional Ability:  5/4/2020  Ty Hernandez states her knee had been settling down but her back was painful  Limited many activities  Inspection / Palpation:  Lumbar:  5/4/2020  Mesomorphic / Endomorphic body type  No signs of infection  No signs of wounds  No signs of drainage  No signs of ecchymotic regions  No signs of erythremic regions  Moderate to severe signs of muscle spasm  Moderate to severe signs of muscle guarding  Moderate to severe signs of tenderness reported to palpation  No signs of swelling  Positive signs of a surgery site  Current conditions appears consistent with recent acute episode  Chief Complaints:  5/4/2020  Ty Hernandez reports moderate difficulty with standing  Ty Hernandez reports moderate difficulty with walking  Ty Hernandez reports moderate difficulty with movement / use of her lumbar region  Ty Hernandez reports moderate to severe difficulty with use of stairs  Ty Hernandez reports severe difficulty with running  Ty Hernandez reports severe difficulty with jumping  Ty Hernandez reports moderate to severe difficulty with use of inclines  Ty Hernandez reports moderate to severe difficulty with sleeping  Ty Hernandez reports moderate difficulty with her strength and endurance  Ty Hernandez reports moderate limitations with her range of motion  Ty Hernandez reports moderate to severe difficulty lying on her lumbar region  Ty Hernandez reports moderate to severe difficulty twisting / turning her lumbar region      LUMBAR PAIN     Resting Palpation Bending Extending Walking Lifting   5/4/2020 4-8 4-10 4-9 5-9 5-10 5-10   6/4/2020 3-5 3-8 3-8 4-8 4-8 4-8   7/9/2020 3-5 3-8 3-8 4-8 4-8 4-8   8/12/2020 2-5 2-7 2-7 3-7 3-7 3-7     LUMBAR PAIN     Pulling Pushing Sleeping Twisting Standing   5/4/2020 4-8 4-10 4-10 4-8 5-10   6/4/2020 3-7 3-8 3-8 3-7 4-8   7/9/2020 3-7 3-8 3-8 3-7 4-8   8/12/2020 2-6 2-7 2-7 2-6 3-7     KNEE PAIN Resting Moving Palpation Standing Walking   9/1/2020 Rt 0 0 0 0 0   9/1/2020 Lt 0-2 2-5 2-7 2-6 2-6     KNEE PAIN Running Stairs Sleeping Twisting Jumping   9/1/2020 Rt 0 0 0 0 0   9/1/2020 Lt NA 2-7 0-3 4-7 NA     LUMBAR AROM Flexion Extension Rotation Right   5/4/2020 35° 5° 34°   6/4/2020 39° 7° 37°   7/9/2020 45° 10° 39°   8/12/2020 51° 10° 41°     LUMBAR AROM Rotation Left Side Bending Right Side Bending Left   5/4/2020 35° 28° 30°   6/4/2020 38° 31° 33°   7/9/2020 41° 35° 36°   8/12/2020 43° 38° 39°     KNEE AROM Flexion Extension SLR   9/1/2020 Rt 145° 0° 95°   9/1/2020 Lt 135° 2° 87°     LUMBAR MMT / PAIN Flexion Extension Rotation Right   5/4/2020 4/10  11 lbs 5/10  12 lbs 5/10  8 lbs   6/4/2020 3/10  14 lbs 4/10  15 lbs 4/10  11 lbs   7/9/2020 3/10  15 lbs 4/10  17 lbs 4/10  13 lbs   8/12/2020 2/10  18 lbs 3/10  20 lbs 3/10  15 lbs     LUMBAR MMT / PAIN Rotation Left Side Bend Right Side Bend Left   5/4/2020 5/10  7 lbs 4/10  11 lbs 4/10  12 lbs   6/4/2020 4/10  10 lbs 3/10  14 lbs 3/10  15 lbs   7/9/2020 4/10  12 lbs 3/10  15 lbs 3/10  17 lbs   8/12/2020 3/10  15 lbs 2/10  17 lbs 2/10  20 lbs       KNEE MMT / PAIN Flexion Extension Varus Stress Valgus Stress   9/1/2020 Rt 0/10  38 lbs 0/10  37 lbs 0/10  33 lbs 0/10  34 lbs   9/1/2020 Lt 4/10  24 lbs 4/10  21 lbs 4/10  19 lbs 4/10  18 lbs     LUMBAR SCREEN Referred Pain Sacroiliac Joint test Squish Test Straight Leg  Raise   5/4/2020 Rt Negative Negative Negative Negative   5/4/2020 Lt POSITIVE POSITIVE Negative POSITIVE     LUMBAR SCREEN Valsalva Gapping Bowstring sign Hip Bursitis   5/4/2020 Rt Negative Negative Negative Negative   5/4/2020 Lt Negative Negative Negative Negative     Precautions:  Low back pain and bilateral SIJ region pain with Radic on L LE

## 2020-09-02 NOTE — PROGRESS NOTES
PT Re-Evaluation   Today's date: 2020    Patient name: Deepali Anne  : 1995  MRN: [id number]  Referring provider: Mary Hylton MD  Dx:   Encounter Diagnosis     ICD-10-CM    1  Aftercare following surgery  Z48 89 PT plan of care cert/re-cert   2  Acute bilateral low back pain with bilateral sciatica  M54 42 PT plan of care cert/re-cert    G06 09    3  Acute pain of left knee  M25 562      Assessment  Assessment details: Patient has increased pain with her left knee region and low back region  She had been standing and walking more and suspects this has aggravated her left knee and back regions  Impairments: abnormal gait, abnormal or restricted ROM, abnormal movement, activity intolerance, impaired balance, impaired physical strength, lacks appropriate home exercise program, pain with function, safety issue and weight-bearing intolerance  Understanding of Dx/Px/POC: excellent   Prognosis: good    Goals  STG 2-4 weeks:    Increase B LE strength 2-5 lbs  Decrease pain by 1-2 levels on 1-10 pain scale  Increase standing/walking tolerance to >30 minutes  Patient independent with HEP  LTG 6-8 weeks:   Increase B LE strength 10-20 lbs  Decrease pain to 0-2/10 with activity  Increase single leg stance >30 seconds  Increase standing/walking tolerance to >90 minutes  Increase range of motion to normal   Improve gait pattern, coordination and balance to normal   D/C with HEP  STG  2-4 weeks:   Increase lumbar spine AROM by 2-4 degrees  Increase lower extremity strength by 2-4 lbs in all weak areas  Improve sitting posture and body mechanics  Increase standing/ADL tolerance minutes  Decrease pain by 25-50% with standing, walking, and stairs  Initiate HEP  LTG  6-8 weeks:   Demonstrate normal lumbar rotation  Decrease pain to 1-2/10 with activity  Increase lower extremity strength by 10-20 lbs in all weak areas  Improve spinal stability  Improve gait pattern and balance  Decrease limitations with standing, walking and ADL's  DC with HEP  Plan  Plan details: All planned modality interventions and planned therapy interventions are provided PRN  Patient would benefit from: PT eval and skilled physical therapy  Planned modality interventions: TENS, ultrasound and unattended electrical stimulation  Planned therapy interventions: joint mobilization, manual therapy, balance, balance/weight bearing training, neuromuscular re-education, patient education, postural training, self care, coordination, strengthening, stretching, therapeutic activities, therapeutic exercise, therapeutic training, transfer training, home exercise program, graded exercise, gait training and flexibility  Frequency: 3x week  Duration in weeks: 12  Treatment plan discussed with: patient      Subjective Evaluation    Pain  Quality: discomfort, knife-like, pulling, squeezing, sharp, tight and throbbing  Relieving factors: relaxation, rest, support and change in position  Aggravating factors: lifting, running, stair climbing, walking and standing  Progression: worsening    Treatments  Current treatment: physical therapy  Patient Goals  Patient goals for therapy: decreased pain, improved balance, increased motion, return to work, return to [employer] activities, independence with ADLs/IADLs and increased strength      Date of onset:  9/16/2019    Date of Surgery:  9/16/2020    History of Present Episode: 5/4/2020  Paula Batres states she received surgery to her right knee  Her low back flared up after her knee surgery  She had stopped therapy because of COVID19 Issues and her low back has really flared up and she is now returning  9/1/2020  Paula Batres states her left knee has flared up now an she needs help / treatment in this area  No history of trauma she just flared up  She has been standing more and working at home and suspect this may have flared up her knee      Past Medical History:    5/4/2020  Paula Batres reports Right knee surgery and low back surgery  Asthma  Previous Level of Functional Ability:  5/4/2020  Nell Gosselin states her knee had been settling down but her back was painful  Limited many activities  Inspection / Palpation:  Lumbar:  5/4/2020  Mesomorphic / Endomorphic body type  No signs of infection  No signs of wounds  No signs of drainage  No signs of ecchymotic regions  No signs of erythremic regions  Moderate to severe signs of muscle spasm  Moderate to severe signs of muscle guarding  Moderate to severe signs of tenderness reported to palpation  No signs of swelling  Positive signs of a surgery site  Current conditions appears consistent with recent acute episode  Chief Complaints:  5/4/2020  Nell Gosselin reports moderate difficulty with standing  Suzannall Gosselin reports moderate difficulty with walking  Suzanna Gosselin reports moderate difficulty with movement / use of her lumbar region  Suzannall Gosselin reports moderate to severe difficulty with use of stairs  Suzannall Gosselin reports severe difficulty with running  Suzannall Gosselin reports severe difficulty with jumping  Suzannall Gosselin reports moderate to severe difficulty with use of inclines  Suzannall Gosselin reports moderate to severe difficulty with sleeping  Nell Gosselin reports moderate difficulty with her strength and endurance  Suzannameng ShresthaGosselin reports moderate limitations with her range of motion  Suzanna Gosselin reports moderate to severe difficulty lying on her lumbar region  Suzanna Gosselin reports moderate to severe difficulty twisting / turning her lumbar region      LUMBAR PAIN     Resting Palpation Bending Extending Walking Lifting   5/4/2020 4-8 4-10 4-9 5-9 5-10 5-10   6/4/2020 3-5 3-8 3-8 4-8 4-8 4-8   7/9/2020 3-5 3-8 3-8 4-8 4-8 4-8   8/12/2020 2-5 2-7 2-7 3-7 3-7 3-7     LUMBAR PAIN     Pulling Pushing Sleeping Twisting Standing   5/4/2020 4-8 4-10 4-10 4-8 5-10   6/4/2020 3-7 3-8 3-8 3-7 4-8   7/9/2020 3-7 3-8 3-8 3-7 4-8   8/12/2020 2-6 2-7 2-7 2-6 3-7     KNEE PAIN Resting Moving Palpation Standing Walking 9/1/2020 Rt 0 0 0 0 0   9/1/2020 Lt 0-2 2-5 2-7 2-6 2-6     KNEE PAIN Running Stairs Sleeping Twisting Jumping   9/1/2020 Rt 0 0 0 0 0   9/1/2020 Lt NA 2-7 0-3 4-7 NA     LUMBAR AROM Flexion Extension Rotation Right   5/4/2020 35° 5° 34°   6/4/2020 39° 7° 37°   7/9/2020 45° 10° 39°   8/12/2020 51° 10° 41°     LUMBAR AROM Rotation Left Side Bending Right Side Bending Left   5/4/2020 35° 28° 30°   6/4/2020 38° 31° 33°   7/9/2020 41° 35° 36°   8/12/2020 43° 38° 39°     KNEE AROM Flexion Extension SLR   9/1/2020 Rt 145° 0° 95°   9/1/2020 Lt 135° 2° 87°     LUMBAR MMT / PAIN Flexion Extension Rotation Right   5/4/2020 4/10  11 lbs 5/10  12 lbs 5/10  8 lbs   6/4/2020 3/10  14 lbs 4/10  15 lbs 4/10  11 lbs   7/9/2020 3/10  15 lbs 4/10  17 lbs 4/10  13 lbs   8/12/2020 2/10  18 lbs 3/10  20 lbs 3/10  15 lbs     LUMBAR MMT / PAIN Rotation Left Side Bend Right Side Bend Left   5/4/2020 5/10  7 lbs 4/10  11 lbs 4/10  12 lbs   6/4/2020 4/10  10 lbs 3/10  14 lbs 3/10  15 lbs   7/9/2020 4/10  12 lbs 3/10  15 lbs 3/10  17 lbs   8/12/2020 3/10  15 lbs 2/10  17 lbs 2/10  20 lbs       KNEE MMT / PAIN Flexion Extension Varus Stress Valgus Stress   9/1/2020 Rt 0/10  38 lbs 0/10  37 lbs 0/10  33 lbs 0/10  34 lbs   9/1/2020 Lt 4/10  24 lbs 4/10  21 lbs 4/10  19 lbs 4/10  18 lbs     LUMBAR SCREEN Referred Pain Sacroiliac Joint test Squish Test Straight Leg  Raise   5/4/2020 Rt Negative Negative Negative Negative   5/4/2020 Lt POSITIVE POSITIVE Negative POSITIVE     LUMBAR SCREEN Valsalva Gapping Bowstring sign Hip Bursitis   5/4/2020 Rt Negative Negative Negative Negative   5/4/2020 Lt Negative Negative Negative Negative     Precautions:  Low back pain and bilateral SIJ region pain with Radic on L LE

## 2020-09-09 ENCOUNTER — OFFICE VISIT (OUTPATIENT)
Dept: PHYSICAL THERAPY | Facility: CLINIC | Age: 25
End: 2020-09-09
Payer: COMMERCIAL

## 2020-09-09 DIAGNOSIS — Z48.89 AFTERCARE FOLLOWING SURGERY: Primary | ICD-10-CM

## 2020-09-09 DIAGNOSIS — M54.41 ACUTE BILATERAL LOW BACK PAIN WITH BILATERAL SCIATICA: ICD-10-CM

## 2020-09-09 DIAGNOSIS — M25.562 ACUTE PAIN OF LEFT KNEE: ICD-10-CM

## 2020-09-09 DIAGNOSIS — M54.42 ACUTE BILATERAL LOW BACK PAIN WITH BILATERAL SCIATICA: ICD-10-CM

## 2020-09-09 PROCEDURE — 97140 MANUAL THERAPY 1/> REGIONS: CPT

## 2020-09-09 NOTE — PROGRESS NOTES
Today's date: 2020  Patient name: Dia Valderrama  : 1995  MRN: [id number]  Referring provider: Mayela Johnson MD  Dx:   Encounter Diagnosis     ICD-10-CM    1  Aftercare following surgery  Z48 89    2  Acute bilateral low back pain with bilateral sciatica  M54 42     M54 41    3  Acute pain of left knee  M25 562      Subjective:  No new c/o pain today  Objective: See treatment log below  David Allen continues to be advised to follow her home exercise program as tolerated  When David Allen is feeling good she follows her rehab exercises in the gym and on other days she follows her home exercise program at home as tolerated  In the future we plan on having Meka stay at home and follow her home exercise program for four to six weeks and than assess for either more Rehab treatments or discharge from Rehab care  Assessment: Tolerated treatment well  Patient exhibited good technique with therapeutic exercises and would benefit from continued PT to increase ROM/strength and endurance to improve mobility  Meka's goals are to continue to improve with her rehab program and improve with functional mobility, speed, repetition and decreased c/o pain with her gym and home exercise program   Delvis Fischer final goal for her rehab is to be discharged from Rehab care after obtaining her full functional rehab potential     Plan: Continue per plan of care  Progress treatment as tolerated  Precautions:  Low back pain and bilateral SIJ region pain with Radic on L LE    All treatments below will be provided with a focus on strengthening, flexibility, ROM, postural,   endurance and any possible swelling and pain which may be present without ignoring   neural issues involving balance, coordination and proprioception which is also important   and necessary to provide full functional mobility and quality care        Daily Treatment Log  Manual  9/9    9/3   MT, ROM 20'       HEP        Exercise Log 9/9    9/3   Balance Board        Chair Squats        BOSU-Walk        Foam Pad SLR,Hip/KneeFl,Step Ups        Foam Beam        P-Bar-GT-Forward, Backward,Side-Even & Dips        Monster Steps        Fitter-Slalom        T/G-Squats,PF        W/P-PNF,IR,ER,PU,PS:Top,Mid,Bot        NuStep        Hilary-BP,Lats,PD,Row        [id number]        NK Table        TM        UBC        Bike        Stepper        ME, PE 15'    15'           Modalities 9/9    9/3   MH&ES        US

## 2020-09-10 ENCOUNTER — OFFICE VISIT (OUTPATIENT)
Dept: PHYSICAL THERAPY | Facility: CLINIC | Age: 25
End: 2020-09-10
Payer: COMMERCIAL

## 2020-09-10 DIAGNOSIS — M54.42 ACUTE BILATERAL LOW BACK PAIN WITH BILATERAL SCIATICA: ICD-10-CM

## 2020-09-10 DIAGNOSIS — Z48.89 AFTERCARE FOLLOWING SURGERY: Primary | ICD-10-CM

## 2020-09-10 DIAGNOSIS — M54.41 ACUTE BILATERAL LOW BACK PAIN WITH BILATERAL SCIATICA: ICD-10-CM

## 2020-09-10 DIAGNOSIS — M25.562 ACUTE PAIN OF LEFT KNEE: ICD-10-CM

## 2020-09-10 PROCEDURE — 97112 NEUROMUSCULAR REEDUCATION: CPT

## 2020-09-10 PROCEDURE — 97140 MANUAL THERAPY 1/> REGIONS: CPT

## 2020-09-10 NOTE — PROGRESS NOTES
Today's date: 9/10/2020  Patient name: Ronaldo Huerta  : 1995  MRN: [id number]  Referring provider: Lou Montiel MD  Dx:   Encounter Diagnosis     ICD-10-CM    1  Aftercare following surgery  Z48 89    2  Acute bilateral low back pain with bilateral sciatica  M54 42     M54 41    3  Acute pain of left knee  M25 562      Subjective:  No new c/o pain today  Objective: See treatment log below  Rock Nelson continues to be advised to follow her home exercise program as tolerated  When  Leslie is feeling good she follows her rehab exercises in the gym and on other days she follows her home exercise program at home as tolerated  In the future we plan on having Meka stay at home and follow her home exercise program for four to six weeks and than assess for either more Rehab treatments or discharge from Rehab care  Assessment: Tolerated treatment well  Patient exhibited good technique with therapeutic exercises and would benefit from continued PT to increase ROM/strength and endurance to improve mobility  Meka's goals are to continue to improve with her rehab program and improve with functional mobility, speed, repetition and decreased c/o pain with her gym and home exercise program   Aleisha Cadet final goal for her rehab is to be discharged from Rehab care after obtaining her full functional rehab potential     Plan: Continue per plan of care  Progress treatment as tolerated  Precautions:  Low back pain and bilateral SIJ region pain with Radic on L LE    All treatments below will be provided with a focus on strengthening, flexibility, ROM, postural,   endurance and any possible swelling and pain which may be present without ignoring   neural issues involving balance, coordination and proprioception which is also important   and necessary to provide full functional mobility and quality care        Daily Treatment Log  Manual  9/9 9/10      MT, ROM 20' 30'      HEP        Exercise Log 9/9 9/10 Balance Board        Chair Squats        BOSU-Walk        Foam Pad SLR,Hip/KneeFl,Step Ups        Foam Beam        P-Bar-GT-Forward, Backward,Side-Even & Dips        Monster Steps        Fitter-Slalom        T/G-Squats,PF        W/P-PNF,IR,ER,PU,PS:Top,Mid,Bot        NuStep  10' L5      Hilary-BP,Lats,PD,Row        Dikblot-QQ-Ap        NK Table        TM        UBC        Bike        Stepper        ME, PE 15' 15'              Modalities 9/9 9/10      MH&ES        US

## 2020-09-15 ENCOUNTER — OFFICE VISIT (OUTPATIENT)
Dept: PHYSICAL THERAPY | Facility: CLINIC | Age: 25
End: 2020-09-15
Payer: COMMERCIAL

## 2020-09-15 DIAGNOSIS — M25.562 ACUTE PAIN OF LEFT KNEE: ICD-10-CM

## 2020-09-15 DIAGNOSIS — Z48.89 AFTERCARE FOLLOWING SURGERY: Primary | ICD-10-CM

## 2020-09-15 DIAGNOSIS — M54.41 ACUTE BILATERAL LOW BACK PAIN WITH BILATERAL SCIATICA: ICD-10-CM

## 2020-09-15 DIAGNOSIS — M54.42 ACUTE BILATERAL LOW BACK PAIN WITH BILATERAL SCIATICA: ICD-10-CM

## 2020-09-15 PROCEDURE — 97140 MANUAL THERAPY 1/> REGIONS: CPT

## 2020-09-15 PROCEDURE — 97112 NEUROMUSCULAR REEDUCATION: CPT

## 2020-09-15 NOTE — PROGRESS NOTES
Today's date: 9/15/2020  Patient name: Killian Carter  : 1995  MRN: [id number]  Referring provider: Ro Woods MD  Dx:   Encounter Diagnosis     ICD-10-CM    1  Aftercare following surgery  Z48 89    2  Acute bilateral low back pain with bilateral sciatica  M54 42     M54 41    3  Acute pain of left knee  M25 562      Subjective:  No new c/o pain today  Objective: See treatment log below  Shoaib Chapin continues to be advised to follow her home exercise program as tolerated  When Shoaib Chapin is feeling good she follows her rehab exercises in the gym and on other days she follows her home exercise program at home as tolerated  In the future we plan on having Meka stay at home and follow her home exercise program for four to six weeks and than assess for either more Rehab treatments or discharge from Rehab care  Assessment: Tolerated treatment well  Patient exhibited good technique with therapeutic exercises and would benefit from continued PT Meka's goals are to continue to improve with her rehab program and improve with functional mobility, speed, repetition and decreased c/o pain with her gym and home exercise program   Sukhjinder Chowdhury final goal for her rehab is to be discharged from [address] [place] [address] after obtaining her full functional rehab potential     Plan: Continue per plan of care  Progress treatment as tolerated  Precautions:  Low back pain and bilateral SIJ region pain with Radic on L LE    All treatments below will be provided with a focus on strengthening, flexibility, ROM, postural,   endurance and any possible swelling and pain which may be present without ignoring   neural issues involving balance, coordination and proprioception which is also important   and necessary to provide full functional mobility and quality care        Daily Treatment Log  Manual  9/9 9/10 9/15     MT, ROM 20' 30' 20'     HEP        Exercise Log 9/9 9/10 9/15     Balance Board        Chair Squats        BOSU-Walk Foam Pad SLR,Hip/KneeFl,Step Ups        Foam Beam        P-Bar-GT-Forward, Backward,Side-Even & Dips        Monster Steps        Fitter-Slalom        T/G-Squats,PF   30x     W/P-PNF,IR,ER,PU,PS:Top,Mid,Bot        NuStep  10' L5 10' L5     Hilary-BP,Lats,PD,Row        Oqddebo-JP-By        NK Table        TM        UBC        Bike        Stepper        ME, PE 15' 15' 15'             Modalities 9/9 9/10 9/15     MH&ES        US

## 2020-09-17 ENCOUNTER — OFFICE VISIT (OUTPATIENT)
Dept: PHYSICAL THERAPY | Facility: CLINIC | Age: 25
End: 2020-09-17
Payer: COMMERCIAL

## 2020-09-17 DIAGNOSIS — Z48.89 AFTERCARE FOLLOWING SURGERY: Primary | ICD-10-CM

## 2020-09-17 DIAGNOSIS — M54.42 ACUTE BILATERAL LOW BACK PAIN WITH BILATERAL SCIATICA: ICD-10-CM

## 2020-09-17 DIAGNOSIS — M25.562 ACUTE PAIN OF LEFT KNEE: ICD-10-CM

## 2020-09-17 DIAGNOSIS — M54.41 ACUTE BILATERAL LOW BACK PAIN WITH BILATERAL SCIATICA: ICD-10-CM

## 2020-09-17 PROCEDURE — 97140 MANUAL THERAPY 1/> REGIONS: CPT

## 2020-09-17 NOTE — PROGRESS NOTES
Today's date: 2020  Patient name: Zaid Duran  : 1995  MRN: [id number]  Referring provider: Maureen Massey MD  Dx:   Encounter Diagnosis     ICD-10-CM    1  Aftercare following surgery  Z48 89    2  Acute bilateral low back pain with bilateral sciatica  M54 42     M54 41    3  Acute pain of left knee  M25 562      Subjective:  No new c/o pain today  Objective: See treatment log below  Sallie Escalona continues to be advised to follow her home exercise program as tolerated  When Sallie Escalona is feeling good she follows her rehab exercises in the gym and on other days she follows her home exercise program at home as tolerated  In the future we plan on having Meka stay at home and follow her home exercise program for four to six weeks and than assess for either more Rehab treatments or discharge from Rehab care  Assessment: Tolerated treatment well  Patient exhibited good technique with therapeutic exercises and would benefit from continued PT to increase ROM/strength and endurance to improve mobility  Meka's goals are to continue to improve with her rehab program and improve with functional mobility, speed, repetition and decreased c/o pain with her gym and home exercise program   Anali Nettles final goal for her rehab is to be discharged from Rehab care after obtaining her full functional rehab potential     Plan: Continue per plan of care  Progress treatment as tolerated  Precautions:  Low back pain and bilateral SIJ region pain with Radic on L LE    All treatments below will be provided with a focus on strengthening, flexibility, ROM, postural,   endurance and any possible swelling and pain which may be present without ignoring   neural issues involving balance, coordination and proprioception which is also important   and necessary to provide full functional mobility and quality care        Daily Treatment Log  Manual  9/9 9/10 9/15 9/17    MT, ROM 20' 30' 20' 20'    HEP        Exercise Log 9/9 9/10 9/15 9/17    Balance Board        Chair Squats        BOSU-Walk        Foam Pad SLR,Hip/KneeFl,Step Ups        Foam Beam        P-Bar-GT-Forward, Backward,Side-Even & Dips        Monster Steps        Fitter-Slalom        T/G-Squats,PF   30x NT    W/P-PNF,IR,ER,PU,PS:Top,Mid,Bot        NuStep  10' L5 10' L5 NT    Hilary-BP,Lats,PD,Row        Libpdve-EX-Ay        JUAN MANUEL Abarca, PE 15' 15' 15' 15'            Modalities 9/9 9/10 9/15 9/17    MH&ES        US

## 2020-09-22 ENCOUNTER — APPOINTMENT (OUTPATIENT)
Dept: PHYSICAL THERAPY | Facility: CLINIC | Age: 25
End: 2020-09-22
Payer: COMMERCIAL

## 2020-09-23 ENCOUNTER — APPOINTMENT (OUTPATIENT)
Dept: PHYSICAL THERAPY | Facility: CLINIC | Age: 25
End: 2020-09-23
Payer: COMMERCIAL

## 2020-09-23 ENCOUNTER — OFFICE VISIT (OUTPATIENT)
Dept: PHYSICAL THERAPY | Facility: CLINIC | Age: 25
End: 2020-09-23
Payer: COMMERCIAL

## 2020-09-23 DIAGNOSIS — M54.41 ACUTE BILATERAL LOW BACK PAIN WITH BILATERAL SCIATICA: ICD-10-CM

## 2020-09-23 DIAGNOSIS — M54.42 ACUTE BILATERAL LOW BACK PAIN WITH BILATERAL SCIATICA: ICD-10-CM

## 2020-09-23 DIAGNOSIS — Z48.89 AFTERCARE FOLLOWING SURGERY: Primary | ICD-10-CM

## 2020-09-23 DIAGNOSIS — M25.562 ACUTE PAIN OF LEFT KNEE: ICD-10-CM

## 2020-09-23 PROCEDURE — 97140 MANUAL THERAPY 1/> REGIONS: CPT | Performed by: PHYSICAL THERAPIST

## 2020-09-23 PROCEDURE — 97110 THERAPEUTIC EXERCISES: CPT | Performed by: PHYSICAL THERAPIST

## 2020-09-23 PROCEDURE — 97112 NEUROMUSCULAR REEDUCATION: CPT | Performed by: PHYSICAL THERAPIST

## 2020-09-23 NOTE — PROGRESS NOTES
Today's date: 2020  Patient name: Raina Nash  : 1995  MRN: [id number]  Referring provider: Prosper Mccann MD  Dx:   Encounter Diagnosis     ICD-10-CM    1  Aftercare following surgery  Z48 89    2  Acute bilateral low back pain with bilateral sciatica  M54 42     M54 41    3  Acute pain of left knee  M25 562      Subjective:  Lavern Melton states her neck and entire back region down to her tail bone is sore today  Objective: See treatment log below  Lavern Melton continues to be advised to follow her home exercise program as tolerated  When Lavern Melton is feeling good she follows her rehab exercises in the gym and on other days she follows her home exercise program at home as tolerated  In the future we plan on having Meka stay at home and follow her home exercise program for four to six weeks and than assess for either more Rehab treatments or discharge from Rehab care  Assessment: Tolerated treatment well  Patient exhibited good technique with therapeutic exercises and would benefit from continued PT  Meka's goals are to continue to improve with her rehab program and improve with functional mobility, speed, repetition and decreased c/o pain with her gym and home exercise program   [facility] final goal for her rehab is to be discharged from [address] [place] [address] after obtaining her full functional rehab potential     Plan: Continue per plan of care  Progress treatment as tolerated  Precautions:  Low back pain and bilateral SIJ region pain with Radic on L LE    All treatments below will be provided with a focus on strengthening, flexibility, ROM, postural,   endurance and any possible swelling and pain which may be present without ignoring   neural issues involving balance, coordination and proprioception which is also important   and necessary to provide full functional mobility and quality care        Daily Treatment Log  Manual  9/9 9/10 9/15 9/17 9/23   MT, ROM 20' 30' 20' 20' 23'   HEP        Exercise Log 9/9 9/10 9/15 9/17 9/23   Balance Board        Chair Squats        BOSU-Walk        Foam Pad SLR,Hip/KneeFl,Step Ups        Foam Beam        P-Bar-GT-Forward, Backward,Side-Even & Dips        Monster Steps        Fitter-Slalom        T/G-Squats,PF   30x NT 30x   W/P-PNF,IR,ER,PU,PS:Top,Mid,Bot        NuStep  10' L5 10' L5 NT 10' L5   Hilary-BP,Lats,PD,Row        Dzwfodn-NA-Ze        NK Abdulaziz Abarca, PE 15' 15' 15' 15' 15'           Modalities 9/9 9/10 9/15 9/17 9/23   MH&ES        US

## 2020-09-29 ENCOUNTER — APPOINTMENT (OUTPATIENT)
Dept: PHYSICAL THERAPY | Facility: CLINIC | Age: 25
End: 2020-09-29
Payer: COMMERCIAL

## 2020-09-30 ENCOUNTER — OFFICE VISIT (OUTPATIENT)
Dept: PHYSICAL THERAPY | Facility: CLINIC | Age: 25
End: 2020-09-30
Payer: COMMERCIAL

## 2020-09-30 DIAGNOSIS — M54.42 ACUTE BILATERAL LOW BACK PAIN WITH BILATERAL SCIATICA: ICD-10-CM

## 2020-09-30 DIAGNOSIS — M54.41 ACUTE BILATERAL LOW BACK PAIN WITH BILATERAL SCIATICA: ICD-10-CM

## 2020-09-30 DIAGNOSIS — M25.562 ACUTE PAIN OF LEFT KNEE: ICD-10-CM

## 2020-09-30 DIAGNOSIS — Z48.89 AFTERCARE FOLLOWING SURGERY: Primary | ICD-10-CM

## 2020-09-30 PROCEDURE — 97140 MANUAL THERAPY 1/> REGIONS: CPT

## 2020-09-30 PROCEDURE — 97112 NEUROMUSCULAR REEDUCATION: CPT

## 2020-09-30 NOTE — PROGRESS NOTES
Today's date: 2020  Patient name: Vidhi Figueroa  : 1995  MRN: [id number]  Referring provider: Martir Hollingsworth MD  Dx:   Encounter Diagnosis     ICD-10-CM    1  Aftercare following surgery  Z48 89    2  Acute bilateral low back pain with bilateral sciatica  M54 42     M54 41    3  Acute pain of left knee  M25 562      Subjective:  No new c/o pain today  Objective: See treatment log below  Sue continues to be advised to follow her home exercise program as tolerated  When Sue is feeling good she follows her rehab exercises in the gym and on other days she follows her home exercise program at home as tolerated  In the future we plan on having Meka stay at home and follow her home exercise program for four to six weeks and than assess for either more Rehab treatments or discharge from Rehab care  Assessment: Tolerated treatment well  Patient exhibited good technique with therapeutic exercises and would benefit from continued PT to increase ROM/strength and endurance to improve mobility  Meka's goals are to continue to improve with her rehab program and improve with functional mobility, speed, repetition and decreased c/o pain with her gym and home exercise program   Trevor Ellis final goal for her rehab is to be discharged from Rehab care after obtaining her full functional rehab potential     Plan: Continue per plan of care  Progress treatment as tolerated  Precautions:  Low back pain and bilateral SIJ region pain with Radic on L LE    All treatments below will be provided with a focus on strengthening, flexibility, ROM, postural,   endurance and any possible swelling and pain which may be present without ignoring   neural issues involving balance, coordination and proprioception which is also important   and necessary to provide full functional mobility and quality care        Daily Treatment Log  Manual     MT, ROM '    '   HEP        Exercise Log  Balance Board        Chair Squats        BOSU-Walk        Foam Pad SLR,Hip/KneeFl,Step Ups        Foam Beam        P-Bar-GT-Forward, Backward,Side-Even & Dips        Monster Steps        Fitter-Slalom        T/G-Squats,PF     30x   W/P-PNF,IR,ER,PU,PS:Top,Mid,Bot        NuStep 10' L5    10' L5   Hilary-BP,Lats,PD,Row        Dirspoi-LY-Ku        NK Table 10# 30x       TM        UBC        Bike        Stepper        MAYE Abarca 15'    15'           Modalities 9/30 9/23   MH&ES        US

## 2020-10-01 ENCOUNTER — OFFICE VISIT (OUTPATIENT)
Dept: PHYSICAL THERAPY | Facility: CLINIC | Age: 25
End: 2020-10-01
Payer: COMMERCIAL

## 2020-10-01 DIAGNOSIS — M54.42 ACUTE BILATERAL LOW BACK PAIN WITH BILATERAL SCIATICA: ICD-10-CM

## 2020-10-01 DIAGNOSIS — Z48.89 AFTERCARE FOLLOWING SURGERY: Primary | ICD-10-CM

## 2020-10-01 DIAGNOSIS — M54.41 ACUTE BILATERAL LOW BACK PAIN WITH BILATERAL SCIATICA: ICD-10-CM

## 2020-10-01 DIAGNOSIS — M25.562 ACUTE PAIN OF LEFT KNEE: ICD-10-CM

## 2020-10-01 PROCEDURE — 97140 MANUAL THERAPY 1/> REGIONS: CPT

## 2020-10-01 PROCEDURE — 97164 PT RE-EVAL EST PLAN CARE: CPT | Performed by: PHYSICAL THERAPIST

## 2020-10-01 PROCEDURE — 97112 NEUROMUSCULAR REEDUCATION: CPT

## 2020-10-06 ENCOUNTER — OFFICE VISIT (OUTPATIENT)
Dept: PHYSICAL THERAPY | Facility: CLINIC | Age: 25
End: 2020-10-06
Payer: COMMERCIAL

## 2020-10-06 DIAGNOSIS — M54.41 ACUTE BILATERAL LOW BACK PAIN WITH BILATERAL SCIATICA: ICD-10-CM

## 2020-10-06 DIAGNOSIS — Z48.89 AFTERCARE FOLLOWING SURGERY: Primary | ICD-10-CM

## 2020-10-06 DIAGNOSIS — M25.562 ACUTE PAIN OF LEFT KNEE: ICD-10-CM

## 2020-10-06 DIAGNOSIS — M54.42 ACUTE BILATERAL LOW BACK PAIN WITH BILATERAL SCIATICA: ICD-10-CM

## 2020-10-06 PROCEDURE — 97140 MANUAL THERAPY 1/> REGIONS: CPT

## 2020-10-08 ENCOUNTER — OFFICE VISIT (OUTPATIENT)
Dept: PHYSICAL THERAPY | Facility: CLINIC | Age: 25
End: 2020-10-08
Payer: COMMERCIAL

## 2020-10-08 DIAGNOSIS — M54.42 ACUTE BILATERAL LOW BACK PAIN WITH BILATERAL SCIATICA: ICD-10-CM

## 2020-10-08 DIAGNOSIS — Z48.89 AFTERCARE FOLLOWING SURGERY: Primary | ICD-10-CM

## 2020-10-08 DIAGNOSIS — M25.562 ACUTE PAIN OF LEFT KNEE: ICD-10-CM

## 2020-10-08 DIAGNOSIS — M54.41 ACUTE BILATERAL LOW BACK PAIN WITH BILATERAL SCIATICA: ICD-10-CM

## 2020-10-08 PROCEDURE — 97140 MANUAL THERAPY 1/> REGIONS: CPT

## 2020-10-13 ENCOUNTER — APPOINTMENT (OUTPATIENT)
Dept: PHYSICAL THERAPY | Facility: CLINIC | Age: 25
End: 2020-10-13
Payer: COMMERCIAL

## 2020-11-10 ENCOUNTER — OFFICE VISIT (OUTPATIENT)
Dept: PHYSICAL THERAPY | Facility: CLINIC | Age: 25
End: 2020-11-10
Payer: COMMERCIAL

## 2020-11-10 DIAGNOSIS — Z48.89 AFTERCARE FOLLOWING SURGERY: Primary | ICD-10-CM

## 2020-11-10 DIAGNOSIS — M25.562 ACUTE PAIN OF LEFT KNEE: ICD-10-CM

## 2020-11-10 DIAGNOSIS — M54.42 ACUTE BILATERAL LOW BACK PAIN WITH BILATERAL SCIATICA: ICD-10-CM

## 2020-11-10 DIAGNOSIS — M54.41 ACUTE BILATERAL LOW BACK PAIN WITH BILATERAL SCIATICA: ICD-10-CM

## 2020-11-10 PROCEDURE — 97164 PT RE-EVAL EST PLAN CARE: CPT | Performed by: PHYSICAL THERAPIST

## 2020-11-10 PROCEDURE — 97140 MANUAL THERAPY 1/> REGIONS: CPT

## 2020-11-17 ENCOUNTER — OFFICE VISIT (OUTPATIENT)
Dept: PHYSICAL THERAPY | Facility: CLINIC | Age: 25
End: 2020-11-17
Payer: COMMERCIAL

## 2020-11-17 DIAGNOSIS — Z48.89 AFTERCARE FOLLOWING SURGERY: Primary | ICD-10-CM

## 2020-11-17 DIAGNOSIS — M25.562 ACUTE PAIN OF LEFT KNEE: ICD-10-CM

## 2020-11-17 DIAGNOSIS — M54.41 ACUTE BILATERAL LOW BACK PAIN WITH BILATERAL SCIATICA: ICD-10-CM

## 2020-11-17 DIAGNOSIS — M54.42 ACUTE BILATERAL LOW BACK PAIN WITH BILATERAL SCIATICA: ICD-10-CM

## 2020-11-17 PROCEDURE — 97014 ELECTRIC STIMULATION THERAPY: CPT

## 2020-11-17 PROCEDURE — 97140 MANUAL THERAPY 1/> REGIONS: CPT

## 2020-11-17 PROCEDURE — G0283 ELEC STIM OTHER THAN WOUND: HCPCS

## 2020-11-27 ENCOUNTER — OFFICE VISIT (OUTPATIENT)
Dept: OBGYN CLINIC | Facility: CLINIC | Age: 25
End: 2020-11-27
Payer: COMMERCIAL

## 2020-11-27 VITALS
DIASTOLIC BLOOD PRESSURE: 78 MMHG | HEART RATE: 98 BPM | BODY MASS INDEX: 36.7 KG/M2 | SYSTOLIC BLOOD PRESSURE: 116 MMHG | WEIGHT: 215 LBS | HEIGHT: 64 IN

## 2020-11-27 DIAGNOSIS — S83.005A DISLOCATED PATELLA, LEFT, INITIAL ENCOUNTER: Primary | ICD-10-CM

## 2020-11-27 PROCEDURE — 99212 OFFICE O/P EST SF 10 MIN: CPT | Performed by: ORTHOPAEDIC SURGERY

## 2020-12-07 ENCOUNTER — TELEPHONE (OUTPATIENT)
Dept: OBGYN CLINIC | Facility: CLINIC | Age: 25
End: 2020-12-07

## 2020-12-10 ENCOUNTER — HOSPITAL ENCOUNTER (OUTPATIENT)
Dept: MRI IMAGING | Facility: HOSPITAL | Age: 25
Discharge: HOME | End: 2020-12-10
Payer: COMMERCIAL

## 2020-12-10 DIAGNOSIS — S83.005A DISLOCATED PATELLA, LEFT, INITIAL ENCOUNTER: ICD-10-CM

## 2020-12-10 PROCEDURE — G1004 CDSM NDSC: HCPCS

## 2020-12-10 PROCEDURE — 73721 MRI JNT OF LWR EXTRE W/O DYE: CPT

## 2020-12-18 ENCOUNTER — OFFICE VISIT (OUTPATIENT)
Dept: OBGYN CLINIC | Facility: CLINIC | Age: 25
End: 2020-12-18
Payer: COMMERCIAL

## 2020-12-18 VITALS
WEIGHT: 215 LBS | SYSTOLIC BLOOD PRESSURE: 115 MMHG | DIASTOLIC BLOOD PRESSURE: 76 MMHG | BODY MASS INDEX: 36.7 KG/M2 | HEIGHT: 64 IN | HEART RATE: 114 BPM

## 2020-12-18 DIAGNOSIS — S83.005A DISLOCATED PATELLA, LEFT, INITIAL ENCOUNTER: Primary | ICD-10-CM

## 2020-12-18 PROCEDURE — 99213 OFFICE O/P EST LOW 20 MIN: CPT | Performed by: ORTHOPAEDIC SURGERY

## 2021-01-14 ENCOUNTER — OFFICE VISIT (OUTPATIENT)
Dept: PHYSICAL THERAPY | Facility: CLINIC | Age: 26
End: 2021-01-14
Payer: COMMERCIAL

## 2021-01-14 DIAGNOSIS — M54.2 NECK PAIN: Primary | ICD-10-CM

## 2021-01-14 DIAGNOSIS — M25.511 ACUTE PAIN OF RIGHT SHOULDER: ICD-10-CM

## 2021-01-14 DIAGNOSIS — M25.512 ACUTE PAIN OF LEFT SHOULDER: ICD-10-CM

## 2021-01-14 PROCEDURE — 97140 MANUAL THERAPY 1/> REGIONS: CPT | Performed by: PHYSICAL THERAPIST

## 2021-01-14 PROCEDURE — 97535 SELF CARE MNGMENT TRAINING: CPT | Performed by: PHYSICAL THERAPIST

## 2021-01-14 PROCEDURE — 97162 PT EVAL MOD COMPLEX 30 MIN: CPT | Performed by: PHYSICAL THERAPIST

## 2021-01-14 NOTE — PROGRESS NOTES
PT Evaluation   Today's date: 2021  Patient name: Tayla Funez  : 1995  MRN: [id number]  Referring provider: Kaushik Garcia MD  Dx:   Encounter Diagnosis     ICD-10-CM    1  Neck pain  M54 2    2  Acute pain of left shoulder  M25 512    3  Acute pain of right shoulder  M25 511      Assessment  Assessment details: Patient reports neck and bilateral shoulder pain  Limitations with use of her neck and arms / shoulders  Impairments: abnormal or restricted ROM, abnormal movement, activity intolerance, impaired balance, impaired physical strength, lacks appropriate home exercise program, pain with function, safety issue and scapular dyskinesis  Understanding of Dx/Px/POC: excellent   Prognosis: good    Goals  STG 2-4 weeks:   Increase Neck strength by 3-6 lbs  Decrease pain to <5/10 with activity  Increase Neck PROM to [facility] all planes  Initiate HEP  LTG 6-8 weeks:   Demonstrate Neck AROM WFL all planes  Decrease pain to 1-2/10 with activity  Increase Neck strength by 10-15 lbs  Improve UE flexibility  Improve strength with UE by 10-15 lbs  Patient independent with HEP  Plan  Plan details: All planned modality interventions and planned therapy interventions are provided PRN    Patient would benefit from: PT eval and skilled physical therapy  Planned modality interventions: ultrasound, unattended electrical stimulation and TENS  Planned therapy interventions: joint mobilization, manual therapy, neuromuscular re-education, patient education, postural training, self care, strengthening, stretching, therapeutic activities, therapeutic exercise, therapeutic training, transfer training, home exercise program, graded exercise, flexibility and coordination  Frequency: 3x week  Duration in weeks: 12  Treatment plan discussed with: patient      Subjective Evaluation    Pain  Quality: discomfort, knife-like, pulling, squeezing, tight, throbbing and sharp  Relieving factors: support, rest, relaxation and change in position  Aggravating factors: lifting, overhead activity and keyboarding    Treatments  Previous treatment: physical therapy  Current treatment: physical therapy  Patient Goals  Patient goals for therapy: decreased pain, increased motion, return to work, return to [employer] activities, independence with ADLs/IADLs and increased strength      Date of onset:  11/15/2020    Date of Surgery:  None    History of Present Episode: 1/14/2021  Skytop states her neck and bilateral shoulder regions flared up  Past Medical History:    1/14/2021  Skytop reports asthma  Knee issues  Low back issues and neck issues  Previous Level of Functional Ability:  1/14/2021  Skytop states her neck and shoulders had been feeling pretty good  She is now flared up  Inspection / Palpation:  Neck:  1/14/2021  Mesomorphic / Endomorphic body type  No signs of infection  No signs of wounds  No signs of drainage  No signs of ecchymotic regions  No signs of erythremic regions  Mild to moderate signs of muscle spasm  Mild to moderate signs of muscle guarding  Mild to moderate signs of tenderness reported to palpation  No signs of atrophy noted  No signs of swelling  No signs of a surgery site  Current conditions appears consistent with recent acute episode  Chief Complaints:  1/14/2021  [place]ytop reports mild difficulty with standing  [place] reports mild difficulty with walking  [place] reports mild to moderate difficulty with movement / use of her neck region  [place] reports mild to moderate difficulty with use of her arms  [place] reports mild to moderate difficulty with sleeping  [place] reports mild to moderate difficulty with her strength and endurance  [place] reports mild to moderate limitations with her neck range of motion  [place] reports mild difficulty lying on her neck region  [place] reports mild to moderate difficulty twisting / turning her neck region      NECK PAIN  Resting Palpation Bending  Forward Rotate  Right Rotate  Left   1/14/2021 2-5 2-5 4-6 2-5 2-5     NECK PAIN Driving Sleeping Moving Extending Overhead   1/14/2021 2-5 2-5 2-5 5 5     NECK AROM Flexion Extension Rotation Right   1/14/2021 45° 45° 85°     NECK AROM Rotation Left Side Bend Right Side Bend Left   1/14/2021 84° 28° 29°     NECK MMT / PAIN Flexion Extension Rotation Right   1/14/2021 3/10 8 lbs 3/10 10 lbs 3/10 7 lbs     NECK MMT / PAIN Rotation Left Side Bend Right Side Bend Left   1/14/2021 3/10 6 lbs 3/10 8 lbs 3/10 8 lbs     UE MMT / PAIN Biceps Triceps Wrist Ext   1/14/2021  Rt 0/10  28 lbs 0/10  30 lbs  0/10  25 lbs   1/14/2021  Lt 3/10  18 lbs 3/10  18 lbs  3/10  15 lbs     UE  MMT / PAIN Wrist Flex Shld Abd Shld shrug   1/14/2021  Rt 0/10  28 lbs 0/10  30 lbs 0/10  35 lbs   1/14/2021  Lt 3/10  19 lbs 3/10  18 lbs 3/10  21 lbs     Neck Screen Compression Distraction Slump Test Epting / Vertigo   1/14/2021 Rt Negative Negative Negative Negative   1/14/2021 Lt POSITIVE Negative Negative Negative     Neck Screen Swallowing Valsalva Adson TMJ   1/14/2021 Rt Negative Negative Negative Negative   1/14/2021 Lt Negative Negative Negative Negative     Neck Screen Referred Pain Thoracic outlet Crepitus   1/14/2021 Rt Negative Negative Negative   1/14/2021 Lt POSITIVE Negative Negative     Precautions:  Neck and Bilateral Shoulder Issues    All treatments below will be provided with a focus on strengthening, flexibility, ROM, postural,   endurance and any possible swelling and pain which may be present without ignoring   neural issues involving balance, coordination and proprioception which is also important   and necessary to provide full functional mobility and quality care        Daily Treatment Log  Manual  1/14       MT, ROM 25'       HEP 15'       Exercise Log 1/14       Digiflex, Powerweb        FWC, Codman's, etc        UBC        Hilary-BP,PD,Lats, Row        NuStep W/P-PNF,IR,ER,Pu,Ps,Throw-Top  Mid,Bot        Finger Ladder        ME, PE 15'       CX TX                Modalities 1/14       Crissy 75 / Jorge Luis Brambila / Pj Blackwell

## 2021-01-14 NOTE — LETTER
2021  PT Evaluation Plan of [address] Mony Joya MD  [address]    Patient: Madison Sheth   YOB: 1995   Date of Visit: 2021     Encounter Diagnosis     ICD-10-CM    1  Neck pain  M54 2    2  Acute pain of left shoulder  M25 512    3  Acute pain of right shoulder  M25 511      Dear Dr Solis Jones:  Thank you for your recent referral of Madison Sheth  Please review the attached evaluation summary from [place] [address] recent visit  Please verify that you agree with the plan of care by signing the attached order  If you have any questions or concerns, please do not hesitate to call  I sincerely appreciate the opportunity to share in the care of one of your patients and hope to have another opportunity to work with you in the near future  Sincerely,    Nitesh Goodman, PT    Referring Provider:      I certify that I have read the below Plan of Care and certify the need for these services furnished under this plan of treatment while under my care  Jerri Morgan MD  [address] [place] [address] [phone]  Via In Basket    Please SIGN ABOVE and return THIS PAGE ONLY to Fax # [phone]          PT Evaluation   Today's date: 2021  Patient name: Madison Sheth  : 1995  MRN: [id number]  Referring provider: Shala Cadet MD  Dx:   Encounter Diagnosis     ICD-10-CM    1  Neck pain  M54 2    2  Acute pain of left shoulder  M25 512    3  Acute pain of right shoulder  M25 511      Assessment  Assessment details: Patient reports neck and bilateral shoulder pain  Limitations with use of her neck and arms / shoulders    Impairments: abnormal or restricted ROM, abnormal movement, activity intolerance, impaired balance, impaired physical strength, lacks appropriate home exercise program, pain with function, safety issue and scapular dyskinesis  Understanding of Dx/Px/POC: excellent   Prognosis: good    Goals  STG 2-4 weeks:   Increase Neck strength by 3-6 lbs  Decrease pain to <5/10 with activity  Increase Neck PROM to [facility] all planes  Initiate HEP  LTG 6-8 weeks:   Demonstrate Neck AROM WFL all planes  Decrease pain to 1-2/10 with activity  Increase Neck strength by 10-15 lbs  Improve UE flexibility  Improve strength with UE by 10-15 lbs  Patient independent with HEP  Plan  Plan details: All planned modality interventions and planned therapy interventions are provided PRN  Patient would benefit from: PT eval and skilled physical therapy  Planned modality interventions: ultrasound, unattended electrical stimulation and TENS  Planned therapy interventions: joint mobilization, manual therapy, neuromuscular re-education, patient education, postural training, self care, strengthening, stretching, therapeutic activities, therapeutic exercise, therapeutic training, transfer training, home exercise program, graded exercise, flexibility and coordination  Frequency: 3x week  Duration in weeks: 12  Treatment plan discussed with: patient      Subjective Evaluation    Pain  Quality: discomfort, knife-like, pulling, squeezing, tight, throbbing and sharp  Relieving factors: support, rest, relaxation and change in position  Aggravating factors: lifting, overhead activity and keyboarding    Treatments  Previous treatment: physical therapy  Current treatment: physical therapy  Patient Goals  Patient goals for therapy: decreased pain, increased motion, return to work, return to [employer] activities, independence with ADLs/IADLs and increased strength      Date of onset:  11/15/2020    Date of Surgery:  None    History of Present Episode: 1/14/2021  Jhoan Vivas states her neck and bilateral shoulder regions flared up  Past Medical History:    1/14/2021  Jhoan Vivas reports asthma  Knee issues  Low back issues and neck issues  Previous Level of Functional Ability:  1/14/2021  Jhoan Vivas states her neck and shoulders had been feeling pretty good    She is now flared up  Inspection / Palpation:  Neck:  1/14/2021  Mesomorphic / Endomorphic body type  No signs of infection  No signs of wounds  No signs of drainage  No signs of ecchymotic regions  No signs of erythremic regions  Mild to moderate signs of muscle spasm  Mild to moderate signs of muscle guarding  Mild to moderate signs of tenderness reported to palpation  No signs of atrophy noted  No signs of swelling  No signs of a surgery site  Current conditions appears consistent with recent acute episode  Chief Complaints:  1/14/2021  Junaid Bone reports mild difficulty with standing  Junaid Bone reports mild difficulty with walking  Junaid Bone reports mild to moderate difficulty with movement / use of her neck region  Junaid Bone reports mild to moderate difficulty with use of her arms  Junaid Bone reports mild to moderate difficulty with sleeping  Junaid Bone reports mild to moderate difficulty with her strength and endurance  Junaid Bone reports mild to moderate limitations with her neck range of motion  Junaid Bone reports mild difficulty lying on her neck region  Junaid Bone reports mild to moderate difficulty twisting / turning her neck region      NECK PAIN  Resting Palpation Bending  Forward Rotate  Right Rotate  Left   1/14/2021 2-5 2-5 4-6 2-5 2-5     NECK PAIN Driving Sleeping Moving Extending Overhead   1/14/2021 2-5 2-5 2-5 5 5     NECK AROM Flexion Extension Rotation Right   1/14/2021 45° 45° 85°     NECK AROM Rotation Left Side Bend Right Side Bend Left   1/14/2021 84° 28° 29°     NECK MMT / PAIN Flexion Extension Rotation Right   1/14/2021 3/10 8 lbs 3/10 10 lbs 3/10 7 lbs     NECK MMT / PAIN Rotation Left Side Bend Right Side Bend Left   1/14/2021 3/10 6 lbs 3/10 8 lbs 3/10 8 lbs     UE MMT / PAIN Biceps Triceps Wrist Ext   1/14/2021  Rt 0/10  28 lbs 0/10  30 lbs  0/10  25 lbs   1/14/2021  Lt 3/10  18 lbs 3/10  18 lbs  3/10  15 lbs     UE  MMT / PAIN Wrist Flex Shld Abd Shld shrug   1/14/2021  Rt 0/10  28 lbs 0/10  30 lbs 0/10  35 lbs   1/14/2021  Lt 3/10  19 lbs 3/10  18 lbs 3/10  21 lbs     Neck Screen Compression Distraction Slump Test Epting / Vertigo   1/14/2021 Rt Negative Negative Negative Negative   1/14/2021 Lt POSITIVE Negative Negative Negative     Neck Screen Swallowing Valsalva Adson TMJ   1/14/2021 Rt Negative Negative Negative Negative   1/14/2021 Lt Negative Negative Negative Negative     Neck Screen Referred Pain Thoracic outlet Crepitus   1/14/2021 Rt Negative Negative Negative   1/14/2021 Lt POSITIVE Negative Negative     Precautions:  Neck and Bilateral Shoulder Issues    All treatments below will be provided with a focus on strengthening, flexibility, ROM, postural,   endurance and any possible swelling and pain which may be present without ignoring   neural issues involving balance, coordination and proprioception which is also important   and necessary to provide full functional mobility and quality care  Daily Treatment Log  Manual  1/14       MT, ROM 25'       HEP 15'       Exercise Log 1/14       Digiflex, Powerweb        FWC, Codman's, etc        [facility]        Hilary-BP,PD,Lats, Row        NuStep        W/P-PNF,IR,ER,Pu,Ps,Throw-Top  Mid,Bot        Finger Ladder        ME, PE 15'       CX TX                Modalities 1/14       Yanna Block / Natan Pretty / Manuel Torre

## 2021-01-19 ENCOUNTER — APPOINTMENT (OUTPATIENT)
Dept: PHYSICAL THERAPY | Facility: CLINIC | Age: 26
End: 2021-01-19
Payer: COMMERCIAL

## 2021-01-20 ENCOUNTER — OFFICE VISIT (OUTPATIENT)
Dept: PHYSICAL THERAPY | Facility: CLINIC | Age: 26
End: 2021-01-20
Payer: COMMERCIAL

## 2021-01-20 DIAGNOSIS — M25.512 ACUTE PAIN OF LEFT SHOULDER: ICD-10-CM

## 2021-01-20 DIAGNOSIS — M54.2 NECK PAIN: Primary | ICD-10-CM

## 2021-01-20 DIAGNOSIS — M25.511 ACUTE PAIN OF RIGHT SHOULDER: ICD-10-CM

## 2021-01-20 PROCEDURE — 97112 NEUROMUSCULAR REEDUCATION: CPT | Performed by: PHYSICAL THERAPIST

## 2021-01-20 PROCEDURE — 97140 MANUAL THERAPY 1/> REGIONS: CPT | Performed by: PHYSICAL THERAPIST

## 2021-01-20 PROCEDURE — 97110 THERAPEUTIC EXERCISES: CPT | Performed by: PHYSICAL THERAPIST

## 2021-01-21 NOTE — PROGRESS NOTES
Today's date: 2021  Patient name: Servando Celaya  : 1995  MRN: [id number]  Referring provider: Parth Young MD  Dx:   Encounter Diagnosis     ICD-10-CM    1  Neck pain  M54 2    2  Acute pain of left shoulder  M25 512    3  Acute pain of right shoulder  M25 511      Subjective:  Iam Campos states her neck and both shoulders are sore today  Objective: See treatment log below  Iam Campos continues to be advised to follow her home exercise program as tolerated  When Iam Campos is feeling good she follows her rehab exercises in the gym and on other days she follows her home exercise program at home as tolerated  In the future we plan on having Meka stay at home and follow her home exercise program for four to six weeks and than assess for either more Rehab treatments or discharge from Rehab care  Assessment: Tolerated treatment well  Patient exhibited good technique with therapeutic exercises and would benefit from continued PT  Meka's goals are to continue to improve with her rehab program and improve with functional mobility, speed, repetition and decreased c/o pain with her gym and home exercise program   Malena Ko final goal for her rehab is to be discharged from [address] [place] [address] after obtaining her full functional rehab potential     Plan: Continue per plan of care  Progress treatment as tolerated  Precautions:  Neck and Bilateral Shoulder Issues    All treatments below will be provided with a focus on strengthening, flexibility, ROM, postural,   endurance and any possible swelling and pain which may be present without ignoring   neural issues involving balance, coordination and proprioception which is also important   and necessary to provide full functional mobility and quality care        Daily Treatment Log  Manual        MT, ROM 25' 25'      HEP 15'       Exercise Log       Digiflex, Powerweb        FWC, Codman's, etc        UBC        Hilary-BP,PD,Lats, Row        NuStep W/P-PNF,IR,ER,Pu,Ps,Throw-Top  Mid,Bot        Finger Ladder        ME, PE 15' 15'      CX TX                Modalities 1/14 1/20      MH / PE / ES  21'      US

## 2021-02-09 NOTE — PROGRESS NOTES
PT Discharge  Today's date: 2021  Patient name: Servando Celaya  : 1995  MRN: [id number]  Referring provider: Parth Young MD  Dx:   Encounter Diagnosis     ICD-10-CM    1  Neck pain  M54 2    2  Acute pain of left shoulder  M25 512    3  Acute pain of right shoulder  M25 511      Assessment/Plan    Subjective    Objective     2021  Servando Celaya has not returned for more treatment  Servando Celaya did not attend today's appointment  I cannot provide you with a current progress report but I can provide you with information based on previous performance  Servando Celaya is discharged at this time

## 2021-03-25 ENCOUNTER — HOSPITAL ENCOUNTER (EMERGENCY)
Facility: HOSPITAL | Age: 26
Discharge: HOME | End: 2021-03-25
Attending: EMERGENCY MEDICINE | Admitting: EMERGENCY MEDICINE
Payer: COMMERCIAL

## 2021-03-25 VITALS
DIASTOLIC BLOOD PRESSURE: 56 MMHG | TEMPERATURE: 97.8 F | OXYGEN SATURATION: 98 % | SYSTOLIC BLOOD PRESSURE: 121 MMHG | HEART RATE: 86 BPM | RESPIRATION RATE: 18 BRPM

## 2021-03-25 DIAGNOSIS — G43.409 HEMIPLEGIC MIGRAINE WITHOUT STATUS MIGRAINOSUS, NOT INTRACTABLE: Primary | ICD-10-CM

## 2021-03-25 PROCEDURE — 96375 TX/PRO/DX INJ NEW DRUG ADDON: CPT

## 2021-03-25 PROCEDURE — 99283 EMERGENCY DEPT VISIT LOW MDM: CPT

## 2021-03-25 PROCEDURE — 96361 HYDRATE IV INFUSION ADD-ON: CPT

## 2021-03-25 PROCEDURE — 99284 EMERGENCY DEPT VISIT MOD MDM: CPT | Performed by: EMERGENCY MEDICINE

## 2021-03-25 PROCEDURE — 96365 THER/PROPH/DIAG IV INF INIT: CPT

## 2021-03-25 RX ORDER — METOCLOPRAMIDE HYDROCHLORIDE 5 MG/ML
10 INJECTION INTRAMUSCULAR; INTRAVENOUS ONCE
Status: COMPLETED | OUTPATIENT
Start: 2021-03-25 | End: 2021-03-25

## 2021-03-25 RX ORDER — MAGNESIUM SULFATE HEPTAHYDRATE 40 MG/ML
2 INJECTION, SOLUTION INTRAVENOUS ONCE
Status: COMPLETED | OUTPATIENT
Start: 2021-03-25 | End: 2021-03-25

## 2021-03-25 RX ORDER — ONDANSETRON 2 MG/ML
4 INJECTION INTRAMUSCULAR; INTRAVENOUS ONCE
Status: COMPLETED | OUTPATIENT
Start: 2021-03-25 | End: 2021-03-25

## 2021-03-25 RX ORDER — DIPHENHYDRAMINE HYDROCHLORIDE 50 MG/ML
25 INJECTION INTRAMUSCULAR; INTRAVENOUS ONCE
Status: COMPLETED | OUTPATIENT
Start: 2021-03-25 | End: 2021-03-25

## 2021-03-25 RX ORDER — BUDESONIDE AND FORMOTEROL FUMARATE DIHYDRATE 80; 4.5 UG/1; UG/1
2 AEROSOL RESPIRATORY (INHALATION) DAILY
COMMUNITY
Start: 2021-02-16

## 2021-03-25 RX ORDER — KETOROLAC TROMETHAMINE 30 MG/ML
15 INJECTION, SOLUTION INTRAMUSCULAR; INTRAVENOUS ONCE
Status: COMPLETED | OUTPATIENT
Start: 2021-03-25 | End: 2021-03-25

## 2021-03-25 RX ADMIN — ONDANSETRON 4 MG: 2 INJECTION INTRAMUSCULAR; INTRAVENOUS at 18:46

## 2021-03-25 RX ADMIN — SODIUM CHLORIDE 1000 ML: 0.9 INJECTION, SOLUTION INTRAVENOUS at 18:46

## 2021-03-25 RX ADMIN — METOCLOPRAMIDE HYDROCHLORIDE 10 MG: 5 INJECTION INTRAMUSCULAR; INTRAVENOUS at 19:28

## 2021-03-25 RX ADMIN — DIPHENHYDRAMINE HYDROCHLORIDE 25 MG: 50 INJECTION, SOLUTION INTRAMUSCULAR; INTRAVENOUS at 19:29

## 2021-03-25 RX ADMIN — KETOROLAC TROMETHAMINE 15 MG: 30 INJECTION, SOLUTION INTRAMUSCULAR; INTRAVENOUS at 18:47

## 2021-03-25 RX ADMIN — MAGNESIUM SULFATE HEPTAHYDRATE 2 G: 40 INJECTION, SOLUTION INTRAVENOUS at 19:42

## 2021-03-25 RX ADMIN — SODIUM CHLORIDE 1000 ML: 0.9 INJECTION, SOLUTION INTRAVENOUS at 19:42

## 2021-03-25 NOTE — ED PROVIDER NOTES
History  Chief Complaint   Patient presents with    Migraine     pt began with a migraine one hour ago, hx of migraines     Patient is a 45-year-old female presenting to the emergency department today complaining of migraine headache with intermittent perioral and bilateral hand tingling sensation, patient reports a history of hemiplegic migraines in the past with similar symptoms, she denies any fever or chills, no head injury, she does have nausea associated with her pain but no vomiting, no abdominal pain, no diarrhea, patient denies pregnancy, denies any COVID exposure or recent COVID infection          Prior to Admission Medications   Prescriptions Last Dose Informant Patient Reported? Taking? QUEtiapine (SEROquel) 200 mg tablet  Self Yes Yes   Sig: Take 250 mg by mouth daily at bedtime    albuterol (PROVENTIL HFA,VENTOLIN HFA) 90 mcg/act inhaler   Yes Yes   Sig: Inhale 2 puffs every 6 (six) hours as needed for wheezing   budesonide-formoterol (Symbicort) 80-4 5 MCG/ACT inhaler   Yes Yes   Sig: TAKE 2 PUFFS BY MOUTH TWICE A DAY   citalopram (CeleXA) 20 mg tablet  Self Yes Yes   Sig: Take 40 mg by mouth daily    fluticasone (FLOVENT HFA) 110 MCG/ACT inhaler   Yes Yes   Sig: Inhale 2 puffs 2 (two) times a day Rinse mouth after use     ibuprofen (MOTRIN) 800 mg tablet   No No   Sig: Take 1 tablet (800 mg total) by mouth every 8 (eight) hours for 5 days   levothyroxine 100 mcg tablet   Yes Yes   Sig: Take 100 mcg by mouth daily   norgestimate-ethinyl estradiol (ORTHO-CYCLEN) 0 25-35 MG-MCG per tablet   Yes No   Sig: Take 1 tablet by mouth daily      Facility-Administered Medications: None       Past Medical History:   Diagnosis Date    Asthma     Bipolar 1 disorder ([facility] [address] )     Depression     Disease of thyroid gland     Nerve disorder     Pet allergy     Seasonal allergies        Past Surgical History:   Procedure Laterality Date    BACK SURGERY      KNEE ARTHROSCOPY Right     KNEE ARTHROSCOPY W/ ACL RECONSTRUCTION      AL KNEE SCOPE,FULL SYNOVECT Right 9/16/2019    Procedure: ARTHROSCOPY KNEE  arthroscopy of right knee 1st, mini open right MPFL reconstruction with allograft;  Surgeon: Baldev Odonnell MD;  [facility];  Service: Orthopedics    REVISION OF SCAR Right 11/18/2019    Procedure: Stephanie Grubbs;  Surgeon: Baldev Odonnell MD;  [facility];  Service: Orthopedics    WISDOM TOOTH EXTRACTION         Family History   Problem Relation Age of Onset    Pneumonia Mother     Multiple sclerosis Father      I have reviewed and agree with the history as documented  E-Cigarette/Vaping    E-Cigarette Use Current Every Day User      E-Cigarette/Vaping Substances    Nicotine No     THC No     CBD No     Flavoring No     Other No     Unknown No      Social History     Tobacco Use    Smoking status: Current Every Day Smoker     Types: E-Cigarettes    Smokeless tobacco: Current User    Tobacco comment: vapes   Substance Use Topics    Alcohol use: Not Currently    Drug use: Yes     Types: Marijuana     Comment: has not used for 3 m,[facility]       Review of Systems   Constitutional: Negative  HENT: Negative  Eyes: Negative  Respiratory: Negative  Cardiovascular: Negative  Gastrointestinal: Positive for nausea  Endocrine: Negative  Genitourinary: Negative  Musculoskeletal: Negative  Skin: Negative  Allergic/Immunologic: Negative  Neurological: Positive for headaches (With perioral and bilateral hand tingling sensation)  Hematological: Negative  Psychiatric/Behavioral: Negative  Physical Exam  Physical Exam  Constitutional:       Appearance: She is well-developed  HENT:      Head: Normocephalic and atraumatic  Eyes:      General: No visual field deficit  Conjunctiva/sclera: Conjunctivae normal       Pupils: Pupils are equal, round, and reactive to light  Neck:      Musculoskeletal: Normal range of motion and neck supple  Cardiovascular:      Rate and Rhythm: Normal rate  Pulmonary:      Effort: Pulmonary effort is normal    Abdominal:      Palpations: Abdomen is soft  Musculoskeletal: Normal range of motion  Skin:     General: Skin is warm and dry  Neurological:      Mental Status: She is alert and oriented to person, place, and time  GCS: GCS eye subscore is 4  GCS verbal subscore is 5  GCS motor subscore is 6  Cranial Nerves: No cranial nerve deficit, dysarthria or facial asymmetry  Sensory: Sensation is intact  Motor: Motor function is intact  No weakness           Vital Signs  ED Triage Vitals [03/25/21 1829]   Temperature Pulse Respirations Blood Pressure SpO2   97 8 °F (36 6 °C) 86 18 121/56 98 %      Temp Source Heart Rate Source Patient Position - Orthostatic VS BP Location FiO2 (%)   Temporal Monitor Lying Left arm --      Pain Score       8           Vitals:    03/25/21 1829   BP: 121/56   Pulse: 86   Patient Position - Orthostatic VS: Lying               ED Medications  Medications   magnesium sulfate 2 g/50 mL IVPB (premix) 2 g (2 g Intravenous New Bag 3/25/21 1942)   sodium chloride 0 9 % bolus 1,000 mL (1,000 mL Intravenous New Bag 3/25/21 1942)   sodium chloride 0 9 % bolus 1,000 mL (0 mL Intravenous Stopped 3/25/21 1941)   ondansetron (ZOFRAN) injection 4 mg (4 mg Intravenous Given 3/25/21 1846)   ketorolac (TORADOL) injection 15 mg (15 mg Intravenous Given 3/25/21 1847)   metoclopramide (REGLAN) injection 10 mg (10 mg Intravenous Given 3/25/21 1928)   diphenhydrAMINE (BENADRYL) injection 25 mg (25 mg Intravenous Given 3/25/21 1929)       Diagnostic Studies  Results Reviewed     None                 No orders to display                     ED Course  ED Course as of Mar 25 2004   u Mar 25, 2021   2003 Patient reports feeling much better, headache is resolved and she wishes to go home at this time, advised to drink plenty of fluids, take Tylenol or Motrin as needed for headache at home, follow-up with primary care or return if symptoms worsen, patient acknowledges understanding and agreement with this plan                                SBIRT 22yo+      Most Recent Value   SBIRT (22 yo +)   In order to provide better care to our patients, we are screening all of our patients for alcohol and drug use  Would it be okay to ask you these screening questions? Unable to answer at this time Filed at: 03/25/2021 1845                      Disposition  Final diagnoses:   Hemiplegic migraine without status migrainosus, not intractable     Time reflects when diagnosis was documented in both MDM as applicable and the Disposition within this note     Time User Action Codes Description Comment    3/25/2021  8:04 PM Raffi Mclaughlin Add [G43 409] Hemiplegic migraine without status migrainosus, not intractable       ED Disposition     ED Disposition Condition Date/Time Comment    Discharge Stable Thu Mar 25, 2021  8:[facility]  Follow-up Information     Follow up With Specialties Details Why Contact Info    Boris Vidal MD Family Medicine In 2 days  [address] [place], [place] [address] [phone]            Patient's Medications   Discharge Prescriptions    No medications on file     No discharge procedures on file      PDMP Review       Value Time User    PDMP Reviewed  Yes 9/23/2019  3:19 PM Syed March MD          ED Provider  Electronically Signed by           Vivek Stauffer DO  03/25/21 2004

## 2021-07-30 ENCOUNTER — OFFICE VISIT (OUTPATIENT)
Dept: OBGYN CLINIC | Facility: CLINIC | Age: 26
End: 2021-07-30
Payer: COMMERCIAL

## 2021-07-30 VITALS
WEIGHT: 193 LBS | DIASTOLIC BLOOD PRESSURE: 81 MMHG | HEART RATE: 101 BPM | BODY MASS INDEX: 32.95 KG/M2 | HEIGHT: 64 IN | SYSTOLIC BLOOD PRESSURE: 121 MMHG

## 2021-07-30 DIAGNOSIS — M22.2X1 PATELLOFEMORAL SYNDROME, BILATERAL: ICD-10-CM

## 2021-07-30 DIAGNOSIS — S83.001D PATELLAR SUBLUXATION, RIGHT, SUBSEQUENT ENCOUNTER: Primary | ICD-10-CM

## 2021-07-30 DIAGNOSIS — M22.2X2 PATELLOFEMORAL SYNDROME, BILATERAL: ICD-10-CM

## 2021-07-30 PROCEDURE — 99212 OFFICE O/P EST SF 10 MIN: CPT | Performed by: ORTHOPAEDIC SURGERY

## 2021-07-30 NOTE — PROGRESS NOTES
25 y o female presents for evaluation of her right knee this time  She is status post right knee arthroscopy with mini open MPFL reconstruction with allograft 09/16/2019  She had been doing well since that time and did not have any more lateral patellar subluxations  Few days ago she twisted at work and felt a grind not complete subluxation but moderate pain about the knee with spontaneous realignment  She and restarted wearing her J brace was having problems with fit sweat and irritation  She has not had any other episodes since that point time  She notes that she has lost 15 lb or more this may be contributing to the fit of her brace  She notes she is still having symptoms in her left knee as well        Review of Systems  Review of systems negative unless otherwise specified in HPI    Past Medical History  Past Medical History:   Diagnosis Date    Asthma     Bipolar 1 disorder ([facility] [address] )     Depression     Disease of thyroid gland     Nerve disorder     Pet allergy     Seasonal allergies      Past Surgical History  Past Surgical History:   Procedure Laterality Date    BACK SURGERY      KNEE ARTHROSCOPY Right     KNEE ARTHROSCOPY W/ ACL RECONSTRUCTION      IA KNEE SCOPE,FULL SYNOVECT Right 9/16/2019    Procedure: ARTHROSCOPY KNEE  arthroscopy of right knee 1st, mini open right MPFL reconstruction with allograft;  Surgeon: Cesar Echavarria MD;  [facility];  Service: Orthopedics    REVISION OF SCAR Right 11/18/2019    Procedure: REVISION SCAR EXTREMITY;  Surgeon: Cesar Echavarria MD;  [facility];  Service: Orthopedics    WISDOM TOOTH EXTRACTION       Current Medications  Current Outpatient Medications on File Prior to Visit   Medication Sig Dispense Refill    albuterol (PROVENTIL HFA,VENTOLIN HFA) 90 mcg/act inhaler Inhale 2 puffs every 6 (six) hours as needed for wheezing      budesonide-formoterol (Symbicort) 80-4 5 MCG/ACT inhaler TAKE 2 PUFFS BY MOUTH TWICE A DAY      citalopram (CeleXA) 20 mg tablet Take 40 mg by mouth daily       fluticasone (FLOVENT HFA) 110 MCG/ACT inhaler Inhale 2 puffs 2 (two) times a day Rinse mouth after use   ibuprofen (MOTRIN) 800 mg tablet Take 1 tablet (800 mg total) by mouth every 8 (eight) hours for 5 days 15 tablet 0    levothyroxine 100 mcg tablet Take 100 mcg by mouth daily      norgestimate-ethinyl estradiol (ORTHO-CYCLEN) 0 25-35 MG-MCG per tablet Take 1 tablet by mouth daily      QUEtiapine (SEROquel) 200 mg tablet Take 250 mg by mouth daily at bedtime        No current facility-administered medications on file prior to visit  Recent Labs [facility])  No results found for: HCT, HGB, WBC, PT, INR, ESR, CRP, GLUCOSE, HGBA1C    Physical exam  Body mass index is 33 13 kg/m²  · General: Awake, Alert, Oriented  · Eyes: Pupils equal, round and reactive to light  · Heart: regular rate and rhythm  · Lungs: No audible wheezing  · Abdomen: soft  right Knee exam  · Positive lateral patella facet tenderness  Moderate patellofemoral crepitation  With medial pressure of the patella there is some discomfort but significantly decreases the patellofemoral crepitation  Does not have her brace with her today  There is no gross intra-articular effusion  Her portal sites are well healed  No signs of infection  No calf pain  Adequate quad strength with some hamstring weakness  No gross ligamentous laxity  Procedure  None today    Imaging  None today    1  Patellar subluxation, right, subsequent encounter    2  Patellofemoral syndrome, bilateral      Assessment:  right knee near lateral patellar subluxation    Plan:  Patient will continue in her lateral J patellar brace  Well trial stocking at underneath to aid in comfort of the brace and fit  As she did lose some weight she may need a smaller size brace  She does not have her brace with her she may stopping next week to see a MA about a different size brace    Avoid positions that cause discomfort like twisting  Otherwise activity as tolerated  Ice as needed along with Tylenol or ibuprofen  Start in physical therapy  Not improved at follow-up in 6 weeks she may need x-ray or MRI evaluation  This note was created with voice recognition software

## 2021-07-30 NOTE — PATIENT INSTRUCTIONS
Patient will continue in her lateral J patellar brace  Well trial stocking at underneath to aid in comfort of the brace and fit  As she did lose some weight she may need a smaller size brace  She does not have her brace with her she may stopping next week to see a MA about a different size brace  Avoid positions that cause discomfort like twisting  Otherwise activity as tolerated  Ice as needed along with Tylenol or ibuprofen  Start in physical therapy  Not improved at follow-up in 6 weeks she may need x-ray or MRI evaluation

## 2021-09-20 RX ORDER — MULTIVITAMIN
1 CAPSULE ORAL DAILY
COMMUNITY

## 2021-09-20 RX ORDER — ACETAMINOPHEN 500 MG
1000 TABLET ORAL EVERY 6 HOURS PRN
COMMUNITY

## 2021-09-25 ENCOUNTER — ANESTHESIA EVENT (OUTPATIENT)
Dept: PERIOP | Facility: HOSPITAL | Age: 26
End: 2021-09-25
Payer: COMMERCIAL

## 2021-09-26 NOTE — ANESTHESIA PREPROCEDURE EVALUATION
Procedure:  BEHIND EAR MASS EXCISION (Right Head)   Prior LMA size 4  Asthma on Symbicort and Flovent   Hypothyroid    Obese class II       Anesthesia Plan  ASA Score- 3     Anesthesia Type- IV sedation with anesthesia with ASA Monitors  Additional Monitors:   Airway Plan:           Plan Factors-    Chart reviewed  Existing labs reviewed  Patient summary reviewed  Patient is a current smoker           Induction-     Postoperative Plan-     Informed Consent-

## 2021-09-27 ENCOUNTER — ANESTHESIA (OUTPATIENT)
Dept: PERIOP | Facility: HOSPITAL | Age: 26
End: 2021-09-27
Payer: COMMERCIAL

## 2021-09-27 ENCOUNTER — HOSPITAL ENCOUNTER (OUTPATIENT)
Facility: HOSPITAL | Age: 26
Setting detail: OUTPATIENT SURGERY
Discharge: HOME | End: 2021-09-27
Attending: SURGERY | Admitting: SURGERY
Payer: COMMERCIAL

## 2021-09-27 VITALS
DIASTOLIC BLOOD PRESSURE: 62 MMHG | OXYGEN SATURATION: 99 % | WEIGHT: 205.6 LBS | HEART RATE: 67 BPM | SYSTOLIC BLOOD PRESSURE: 104 MMHG | RESPIRATION RATE: 18 BRPM | TEMPERATURE: 98 F | HEIGHT: 64 IN | BODY MASS INDEX: 35.1 KG/M2

## 2021-09-27 DIAGNOSIS — D17.0 BENIGN LIPOMATOUS NEOPLASM OF SKIN AND SUBCUTANEOUS TISSUE OF HEAD, FACE AND NECK: ICD-10-CM

## 2021-09-27 LAB
EXT PREGNANCY TEST URINE: NEGATIVE
EXT. CONTROL: NORMAL

## 2021-09-27 PROCEDURE — 88342 IMHCHEM/IMCYTCHM 1ST ANTB: CPT | Performed by: PATHOLOGY

## 2021-09-27 PROCEDURE — 88341 IMHCHEM/IMCYTCHM EA ADD ANTB: CPT | Performed by: PATHOLOGY

## 2021-09-27 PROCEDURE — 88365 INSITU HYBRIDIZATION (FISH): CPT | Performed by: PATHOLOGY

## 2021-09-27 PROCEDURE — 88305 TISSUE EXAM BY PATHOLOGIST: CPT | Performed by: PATHOLOGY

## 2021-09-27 PROCEDURE — 81025 URINE PREGNANCY TEST: CPT | Performed by: SURGERY

## 2021-09-27 RX ORDER — BUPIVACAINE HYDROCHLORIDE AND EPINEPHRINE 2.5; 5 MG/ML; UG/ML
INJECTION, SOLUTION INFILTRATION; PERINEURAL AS NEEDED
Status: DISCONTINUED | OUTPATIENT
Start: 2021-09-27 | End: 2021-09-27 | Stop reason: HOSPADM

## 2021-09-27 RX ORDER — MIDAZOLAM HYDROCHLORIDE 2 MG/2ML
INJECTION, SOLUTION INTRAMUSCULAR; INTRAVENOUS AS NEEDED
Status: DISCONTINUED | OUTPATIENT
Start: 2021-09-27 | End: 2021-09-27

## 2021-09-27 RX ORDER — PROPOFOL 10 MG/ML
INJECTION, EMULSION INTRAVENOUS AS NEEDED
Status: DISCONTINUED | OUTPATIENT
Start: 2021-09-27 | End: 2021-09-27

## 2021-09-27 RX ORDER — ALBUTEROL SULFATE 2.5 MG/3ML
2.5 SOLUTION RESPIRATORY (INHALATION) ONCE AS NEEDED
Status: DISCONTINUED | OUTPATIENT
Start: 2021-09-27 | End: 2021-09-27 | Stop reason: HOSPADM

## 2021-09-27 RX ORDER — PROPOFOL 10 MG/ML
INJECTION, EMULSION INTRAVENOUS CONTINUOUS PRN
Status: DISCONTINUED | OUTPATIENT
Start: 2021-09-27 | End: 2021-09-27

## 2021-09-27 RX ORDER — FENTANYL CITRATE 50 UG/ML
INJECTION, SOLUTION INTRAMUSCULAR; INTRAVENOUS AS NEEDED
Status: DISCONTINUED | OUTPATIENT
Start: 2021-09-27 | End: 2021-09-27

## 2021-09-27 RX ORDER — SODIUM CHLORIDE, SODIUM LACTATE, POTASSIUM CHLORIDE, CALCIUM CHLORIDE 600; 310; 30; 20 MG/100ML; MG/100ML; MG/100ML; MG/100ML
125 INJECTION, SOLUTION INTRAVENOUS CONTINUOUS
Status: DISCONTINUED | OUTPATIENT
Start: 2021-09-27 | End: 2021-09-28 | Stop reason: HOSPADM

## 2021-09-27 RX ORDER — SODIUM CHLORIDE, SODIUM LACTATE, POTASSIUM CHLORIDE, CALCIUM CHLORIDE 600; 310; 30; 20 MG/100ML; MG/100ML; MG/100ML; MG/100ML
INJECTION, SOLUTION INTRAVENOUS CONTINUOUS PRN
Status: DISCONTINUED | OUTPATIENT
Start: 2021-09-27 | End: 2021-09-27

## 2021-09-27 RX ORDER — FENTANYL CITRATE/PF 50 MCG/ML
25 SYRINGE (ML) INJECTION
Status: DISCONTINUED | OUTPATIENT
Start: 2021-09-27 | End: 2021-09-27 | Stop reason: HOSPADM

## 2021-09-27 RX ORDER — ONDANSETRON 2 MG/ML
4 INJECTION INTRAMUSCULAR; INTRAVENOUS ONCE AS NEEDED
Status: DISCONTINUED | OUTPATIENT
Start: 2021-09-27 | End: 2021-09-27 | Stop reason: HOSPADM

## 2021-09-27 RX ORDER — ONDANSETRON 2 MG/ML
INJECTION INTRAMUSCULAR; INTRAVENOUS AS NEEDED
Status: DISCONTINUED | OUTPATIENT
Start: 2021-09-27 | End: 2021-09-27

## 2021-09-27 RX ADMIN — PROPOFOL 75 MCG/KG/MIN: 10 INJECTION, EMULSION INTRAVENOUS at 15:13

## 2021-09-27 RX ADMIN — FENTANYL CITRATE 50 MCG: 50 INJECTION, SOLUTION INTRAMUSCULAR; INTRAVENOUS at 15:08

## 2021-09-27 RX ADMIN — FENTANYL CITRATE 50 MCG: 50 INJECTION, SOLUTION INTRAMUSCULAR; INTRAVENOUS at 15:13

## 2021-09-27 RX ADMIN — PROPOFOL 40 MG: 10 INJECTION, EMULSION INTRAVENOUS at 15:13

## 2021-09-27 RX ADMIN — ONDANSETRON 4 MG: 2 INJECTION INTRAMUSCULAR; INTRAVENOUS at 15:36

## 2021-09-27 RX ADMIN — SODIUM CHLORIDE, SODIUM LACTATE, POTASSIUM CHLORIDE, AND CALCIUM CHLORIDE: .6; .31; .03; .02 INJECTION, SOLUTION INTRAVENOUS at 15:08

## 2021-09-27 RX ADMIN — MIDAZOLAM HYDROCHLORIDE 2 MG: 1 INJECTION, SOLUTION INTRAMUSCULAR; INTRAVENOUS at 15:08

## 2021-12-27 ENCOUNTER — OFFICE VISIT (OUTPATIENT)
Dept: OBGYN CLINIC | Facility: CLINIC | Age: 26
End: 2021-12-27
Payer: COMMERCIAL

## 2021-12-27 VITALS
HEART RATE: 93 BPM | WEIGHT: 210 LBS | BODY MASS INDEX: 35.85 KG/M2 | SYSTOLIC BLOOD PRESSURE: 116 MMHG | HEIGHT: 64 IN | DIASTOLIC BLOOD PRESSURE: 81 MMHG

## 2021-12-27 DIAGNOSIS — G89.29 CHRONIC BILATERAL LOW BACK PAIN WITH RIGHT-SIDED SCIATICA: Primary | ICD-10-CM

## 2021-12-27 DIAGNOSIS — M54.41 CHRONIC BILATERAL LOW BACK PAIN WITH RIGHT-SIDED SCIATICA: Primary | ICD-10-CM

## 2021-12-27 PROCEDURE — 99213 OFFICE O/P EST LOW 20 MIN: CPT | Performed by: ORTHOPAEDIC SURGERY

## 2022-02-18 ENCOUNTER — TELEPHONE (OUTPATIENT)
Dept: OBGYN CLINIC | Facility: HOSPITAL | Age: 27
End: 2022-02-18

## 2022-02-18 ENCOUNTER — CONSULT (OUTPATIENT)
Dept: PAIN MEDICINE | Facility: CLINIC | Age: 27
End: 2022-02-18
Payer: COMMERCIAL

## 2022-02-18 VITALS
DIASTOLIC BLOOD PRESSURE: 70 MMHG | SYSTOLIC BLOOD PRESSURE: 122 MMHG | HEART RATE: 94 BPM | WEIGHT: 214 LBS | TEMPERATURE: 98.1 F | HEIGHT: 64 IN | BODY MASS INDEX: 36.54 KG/M2 | OXYGEN SATURATION: 98 %

## 2022-02-18 DIAGNOSIS — M54.41 CHRONIC BILATERAL LOW BACK PAIN WITH RIGHT-SIDED SCIATICA: Primary | ICD-10-CM

## 2022-02-18 DIAGNOSIS — M51.16 LUMBAR DISC DISEASE WITH RADICULOPATHY: ICD-10-CM

## 2022-02-18 DIAGNOSIS — Z98.890 HISTORY OF LUMBAR SURGERY: ICD-10-CM

## 2022-02-18 DIAGNOSIS — G89.4 CHRONIC PAIN SYNDROME: ICD-10-CM

## 2022-02-18 DIAGNOSIS — G89.29 CHRONIC BILATERAL LOW BACK PAIN WITH RIGHT-SIDED SCIATICA: Primary | ICD-10-CM

## 2022-02-18 PROCEDURE — 99244 OFF/OP CNSLTJ NEW/EST MOD 40: CPT | Performed by: PHYSICAL MEDICINE & REHABILITATION

## 2022-02-18 NOTE — PATIENT INSTRUCTIONS
Chronic Pain   WHAT YOU NEED TO KNOW:   Chronic pain is pain that does not get better for 3 months or longer  Chronic pain may hurt all the time, or come and go  DISCHARGE INSTRUCTIONS:   Call your local emergency number or have someone else call (911 in the [address]) if:   · You are breathing slower than normal, or you have trouble breathing  · You cannot be awakened  · You have a seizure  Call your doctor if:   · Your heart feels like it is jumping or fluttering  · You cannot think clearly  · You have side effects from prescription pain medicine, such as itching, nausea, or vomiting  · You have trouble sleeping  · Your pain gets worse, even after you take medicine  · You don't think the medicine is working  · You have questions or concerns about your condition or care  Medicines: You may need any of the following:  · Acetaminophen  decreases pain and fever  It is available without a doctor's order  Ask how much to take and how often to take it  Follow directions  Read the labels of all other medicines you are using to see if they also contain acetaminophen, or ask your doctor or pharmacist  Acetaminophen can cause liver damage if not taken correctly  Do not use more than 4 grams (4,000 milligrams) total of acetaminophen in one day  · NSAIDs , such as ibuprofen, help decrease swelling, pain, and fever  This medicine is available with or without a doctor's order  NSAIDs can cause stomach bleeding or kidney problems in certain people  If you take blood thinner medicine, always ask your healthcare provider if NSAIDs are safe for you  Always read the medicine label and follow directions  · Prescription pain medicine  called narcotics or opioids may be given for certain types of chronic pain  Ask your healthcare provider how to take this medicine safely  · Anesthetics  can be rubbed on your skin or injected into a nerve or muscle to numb an area      · Other medicines  may reduce pain, anxiety, muscle tension, or swelling  · Take your medicine as directed  Contact your healthcare provider if you think your medicine is not helping or if you have side effects  Tell him of her if you are allergic to any medicine  Keep a list of the medicines, vitamins, and herbs you take  Include the amounts, and when and why you take them  Bring the list or the pill bottles to follow-up visits  Carry your medicine list with you in case of an emergency  Manage your chronic pain:   · Apply heat  on the area in pain for 20 to 30 minutes every 2 hours for as many days as directed  Heat helps decrease pain and muscle spasms  · Apply ice  on the part of your body that hurts for 15 to 20 minutes every hour or as directed  Use an ice pack, or put crushed ice in a plastic bag  Cover it with a towel  Ice decreases pain and swelling, and helps prevent tissue damage  · Go to physical therapy as directed  A physical therapist teaches you exercises to help improve movement and strength, and to decrease pain  · Exercise for 30 minutes, 3 times a week  Regular physical activity can help decrease pain and improve your quality of life  Ask your healthcare provider about the best exercise plan for your type of pain  · Get enough sleep  Create a relaxing bedtime routine  Go to sleep and wake up at the same time every day  Avoid caffeine in the afternoon  · Talk with a counselor or therapist   A type of counseling called cognitive behavioral therapy (CBT) can help your chronic pain by changing the way you think about it  CBT can also improve your mood, sleep, and ability to move  What you must know if you take narcotic pain medicine:   · You may need to take a bowel movement softener  The most common side effect of prescription pain medicine is constipation  Bowel movement softeners are available over the counter  · Do not mix prescription pain medicines    This can cause an overdose of medicine, which can become life-threatening  Read labels  Make sure you know the ingredients in all of your medicines  · Do not drink alcohol  when you take prescription pain medicine  It is not safe to mix narcotics or opioids with alcohol or illegal drugs  · Prescription pain medicine may impair your ability to drive or work safely  They may also cause dizziness and increase your risk for falling  · Store prescription pain medicine in a safe location at home  Keep your medicine away from children and other people  Never share your medicine with anyone  Follow up with your healthcare provider as directed: You may be referred to a pain specialist  Write down your questions so you remember to ask them during your visits  © Copyright [employer] 2021 Information is for End User's use only and may not be sold, redistributed or otherwise used for commercial purposes  All illustrations and images included in CareNotes® are the copyrighted property of [employer]  or [employer] Jacob[employer]   The above information is an  only  It is not intended as medical advice for individual conditions or treatments  Talk to your doctor, nurse or pharmacist before following any medical regimen to see if it is safe and effective for you
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
## 2022-03-10 ENCOUNTER — HOSPITAL ENCOUNTER (OUTPATIENT)
Dept: RADIOLOGY | Facility: MEDICAL CENTER | Age: 27
Discharge: HOME | End: 2022-03-10
Attending: PHYSICAL MEDICINE & REHABILITATION | Admitting: PHYSICAL MEDICINE & REHABILITATION
Payer: COMMERCIAL

## 2022-03-10 VITALS
RESPIRATION RATE: 18 BRPM | OXYGEN SATURATION: 96 % | SYSTOLIC BLOOD PRESSURE: 124 MMHG | DIASTOLIC BLOOD PRESSURE: 88 MMHG | HEART RATE: 84 BPM | TEMPERATURE: 98.5 F

## 2022-03-10 DIAGNOSIS — M54.41 CHRONIC BILATERAL LOW BACK PAIN WITH RIGHT-SIDED SCIATICA: ICD-10-CM

## 2022-03-10 DIAGNOSIS — G89.4 CHRONIC PAIN SYNDROME: ICD-10-CM

## 2022-03-10 DIAGNOSIS — G89.29 CHRONIC BILATERAL LOW BACK PAIN WITH RIGHT-SIDED SCIATICA: ICD-10-CM

## 2022-03-10 DIAGNOSIS — Z98.890 HISTORY OF LUMBAR SURGERY: ICD-10-CM

## 2022-03-10 DIAGNOSIS — M51.16 LUMBAR DISC DISEASE WITH RADICULOPATHY: ICD-10-CM

## 2022-03-10 PROCEDURE — 64484 NJX AA&/STRD TFRM EPI L/S EA: CPT | Performed by: PHYSICAL MEDICINE & REHABILITATION

## 2022-03-10 PROCEDURE — 64483 NJX AA&/STRD TFRM EPI L/S 1: CPT | Performed by: PHYSICAL MEDICINE & REHABILITATION

## 2022-03-10 RX ORDER — BUPIVACAINE HCL/PF 2.5 MG/ML
10 VIAL (ML) INJECTION ONCE
Status: COMPLETED | OUTPATIENT
Start: 2022-03-10 | End: 2022-03-10

## 2022-03-10 RX ORDER — LIDOCAINE HYDROCHLORIDE 10 MG/ML
5 INJECTION, SOLUTION EPIDURAL; INFILTRATION; INTRACAUDAL; PERINEURAL ONCE
Status: COMPLETED | OUTPATIENT
Start: 2022-03-10 | End: 2022-03-10

## 2022-03-10 RX ORDER — PAPAVERINE HCL 150 MG
20 CAPSULE, EXTENDED RELEASE ORAL ONCE
Status: COMPLETED | OUTPATIENT
Start: 2022-03-10 | End: 2022-03-10

## 2022-03-10 RX ADMIN — LIDOCAINE HYDROCHLORIDE 4 ML: 10 INJECTION, SOLUTION EPIDURAL; INFILTRATION; INTRACAUDAL; PERINEURAL at 10:20

## 2022-03-10 RX ADMIN — IOHEXOL 1 ML: 300 INJECTION, SOLUTION INTRAVENOUS at 10:22

## 2022-03-10 RX ADMIN — DEXAMETHASONE SODIUM PHOSPHATE 20 MG: 10 INJECTION, SOLUTION INTRAMUSCULAR; INTRAVENOUS at 10:23

## 2022-03-10 RX ADMIN — Medication 1 ML: at 10:23

## 2022-03-10 NOTE — DISCHARGE INSTRUCTIONS
Epidural Steroid Injection   WHAT YOU NEED TO KNOW:   An epidural steroid injection (STANISLAV) is a procedure to inject steroid medicine into the epidural space  The epidural space is between your spinal cord and vertebrae  Steroids reduce inflammation and fluid buildup in your spine that may be causing pain  You may be given pain medicine along with the steroids  ACTIVITY  · Do not drive or operate machinery today  · No strenuous activity today - bending, lifting, etc   · You may resume normal activites starting tomorrow - start slowly and as tolerated  · You may shower today, but no tub baths or hot tubs  · You may have numbness for several hours from the local anesthetic  Please use caution and common sense, especially with weight-bearing activities  CARE OF THE INJECTION SITE  · If you have soreness or pain, apply ice to the area today (20 minutes on/20 minutes off)  · Starting tomorrow, you may use warm, moist heat or ice if needed  · You may have an increase or change in your discomfort for 36-48 hours after your treatment  · Apply ice and continue with any pain medication you have been prescribed  · Notify [facility] if you have any of the following: redness, drainage, swelling, headache, stiff neck or fever above 100°F     SPECIAL INSTRUCTIONS  · Our office will contact you in approximately 7 days for a progress report  MEDICATIONS  · Continue to take all routine medications  · Our office may have instructed you to hold some medications  As no general anesthesia was used in today's procedure, you should not experience any side effects related to anesthesia  If you have a problem specifically related to your procedure, please call our office at [phone]  Problems not related to your procedure should be directed to your primary care physician

## 2022-03-10 NOTE — H&P
History of Present Illness: The patient is a 32 y o  female who presents with complaints of right-sided low back and buttock pain    Patient Active Problem List   Diagnosis    Closed dislocation of right patella    Painful scar       Past Medical History:   Diagnosis Date    Arthritis     Pt reports in right knee    Asthma     Bipolar 1 disorder ([address] )     Chronic pain disorder     Chronic pain in lower back   COVID-19 03/2021    Pt reports having flu-like symptoms      Depression     Disease of thyroid gland     Lipoma     Pt reports lump behind right ear    Nerve disorder     Pet allergy     Seasonal allergies        Past Surgical History:   Procedure Laterality Date    BACK SURGERY      KNEE ARTHROSCOPY Right     KNEE ARTHROSCOPY W/ ACL RECONSTRUCTION      ORTHOPEDIC SURGERY      IN EXCISION TUMOR SOFT TISS FACE/SCALP SUBQ 2+CM Right 9/27/2021    Procedure: BEHIND EAR MASS EXCISION;  Surgeon: Yenni Hickey DO;  Location:  MAIN OR;  Service: General    IN KNEE SCOPE,FULL SYNOVECT Right 9/16/2019    Procedure: ARTHROSCOPY KNEE  arthroscopy of right knee 1st, mini open right MPFL reconstruction with allograft;  Surgeon: Berto Ta MD;  [facility];  Service: Orthopedics    REVISION OF SCAR Right 11/18/2019    Procedure: REVISION SCAR EXTREMITY;  Surgeon: Berto Ta MD;  [facility];  Service: Orthopedics    WISDOM TOOTH EXTRACTION           Current Outpatient Medications:     acetaminophen (TYLENOL) 500 mg tablet, Take 1,000 mg by mouth every 6 (six) hours as needed for mild pain, Disp: , Rfl:     albuterol (PROVENTIL HFA,VENTOLIN HFA) 90 mcg/act inhaler, Inhale 2 puffs every 6 (six) hours as needed for wheezing, Disp: , Rfl:     budesonide-formoterol (Symbicort) 80-4 5 MCG/ACT inhaler, Inhale 2 puffs daily , Disp: , Rfl:     citalopram (CeleXA) 20 mg tablet, Take 40 mg by mouth daily , Disp: , Rfl:     fluticasone (FLOVENT HFA) 110 MCG/ACT inhaler, Inhale 2 no change puffs as needed Rinse mouth after use  , Disp: , Rfl:     ibuprofen (MOTRIN) 800 mg tablet, Take 1 tablet (800 mg total) by mouth every 8 (eight) hours for 5 days, Disp: 15 tablet, Rfl: 0    levothyroxine 100 mcg tablet, Take 100 mcg by mouth daily, Disp: , Rfl:     Multiple Vitamin (multivitamin) capsule, Take 1 capsule by mouth daily, Disp: , Rfl:     norgestimate-ethinyl estradiol (ORTHO-CYCLEN) 0 25-35 MG-MCG per tablet, Take 1 tablet by mouth daily, Disp: , Rfl:     QUEtiapine (SEROquel) 200 mg tablet, Take 250 mg by mouth daily at bedtime , Disp: , Rfl:     Current Facility-Administered Medications:     bupivacaine (PF) (MARCAINE) 0 25 % injection 10 mL, 10 mL, Epidural, Once, Veronica Maradiaga DO    dexamethasone (PF) (DECADRON) injection 20 mg, 20 mg, Epidural, Once, Veronica Maradiaga,     iohexol (OMNIPAQUE) 300 mg/mL injection 50 mL, 50 mL, Epidural, Once, Veronica Maradiaga DO    lidocaine (PF) (XYLOCAINE-MPF) 1 % injection 5 mL, 5 mL, Infiltration, Once, Veronica Maradiaga,     Allergies   Allergen Reactions    Escitalopram Fatigue     Flu like symptoms and increased depression     Fluoxetine GI Intolerance     GI upset        Physical Exam: There were no vitals filed for this visit  General: Awake, Alert, Oriented x 3, Mood and affect appropriate  Respiratory: Respirations even and unlabored  Cardiovascular: Peripheral pulses intact; no edema  Musculoskeletal Exam:  Tenderness to palpation right-sided lumbar paraspinals and distal to PSIS    ASA Score: 2    Patient/Chart Verification  Patient ID Verified: Verbal  ID Band Applied: No  Consents Confirmed: Procedural,To be obtained in the Pre-Procedure area  H&P( within 30 days) Verified: To be obtained in the Pre-Procedure area  Interval H&P(within 24 hr) Complete (required for Outpatients and Surgery Admit only): To be obtained in the Pre-Procedure area  Allergies Reviewed:  Yes  Anticoag/NSAID held?: NA  Currently on antibiotics?: No  Pregnancy denied?: Yes    Assessment:   1  Chronic bilateral low back pain with right-sided sciatica    2  History of lumbar surgery    3  Lumbar disc disease with radiculopathy    4   Chronic pain syndrome        Plan: Right L5-S1 and S1 TFESI

## 2022-03-17 ENCOUNTER — TELEPHONE (OUTPATIENT)
Dept: PAIN MEDICINE | Facility: CLINIC | Age: 27
End: 2022-03-17

## 2022-03-18 ENCOUNTER — APPOINTMENT (OUTPATIENT)
Dept: LAB | Facility: HOSPITAL | Age: 27
End: 2022-03-18
Payer: COMMERCIAL

## 2022-03-18 DIAGNOSIS — Z79.899 ENCOUNTER FOR LONG-TERM (CURRENT) USE OF OTHER MEDICATIONS: ICD-10-CM

## 2022-03-18 LAB
ALBUMIN SERPL BCP-MCNC: 3.6 G/DL (ref 3.5–5)
ALP SERPL-CCNC: 72 U/L (ref 46–116)
ALT SERPL W P-5'-P-CCNC: 26 U/L (ref 12–78)
ANION GAP SERPL CALCULATED.3IONS-SCNC: 8 MMOL/L (ref 4–13)
AST SERPL W P-5'-P-CCNC: 12 U/L (ref 5–45)
BASOPHILS # BLD AUTO: 0.04 THOUSANDS/ΜL (ref 0–0.1)
BASOPHILS NFR BLD AUTO: 0 % (ref 0–1)
BILIRUB SERPL-MCNC: 0.22 MG/DL (ref 0.2–1)
BUN SERPL-MCNC: 14 MG/DL (ref 5–25)
CALCIUM SERPL-MCNC: 8.6 MG/DL (ref 8.3–10.1)
CHLORIDE SERPL-SCNC: 103 MMOL/L (ref 100–108)
CHOLEST SERPL-MCNC: 173 MG/DL
CO2 SERPL-SCNC: 27 MMOL/L (ref 21–32)
CREAT SERPL-MCNC: 0.83 MG/DL (ref 0.6–1.3)
EOSINOPHIL # BLD AUTO: 0.32 THOUSAND/ΜL (ref 0–0.61)
EOSINOPHIL NFR BLD AUTO: 3 % (ref 0–6)
ERYTHROCYTE [DISTWIDTH] IN BLOOD BY AUTOMATED COUNT: 13.2 % (ref 11.6–15.1)
GFR SERPL CREATININE-BSD FRML MDRD: 97 ML/MIN/1.73SQ M
GLUCOSE P FAST SERPL-MCNC: 109 MG/DL (ref 65–99)
HCT VFR BLD AUTO: 41.9 % (ref 34.8–46.1)
HDLC SERPL-MCNC: 62 MG/DL
HGB BLD-MCNC: 13.5 G/DL (ref 11.5–15.4)
IMM GRANULOCYTES # BLD AUTO: 0.04 THOUSAND/UL (ref 0–0.2)
IMM GRANULOCYTES NFR BLD AUTO: 0 % (ref 0–2)
LDLC SERPL CALC-MCNC: 91 MG/DL (ref 0–100)
LYMPHOCYTES # BLD AUTO: 3.3 THOUSANDS/ΜL (ref 0.6–4.47)
LYMPHOCYTES NFR BLD AUTO: 31 % (ref 14–44)
MCH RBC QN AUTO: 30.4 PG (ref 26.8–34.3)
MCHC RBC AUTO-ENTMCNC: 32.2 G/DL (ref 31.4–37.4)
MCV RBC AUTO: 94 FL (ref 82–98)
MONOCYTES # BLD AUTO: 0.54 THOUSAND/ΜL (ref 0.17–1.22)
MONOCYTES NFR BLD AUTO: 5 % (ref 4–12)
NEUTROPHILS # BLD AUTO: 6.55 THOUSANDS/ΜL (ref 1.85–7.62)
NEUTS SEG NFR BLD AUTO: 61 % (ref 43–75)
NONHDLC SERPL-MCNC: 111 MG/DL
NRBC BLD AUTO-RTO: 0 /100 WBCS
PLATELET # BLD AUTO: 262 THOUSANDS/UL (ref 149–390)
PMV BLD AUTO: 10.2 FL (ref 8.9–12.7)
POTASSIUM SERPL-SCNC: 4 MMOL/L (ref 3.5–5.3)
PROT SERPL-MCNC: 7.6 G/DL (ref 6.4–8.2)
RBC # BLD AUTO: 4.44 MILLION/UL (ref 3.81–5.12)
SODIUM SERPL-SCNC: 138 MMOL/L (ref 136–145)
TRIGL SERPL-MCNC: 99 MG/DL
TSH SERPL DL<=0.05 MIU/L-ACNC: 14.2 UIU/ML (ref 0.36–3.74)
WBC # BLD AUTO: 10.79 THOUSAND/UL (ref 4.31–10.16)

## 2022-03-18 PROCEDURE — 80061 LIPID PANEL: CPT

## 2022-03-18 PROCEDURE — 85025 COMPLETE CBC W/AUTO DIFF WBC: CPT

## 2022-03-18 PROCEDURE — 80053 COMPREHEN METABOLIC PANEL: CPT

## 2022-03-18 PROCEDURE — 84443 ASSAY THYROID STIM HORMONE: CPT

## 2022-03-18 PROCEDURE — 36415 COLL VENOUS BLD VENIPUNCTURE: CPT

## 2022-04-14 ENCOUNTER — OFFICE VISIT (OUTPATIENT)
Dept: PAIN MEDICINE | Facility: CLINIC | Age: 27
End: 2022-04-14
Payer: COMMERCIAL

## 2022-04-14 VITALS
TEMPERATURE: 98.3 F | HEIGHT: 64 IN | HEART RATE: 103 BPM | WEIGHT: 221 LBS | OXYGEN SATURATION: 97 % | BODY MASS INDEX: 37.73 KG/M2 | DIASTOLIC BLOOD PRESSURE: 76 MMHG | SYSTOLIC BLOOD PRESSURE: 117 MMHG

## 2022-04-14 DIAGNOSIS — M51.26 LUMBAR DISC HERNIATION: ICD-10-CM

## 2022-04-14 DIAGNOSIS — M51.16 LUMBAR DISC DISEASE WITH RADICULOPATHY: ICD-10-CM

## 2022-04-14 DIAGNOSIS — G89.4 CHRONIC PAIN SYNDROME: Primary | ICD-10-CM

## 2022-04-14 PROCEDURE — 99214 OFFICE O/P EST MOD 30 MIN: CPT | Performed by: NURSE PRACTITIONER

## 2022-04-14 RX ORDER — DICLOFENAC SODIUM 75 MG/1
75 TABLET, DELAYED RELEASE ORAL 2 TIMES DAILY
Qty: 60 TABLET | Refills: 1 | Status: SHIPPED | OUTPATIENT
Start: 2022-04-14

## 2022-04-14 NOTE — PATIENT INSTRUCTIONS
Diclofenac (By mouth)   Diclofenac (dye-KLOE-fen-ak)  Treats pain  Also treats migraines  This medicine is an NSAID  Brand Name(s): Cambia, Cataflam, Alexandria Kenefic, Zorvolex   There may be other brand names for this medicine  When This Medicine Should Not Be Used: This medicine is not right for everyone  Do not use it if you had an allergic reaction to diclofenac, aspirin, another NSAID, or bovine protein  Do not use it right before or after a heart surgery called coronary artery bypass graft (CABG)  How to Use This Medicine:   Capsule, Liquid, Tablet, Coated Tablet, Long Acting Tablet  · Your doctor will tell you how much medicine to use  Do not use more than directed  · This medicine should come with a Medication Guide  Ask your pharmacist for a copy if you do not have one  · Oral solution: Mix the packet contents with 1 to 2 ounces (30 to 60 mL) of water  Do not use any liquid other than water for mixing the medicine  Mix well and drink it immediately on an empty stomach  · Missed dose: Take a dose as soon as you remember  If it is almost time for your next dose, wait until then and take a regular dose  Do not take extra medicine to make up for a missed dose  · Store the medicine in a closed container at room temperature, away from heat, moisture, and direct light     Drugs and Foods to Avoid:   Ask your doctor or pharmacist before using any other medicine, including over-the-counter medicines, vitamins, and herbal products  · Do not use any other NSAID (including aspirin, diflunisal, ibuprofen, naproxen, or salsalate) unless your doctor says it is okay  · Some medicines and foods can affect how diclofenac works  Tell your doctor if you are also using any of the following:  ? Cyclosporine, digoxin, lithium, methotrexate, pemetrexed, rifampin, voriconazole  ? Blood pressure medicine (including ACE inhibitors, ARBs, beta blockers)  ? Blood thinner (including warfarin)  ?  Diuretic (water pill)  ? Medicine to treat depression (including SNRIs, SSRIs)  ? Steroid medicine  · Do not drink alcohol while you are using this medicine  Warnings While Using This Medicine:   · Tell your doctor if you are pregnant or breastfeeding  Do not use this medicine during the later part of a pregnancy, unless your doctor tells you to  · Tell your doctor if you have kidney disease, liver disease, asthma, heart failure, high blood pressure, or heart or blood vessel problems, or a history of stomach ulcers or bleeding problems  Tell your doctor if you have phenylketonuria (PKU)  Also tell your doctor if you drink alcohol  · This medicine may cause the following problems:  ? Increased risk of blood clots, heart attack, stroke, or heart failure  ? Bleeding problems, including stomach or bowel bleeding or ulcer  ? Liver problems  ? High blood pressure  ? Kidney problems  ? Serious skin reactions, including Romero-Ari syndrome, exfoliative dermatitis, toxic epidermal necrolysis, and drug reaction with eosinophilia and systemic symptoms (DRESS)  · This medicine may cause a delay in ovulation for women and may affect their ability to have children  If you plan to have children, talk with your doctor before using this medicine  · Tell any doctor or dentist who treats you that you are using this medicine  · Your headaches may become worse if you use a headache medicine for 10 or more days per month  Write down how often your headaches occur and how often you use this medicine  · Your doctor will do lab tests at regular visits to check on the effects of this medicine  Keep all appointments  · Keep all medicine out of the reach of children  Never share your medicine with anyone    Possible Side Effects While Using This Medicine:   Call your doctor right away if you notice any of these side effects:  · Allergic reaction: Itching or hives, swelling in your face or hands, swelling or tingling in your mouth or throat, chest tightness, trouble breathing  · Blistering, peeling, or red skin rash  · Bloody or black, tarry stools, severe stomach pain, vomiting blood or material that looks like coffee grounds  · Change in how much or how often you urinate  · Chest pain that may spread to your arms, jaw, back, or neck, trouble breathing, unusual sweating, faintness  · Confusion, uneven heartbeat, trouble breathing, numbness or tingling in your hands, feet, or lips  · Dark urine or pale stools, nausea, vomiting, loss of appetite, stomach pain, yellow skin or eyes  · Numbness or weakness in your arm or leg, or on one side of your body, pain in your lower leg, sudden or severe headache, or problems with vision, speech, or walking  · Rapid weight gain, swelling in your hands, ankles, or feet  · Unusual bleeding, bruising, or weakness  If you notice these less serious side effects, talk with your doctor:   · Constipation, diarrhea, stomach upset, passing gas  · Mild headache, dizziness, drowsiness  · Runny or stuffy nose, sneezing  If you notice other side effects that you think are caused by this medicine, tell your doctor  Call your doctor for medical advice about side effects  You may report side effects to FDA at 4-458-FDA-5477    © Copyright [employer] 2022 Information is for End User's use only and may not be sold, redistributed or otherwise used for commercial purposes  The above information is an  only  It is not intended as medical advice for individual conditions or treatments  Talk to your doctor, nurse or pharmacist before following any medical regimen to see if it is safe and effective for you

## 2022-04-14 NOTE — PROGRESS NOTES
Assessment  1  Chronic pain syndrome    2  Lumbar disc herniation    3  Lumbar disc disease with radiculopathy        Plan  The patient was seen in the office for follow-up after her right L5-S1, S1 transforaminal epidural steroid injection on 03/10/2022  She tells me that this helped her pain symptoms by  50% for about 2 weeks and then her pain symptoms returned  During her consultation on 02/18/2022 Dr Kathy Quiroga discussed with her possibility of a spinal cord stimulator trial   The patient tells me that before moving forward with this, she would like a referral to a neurosurgeon to see if her disc herniation can be repaired surgically  She has had a successful surgery on a herniated disc in the past and feels like this is a better option for her right now  I did provide her with a referral to Neurosurgery and I started her on diclofenac 75 mg twice a day to be taken with food  Side effects were reviewed with the patient and she verbalized understanding and prescription was sent to the pharmacy on file  She will contact the office after her consult with Neurosurgery if she decides to move forward with a spinal cord stimulator  My impressions and treatment recommendations were discussed in detail with the patient who verbalized understanding and had no further questions  Discharge instructions were provided  I personally saw and examined the patient and I agree with the above discussed plan of care      Orders Placed This Encounter   Procedures    Ambulatory referral to Neurosurgery     Standing Status:   Future     Standing Expiration Date:   4/14/2023     Referral Priority:   Routine     Referral Type:   Consult - AMB     Referral Reason:   Specialty Services Required     Requested Specialty:   Neurosurgery     Number of Visits Requested:   1     Expiration Date:   4/14/2023     New Medications Ordered This Visit   Medications    diclofenac (VOLTAREN) 75 mg EC tablet     Sig: Take 1 tablet (75 mg total) by mouth 2 (two) times a day Take with food     Dispense:  60 tablet     Refill:  1       History of Present Illness    Shima Parson is a 32 y o  female who presents to the office with a pain score of 5/10 that is constant  She describes the quality of her pain as burning, dull aching, pressure-like, shooting with numbness and pins and needles  The pain is in her low back and radiates out both sides into her hips and down both of her legs in the sides and the back  I have personally reviewed and/or updated the patient's past medical history, past surgical history, family history, social history, current medications, allergies, and vital signs today  Review of Systems   Constitutional: Negative for fever and unexpected weight change  HENT: Negative for trouble swallowing  Eyes: Negative for visual disturbance  Respiratory: Negative for shortness of breath and wheezing  Cardiovascular: Negative for chest pain and palpitations  Gastrointestinal: Negative for constipation, diarrhea and nausea  Endocrine: Negative for cold intolerance, heat intolerance and polydipsia  Genitourinary: Negative for difficulty urinating and frequency  Musculoskeletal: Positive for gait problem, joint swelling and myalgias  Negative for arthralgias  Skin: Negative for rash  Neurological: Negative for dizziness, seizures, weakness and headaches  Hematological: Does not bruise/bleed easily  Psychiatric/Behavioral: Negative for dysphoric mood  Patient Active Problem List   Diagnosis    Closed dislocation of right patella    Painful scar    Chronic bilateral low back pain with right-sided sciatica    Lumbar disc disease with radiculopathy    History of lumbar surgery    Chronic pain syndrome       Past Medical History:   Diagnosis Date    Arthritis     Pt reports in right knee    Asthma     Bipolar 1 disorder ([facility] [address] )     Chronic pain disorder     Chronic pain in lower back      COVID-19 03/2021 Pt reports having flu-like symptoms      Depression     Disease of thyroid gland     Lipoma     Pt reports lump behind right ear    Nerve disorder     Pet allergy     Seasonal allergies        Past Surgical History:   Procedure Laterality Date    BACK SURGERY      KNEE ARTHROSCOPY Right     KNEE ARTHROSCOPY W/ ACL RECONSTRUCTION      ORTHOPEDIC SURGERY      W[place] EXCISION TUMOR SOFT TISS FACE/SCALP SUBQ 2+CM Right 9/27/2021    Procedure: BEHIND EAR MASS EXCISION;  Surgeon: Deion Larsen DO;  [facility];  Service: General    WY KNEE SCOPE,FULL SYNOVECT Right 9/16/2019    Procedure: ARTHROSCOPY KNEE  arthroscopy of right knee 1st, mini open right MPFL reconstruction with allograft;  Surgeon: Patti Ferreira MD;  Location: [facility];  Service: Orthopedics    REVISION OF SCAR Right 11/18/2019    Procedure: REVISION SCAR EXTREMITY;  Surgeon: Patti Ferreira MD;  [facility]: [facility];  Service: Orthopedics    WISDOM TOOTH EXTRACTION         Family History   Problem Relation Age of Onset    Pneumonia Mother     Multiple sclerosis Father        Social History     Occupational History    Not on file   Tobacco Use    Smoking status: Current Every Day Smoker     Types: E-Cigarettes    Smokeless tobacco: Current User    Tobacco comment: vapes   Vaping Use    Vaping Use: Every day    Substances: Nicotine, THC, Flavoring   Substance and Sexual Activity    Alcohol use: Not Currently    Drug use: Yes     Types: Marijuana     Comment: Pt uses daily for pain management    Sexual activity: Not on file     Comment: did not ask       Current Outpatient Medications on File Prior to Visit   Medication Sig    acetaminophen (TYLENOL) 500 mg tablet Take 1,000 mg by mouth every 6 (six) hours as needed for mild pain    albuterol (PROVENTIL HFA,VENTOLIN HFA) 90 mcg/act inhaler Inhale 2 puffs every 6 (six) hours as needed for wheezing    budesonide-formoterol (Symbicort) 80-4 5 MCG/ACT inhaler Inhale 2 puffs daily     citalopram (CeleXA) 20 mg tablet Take 40 mg by mouth daily     fluticasone (FLOVENT HFA) 110 MCG/ACT inhaler Inhale 2 puffs as needed Rinse mouth after use   levothyroxine 100 mcg tablet Take 100 mcg by mouth daily    Multiple Vitamin (multivitamin) capsule Take 1 capsule by mouth daily    norgestimate-ethinyl estradiol (ORTHO-CYCLEN) 0 25-35 MG-MCG per tablet Take 1 tablet by mouth daily    QUEtiapine (SEROquel) 200 mg tablet Take 250 mg by mouth daily at bedtime     [DISCONTINUED] ibuprofen (MOTRIN) 800 mg tablet Take 1 tablet (800 mg total) by mouth every 8 (eight) hours for 5 days     No current facility-administered medications on file prior to visit         Allergies   Allergen Reactions    Escitalopram Fatigue     Flu like symptoms and increased depression     Fluoxetine GI Intolerance     GI upset        Physical Exam    /76   Pulse 103   Temp 98 3 °F (36 8 °C)   Ht 5' 4" (1 626 m)   Wt 100 kg (221 lb)   SpO2 97%   BMI 37 93 kg/m²     Constitutional: obese  Eyes: anicteric  HEENT: grossly intact  Neck: supple, symmetric, trachea midline and no masses   Pulmonary:even and unlabored  Cardiovascular:No edema or pitting edema present  Skin:Normal without rashes or lesions and well hydrated  Psychiatric:Mood and affect appropriate  Neurologic:Cranial Nerves II-XII grossly intact  Musculoskeletal:antalgic    Imaging

## 2022-05-27 ENCOUNTER — OFFICE VISIT (OUTPATIENT)
Dept: NEUROSURGERY | Facility: CLINIC | Age: 27
End: 2022-05-27
Payer: COMMERCIAL

## 2022-05-27 VITALS
HEART RATE: 96 BPM | DIASTOLIC BLOOD PRESSURE: 74 MMHG | WEIGHT: 220 LBS | RESPIRATION RATE: 16 BRPM | HEIGHT: 64 IN | TEMPERATURE: 98.7 F | SYSTOLIC BLOOD PRESSURE: 118 MMHG | BODY MASS INDEX: 37.56 KG/M2

## 2022-05-27 DIAGNOSIS — M96.1 POST LAMINECTOMY SYNDROME: ICD-10-CM

## 2022-05-27 DIAGNOSIS — Z98.890 STATUS POST LUMBAR DISCECTOMY: ICD-10-CM

## 2022-05-27 DIAGNOSIS — M51.16 LUMBAR DISC DISEASE WITH RADICULOPATHY: ICD-10-CM

## 2022-05-27 DIAGNOSIS — M51.26 LUMBAR DISC HERNIATION: ICD-10-CM

## 2022-05-27 DIAGNOSIS — G89.4 CHRONIC PAIN SYNDROME: Primary | ICD-10-CM

## 2022-05-27 PROCEDURE — 99203 OFFICE O/P NEW LOW 30 MIN: CPT | Performed by: NURSE PRACTITIONER
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
## 2022-05-27 NOTE — PATIENT INSTRUCTIONS
If you have additional questions or concerns do not hesitate to call the Neurosurgery office  If you developed difficulty urinating, dribbling urine, loss of bowel control, numbness in your peritoneal area, loss of motor function in your legs or your feet, balance problems unsteady gait go immediately to your closest emergency room

## 2022-05-29 NOTE — ASSESSMENT & PLAN NOTE
· As addressed in HPI  · No gait instability, urinary retention or dribbling of urine, bowel incontinence, saddle anesthesia, weakness in legs or feet  · She denies persistent leg numbness or weakness  Imaging  · MRI of the lumbar spine11/24/2021 L5-S1 loss of disc height progresses since 2020, marrow degeneration, laminectomy defect status post L5-S1 in 2019  L5-S1 Central and right-sided disc herniation with disc material compressing the right S1 spinal nerve root  Left laminectomy defect at L5-S1  · Collaborative review with Dr Flavio Butt - at this time, recommend no acute neurosurgical intervention especially with h/o prior surgery at the same level (now with copious scar tissue and abnormal bony anatomy)  Plan  · Continue to optimize conservative management with PT and Pain Medicine  · RTO as needed  · Referral to physical therapy  · Referral to [facility] for medical weight management     · Advise if you developed problems urinating/dribbling urine loss of bowel control, weakness in legs with clumsy gait walking difficulty numbness and anesthesia sensation in perineal area report to your closest emergency department,  · If you have additional questions or concerns do not hesitate to call the Neurosurgery Department    ·

## 2022-08-01 ENCOUNTER — APPOINTMENT (OUTPATIENT)
Dept: LAB | Facility: CLINIC | Age: 27
End: 2022-08-01

## 2022-08-01 ENCOUNTER — APPOINTMENT (OUTPATIENT)
Dept: URGENT CARE | Facility: CLINIC | Age: 27
End: 2022-08-01

## 2022-08-01 DIAGNOSIS — Z02.1 PHYSICAL EXAM, PRE-EMPLOYMENT: ICD-10-CM

## 2022-08-01 LAB — RUBV IGG SERPL IA-ACNC: 104.4 IU/ML

## 2022-08-01 PROCEDURE — 86787 VARICELLA-ZOSTER ANTIBODY: CPT

## 2022-08-01 PROCEDURE — 86480 TB TEST CELL IMMUN MEASURE: CPT

## 2022-08-01 PROCEDURE — 86735 MUMPS ANTIBODY: CPT

## 2022-08-01 PROCEDURE — 86765 RUBEOLA ANTIBODY: CPT

## 2022-08-01 PROCEDURE — 86762 RUBELLA ANTIBODY: CPT

## 2022-08-01 PROCEDURE — 36415 COLL VENOUS BLD VENIPUNCTURE: CPT

## 2022-08-02 LAB
MEV IGG SER QL IA: NORMAL
MUV IGG SER QL IA: NORMAL
VZV IGG SER QL IA: NORMAL

## 2022-08-03 LAB
GAMMA INTERFERON BACKGROUND BLD IA-ACNC: 0.04 IU/ML
M TB IFN-G BLD-IMP: NEGATIVE
M TB IFN-G CD4+ BCKGRND COR BLD-ACNC: 0.05 IU/ML
M TB IFN-G CD4+ BCKGRND COR BLD-ACNC: 0.1 IU/ML
MITOGEN IGNF BCKGRD COR BLD-ACNC: >10 IU/ML

## 2022-09-08 ENCOUNTER — HOSPITAL ENCOUNTER (OUTPATIENT)
Dept: RADIOLOGY | Facility: HOSPITAL | Age: 27
End: 2022-09-08
Payer: COMMERCIAL

## 2022-09-08 DIAGNOSIS — M54.2 NECK PAIN: ICD-10-CM

## 2022-09-08 PROCEDURE — 72040 X-RAY EXAM NECK SPINE 2-3 VW: CPT

## 2023-04-07 ENCOUNTER — OFFICE VISIT (OUTPATIENT)
Dept: URGENT CARE | Facility: CLINIC | Age: 28
End: 2023-04-07

## 2023-04-07 VITALS
HEIGHT: 64 IN | OXYGEN SATURATION: 93 % | TEMPERATURE: 97.8 F | WEIGHT: 215 LBS | HEART RATE: 96 BPM | BODY MASS INDEX: 36.7 KG/M2 | DIASTOLIC BLOOD PRESSURE: 86 MMHG | RESPIRATION RATE: 19 BRPM | SYSTOLIC BLOOD PRESSURE: 135 MMHG

## 2023-04-07 DIAGNOSIS — B34.9 VIRAL SYNDROME: Primary | ICD-10-CM

## 2023-04-07 NOTE — LETTER
April 8, 2023     Patient: Jean Rogers   YOB: 1995   Date of Visit: 4/7/2023       To Whom It May Concern:    Arminda Rene was seen in my clinic on 4/7/2023  She may return to work on 4/9/23          Sincerely,         Manisha Olivas PA-C        CC: No Recipients

## 2023-04-07 NOTE — PATIENT INSTRUCTIONS
Continue with supportive measures, OTC Tylenol/Ibuprofen, increased fluid intake and rest   Advance diet as tolerated but start with bland foods  Good hand hygiene  Follow up with PCP in 3-5 days  Present to ER if symptoms worsen     Viral Syndrome   AMBULATORY CARE:   Viral syndrome  is a term used for symptoms of an infection caused by a virus  Viruses are spread easily from person to person on shared items  Signs and symptoms  may start slowly or suddenly and last hours to days  They can be mild to severe and can change over days or hours  You may have any of the following:  Fever and chills    A runny or stuffy nose    Cough, sore throat, or hoarseness    Headache, or pain and pressure around your eyes    Muscle aches and joint pain    Shortness of breath or wheezing    Abdominal pain, cramps, and diarrhea    Nausea, vomiting, or loss of appetite    Call your local emergency number (911 in the [address] [place] [address],[address]), or have someone call if:   You have a seizure  You cannot be woken  You have chest pain or trouble breathing  Seek care immediately if:   You have a stiff neck, a bad headache, and sensitivity to light  You feel weak, dizzy, or confused  You stop urinating or urinate a lot less than usual     You cough up blood or thick yellow or green mucus  You have severe abdominal pain or your abdomen is larger than usual     Call your doctor if:   Your symptoms do not get better with treatment or get worse after 3 days  You have a rash or ear pain  You have burning when you urinate  You have questions or concerns about your condition or care  Treatment for viral syndrome  may include medicines to manage your symptoms  Antibiotics are not given for a viral infection  You may  need any of the following:  Acetaminophen  decreases pain and fever  It is available without a doctor's order  Ask how much to take and how often to take it  Follow directions   Read the labels of all other medicines you are using to see if they also contain acetaminophen, or ask your doctor or pharmacist  Acetaminophen can cause liver damage if not taken correctly  NSAIDs , such as ibuprofen, help decrease swelling, pain, and fever  NSAIDs can cause stomach bleeding or kidney problems in certain people  If you take blood thinner medicine, always ask your healthcare provider if NSAIDs are safe for you  Always read the medicine label and follow directions  Cold medicine  helps decrease swelling, control a cough, and relieve chest or nasal congestion  Saline nasal spray  helps decrease nasal congestion  Manage your symptoms:   Drink liquids as directed to prevent dehydration  Ask how much liquid to drink each day and which liquids are best for you  Do not drink liquids with caffeine  Caffeine can make dehydration worse  Get plenty of rest to help your body heal   Take naps throughout the day  Ask your healthcare provider when you can return to work and your normal activities  Use a cool mist humidifier  to increase air moisture in your home  This may make it easier for you to breathe and help decrease your cough  Drink tea with honey or use cough drops for a sore throat  Cough drops are available without a doctor's order  Follow directions for taking cough drops  Do not smoke or be close to anyone who is smoking  Nicotine and other chemicals in cigarettes and cigars can cause lung damage  Smoking can also delay healing  Ask your healthcare provider for information if you currently smoke and need help to quit  E-cigarettes or smokeless tobacco still contain nicotine  Talk to your healthcare provider before you use these products  Prevent the spread of germs:   Wash your hands often throughout the day  Use soap and water  Rub your soapy hands together, lacing your fingers, for at least 20 seconds  Rinse with warm, running water  Dry your hands with a clean towel or paper towel   Use hand  that contains alcohol if soap and water are not available  Teach children how to wash their hands and use hand   Cover sneezes and coughs  Turn your face away and cover your mouth and nose with a tissue  Throw the tissue away  Use the bend of your arm if a tissue is not available  Then wash your hands well with soap and water or use hand   Teach children how to cover a cough or sneeze  Stay home while you are sick  Avoid crowds as much as possible  Get the influenza (flu) vaccine as soon as recommended each year  The flu vaccine is available starting in September or October  Ask your healthcare provider about the pneumonia vaccine  This vaccine is usually recommended every 5 years in older adults  Follow up with your doctor as directed:  Write down your questions so you remember to ask them during your visits  © Copyright [employer] 2022 Information is for End User's use only and may not be sold, redistributed or otherwise used for commercial purposes  The above information is an  only  It is not intended as medical advice for individual conditions or treatments  Talk to your doctor, nurse or pharmacist before following any medical regimen to see if it is safe and effective for you

## 2023-04-07 NOTE — PROGRESS NOTES
[facility]        NAME: Amol Herbert is a 32 y o  female  : 1995    MRN: [id number]  DATE: 2023  TIME: 4:34 PM    Assessment and Plan   Viral syndrome [B34 9]  1  Viral syndrome          Symptoms likely related to viral etiology and encouraged continued supportive measures  Increased fluid intake and rest  Advance diet as tolerated  Follow up with PCP in 3-5 days or proceed to emergency department for worsening symptoms  Patient verbalized understanding of instructions given  Patient Instructions     Patient Instructions     Continue with supportive measures, OTC Tylenol/Ibuprofen, increased fluid intake and rest   Advance diet as tolerated but start with bland foods  Good hand hygiene  Follow up with PCP in 3-5 days  Present to ER if symptoms worsen     Viral Syndrome   AMBULATORY CARE:   Viral syndrome  is a term used for symptoms of an infection caused by a virus  Viruses are spread easily from person to person on shared items  Signs and symptoms  may start slowly or suddenly and last hours to days  They can be mild to severe and can change over days or hours  You may have any of the following:  • Fever and chills    • A runny or stuffy nose    • Cough, sore throat, or hoarseness    • Headache, or pain and pressure around your eyes    • Muscle aches and joint pain    • Shortness of breath or wheezing    • Abdominal pain, cramps, and diarrhea    • Nausea, vomiting, or loss of appetite    Call your local emergency number (911 in the [place]), or have someone call if:   • You have a seizure  • You cannot be woken  • You have chest pain or trouble breathing  Seek care immediately if:   • You have a stiff neck, a bad headache, and sensitivity to light  • You feel weak, dizzy, or confused  • You stop urinating or urinate a lot less than usual     • You cough up blood or thick yellow or green mucus      • You have severe abdominal pain or your abdomen is larger than usual     Call your doctor if:   • Your symptoms do not get better with treatment or get worse after 3 days  • You have a rash or ear pain  • You have burning when you urinate  • You have questions or concerns about your condition or care  Treatment for viral syndrome  may include medicines to manage your symptoms  Antibiotics are not given for a viral infection  You may  need any of the following:  • Acetaminophen  decreases pain and fever  It is available without a doctor's order  Ask how much to take and how often to take it  Follow directions  Read the labels of all other medicines you are using to see if they also contain acetaminophen, or ask your doctor or pharmacist  Acetaminophen can cause liver damage if not taken correctly  • NSAIDs , such as ibuprofen, help decrease swelling, pain, and fever  NSAIDs can cause stomach bleeding or kidney problems in certain people  If you take blood thinner medicine, always ask your healthcare provider if NSAIDs are safe for you  Always read the medicine label and follow directions  • Cold medicine  helps decrease swelling, control a cough, and relieve chest or nasal congestion  • Saline nasal spray  helps decrease nasal congestion  Manage your symptoms:   • Drink liquids as directed to prevent dehydration  Ask how much liquid to drink each day and which liquids are best for you  Do not drink liquids with caffeine  Caffeine can make dehydration worse  • Get plenty of rest to help your body heal   Take naps throughout the day  Ask your healthcare provider when you can return to work and your normal activities  • Use a cool mist humidifier  to increase air moisture in your home  This may make it easier for you to breathe and help decrease your cough  • Drink tea with honey or use cough drops for a sore throat  Cough drops are available without a doctor's order  Follow directions for taking cough drops      • Do not smoke or be close to anyone who is smoking  Nicotine and other chemicals in cigarettes and cigars can cause lung damage  Smoking can also delay healing  Ask your healthcare provider for information if you currently smoke and need help to quit  E-cigarettes or smokeless tobacco still contain nicotine  Talk to your healthcare provider before you use these products  Prevent the spread of germs:   • Wash your hands often throughout the day  Use soap and water  Rub your soapy hands together, lacing your fingers, for at least 20 seconds  Rinse with warm, running water  Dry your hands with a clean towel or paper towel  Use hand  that contains alcohol if soap and water are not available  Teach children how to wash their hands and use hand   • Cover sneezes and coughs  Turn your face away and cover your mouth and nose with a tissue  Throw the tissue away  Use the bend of your arm if a tissue is not available  Then wash your hands well with soap and water or use hand   Teach children how to cover a cough or sneeze  • Stay home while you are sick  Avoid crowds as much as possible  • Get the influenza (flu) vaccine as soon as recommended each year  The flu vaccine is available starting in September or October  Ask your healthcare provider about the pneumonia vaccine  This vaccine is usually recommended every 5 years in older adults  Follow up with your doctor as directed:  Write down your questions so you remember to ask them during your visits  © Copyright [employer] 2022 Information is for End User's use only and may not be sold, redistributed or otherwise used for commercial purposes  The above information is an  only  It is not intended as medical advice for individual conditions or treatments  Talk to your doctor, nurse or pharmacist before following any medical regimen to see if it is safe and effective for you            Chief Complaint     Chief Complaint   Patient presents with   • Diarrhea     Pt reports N/V/D for the past 3 days, was at work today and covid tested self which was negative, PCP unable to get pt into office today  History of Present Illness       42-year-old female with a past medical history significant for bipolar 1 disorder, asthma, and depression presents with complaints of nasal congestion, abdominal cramping, nausea, vomiting, and diarrhea x3 days  Patient states that the vomiting has since stopped however she had 3 episodes of diarrhea today  Denies blood in stool  Further denies fever, chills, or cough  Decreased appetite but is drinking well  Able to tolerate fluids  Denies any known sick contacts or exposures  Negative COVID testing  She has been taking OTC Imodium for her symptoms  Review of Systems   Review of Systems   Constitutional: Negative for chills and fever  HENT: Positive for congestion and rhinorrhea  Negative for ear discharge, ear pain, sore throat, trouble swallowing and voice change  Eyes: Negative for discharge  Respiratory: Negative for cough and shortness of breath  Cardiovascular: Negative for chest pain  Gastrointestinal: Positive for abdominal pain, diarrhea, nausea and vomiting  Musculoskeletal: Negative for myalgias  Skin: Negative for rash           Current Medications       Current Outpatient Medications:   •  albuterol (PROVENTIL HFA,VENTOLIN HFA) 90 mcg/act inhaler, Inhale 2 puffs every 6 (six) hours as needed for wheezing, Disp: , Rfl:   •  budesonide-formoterol (SYMBICORT) 80-4 5 MCG/ACT inhaler, Inhale 2 puffs daily , Disp: , Rfl:   •  citalopram (CeleXA) 20 mg tablet, Take 40 mg by mouth daily , Disp: , Rfl:   •  fluticasone (FLOVENT HFA) 110 MCG/ACT inhaler, Inhale 2 puffs as needed Rinse mouth after use , Disp: , Rfl:   •  levothyroxine 112 mcg tablet, Take 112 mcg by mouth daily, Disp: , Rfl:   •  Multiple Vitamin (multivitamin) capsule, Take 1 capsule by mouth daily, Disp: , Rfl:   •  norgestimate-ethinyl estradiol (ORTHO-CYCLEN) 0 25-35 MG-MCG per tablet, Take 1 tablet by mouth daily, Disp: , Rfl:   •  QUEtiapine (SEROquel) 200 mg tablet, Take 250 mg by mouth daily at bedtime , Disp: , Rfl:   •  acetaminophen (TYLENOL) 500 mg tablet, Take 1,000 mg by mouth every 6 (six) hours as needed for mild pain (Patient not taking: Reported on 5/27/2022), Disp: , Rfl:   •  diclofenac (VOLTAREN) 75 mg EC tablet, Take 1 tablet (75 mg total) by mouth 2 (two) times a day Take with food (Patient not taking: Reported on 4/7/2023), Disp: 60 tablet, Rfl: 1    Current Allergies     Allergies as of 04/07/2023 - Reviewed 04/07/2023   Allergen Reaction Noted   • Escitalopram Fatigue 01/04/2017   • Fluoxetine GI Intolerance 01/04/2017            The following portions of the patient's history were reviewed and updated as appropriate: allergies, current medications, past family history, past medical history, past social history, past surgical history and problem list      Past Medical History:   Diagnosis Date   • Arthritis     Pt reports in right knee   • Asthma    • Bipolar 1 disorder ([facility] [address] )    • Chronic pain disorder     Chronic pain in lower back  • COVID-19 03/2021    Pt reports having flu-like symptoms     • Depression    • Disease of thyroid gland    • Lipoma     Pt reports lump behind right ear   • Nerve disorder    • Pet allergy    • Seasonal allergies        Past Surgical History:   Procedure Laterality Date   • BACK SURGERY Left 2019    laminectomy   • KNEE ARTHROSCOPY Right    • KNEE ARTHROSCOPY W/ ACL RECONSTRUCTION     • ORTHOPEDIC SURGERY     • KY ARTHROSCOPY KNEE SYNOVECTOMY 2/>COMPARTMENTS Right 09/16/2019    Procedure: ARTHROSCOPY KNEE  arthroscopy of right knee 1st, mini open right MPFL reconstruction with allograft;  Surgeon: Neal Olszewski, MD;  Location: [facility];  Service: Orthopedics   • KY EXCISION TUMOR SOFT TISS FACE/SCALP SUBQ 2 CM/> Right 09/27/2021    Procedure: BEHIND EAR MASS "EXCISION;  Surgeon: Bety King DO;  Location: [facility];  Service: General   • REVISION OF SCAR Right 11/18/2019    Procedure: REVISION SCAR EXTREMITY;  Surgeon: Shanika Toscano MD;  Location: [facility];  Service: Orthopedics   • WISDOM TOOTH EXTRACTION         Family History   Problem Relation Age of Onset   • Pneumonia Mother    • Multiple sclerosis Father          Medications have been verified  Objective   /86   Pulse 96   Temp 97 8 °F (36 6 °C)   Resp 19   Ht 5' 4\" (1 626 m)   Wt 97 5 kg (215 lb)   LMP 04/06/2023   SpO2 93%   BMI 36 90 kg/m²   Patient's last menstrual period was 04/06/2023  Physical Exam     Physical Exam  Vitals and nursing note reviewed  Constitutional:       General: She is not in acute distress  Appearance: She is not toxic-appearing  HENT:      Head: Normocephalic  Right Ear: Tympanic membrane, ear canal and external ear normal       Left Ear: Tympanic membrane, ear canal and external ear normal       Nose: Congestion present  Mouth/Throat:      Mouth: Mucous membranes are moist       Pharynx: Oropharynx is clear  No posterior oropharyngeal erythema  Eyes:      Conjunctiva/sclera: Conjunctivae normal    Cardiovascular:      Rate and Rhythm: Normal rate and regular rhythm  Heart sounds: Normal heart sounds  Pulmonary:      Effort: Pulmonary effort is normal  No respiratory distress  Breath sounds: Normal breath sounds  No stridor  No wheezing, rhonchi or rales  Abdominal:      General: Bowel sounds are normal       Palpations: Abdomen is soft  Tenderness: There is abdominal tenderness in the epigastric area  Lymphadenopathy:      Cervical: No cervical adenopathy  Skin:     General: Skin is warm and dry  Neurological:      Mental Status: She is alert and oriented to person, place, and time  Gait: Gait is intact     Psychiatric:         Mood and Affect: Mood normal          Behavior: Behavior normal  "

## 2023-04-07 NOTE — LETTER
April 7, 2023     Patient: Shala Sotelo   YOB: 1995   Date of Visit: 4/7/2023       To Whom it May Concern:    Bubba Higgins was seen in my clinic on 4/7/2023  She may return to work on 4/8/2023  If you have any questions or concerns, please don't hesitate to call           Sincerely,          WAYNE Monsalve        CC: No Recipients

## 2023-04-08 ENCOUNTER — TELEPHONE (OUTPATIENT)
Dept: URGENT CARE | Facility: CLINIC | Age: 28
End: 2023-04-08

## 2023-04-08 NOTE — TELEPHONE ENCOUNTER
Patient called asking for an extended work note until 4/9 23 she called out of work again today due to symptoms not getting better      Romero Celaya PA-C extended her note

## 2024-11-25 NOTE — TELEPHONE ENCOUNTER
Pt reports 10% improvement post inj   Pain level 3-4/10  Pt aware I will call next week for an update   Pt saw Haydee over the weekend, looks like pt did her x-ray today.

## 2025-01-15 ENCOUNTER — OFFICE VISIT (OUTPATIENT)
Dept: URGENT CARE | Facility: CLINIC | Age: 30
End: 2025-01-15
Payer: COMMERCIAL

## 2025-01-15 VITALS
DIASTOLIC BLOOD PRESSURE: 68 MMHG | SYSTOLIC BLOOD PRESSURE: 114 MMHG | TEMPERATURE: 98.9 F | WEIGHT: 240 LBS | BODY MASS INDEX: 42.52 KG/M2 | HEIGHT: 63 IN | HEART RATE: 113 BPM | OXYGEN SATURATION: 98 % | RESPIRATION RATE: 20 BRPM

## 2025-01-15 DIAGNOSIS — J06.9 ACUTE URI: Primary | ICD-10-CM

## 2025-01-15 DIAGNOSIS — J45.21 MILD INTERMITTENT ASTHMA WITH ACUTE EXACERBATION: ICD-10-CM

## 2025-01-15 PROCEDURE — 99213 OFFICE O/P EST LOW 20 MIN: CPT | Performed by: PHYSICIAN ASSISTANT

## 2025-01-15 RX ORDER — PREDNISONE 50 MG/1
50 TABLET ORAL DAILY
Qty: 5 TABLET | Refills: 0 | Status: SHIPPED | OUTPATIENT
Start: 2025-01-15 | End: 2025-01-20

## 2025-01-15 RX ORDER — ALBUTEROL SULFATE 0.83 MG/ML
2.5 SOLUTION RESPIRATORY (INHALATION) EVERY 4 HOURS PRN
Qty: 3 ML | Refills: 0 | Status: SHIPPED | OUTPATIENT
Start: 2025-01-15

## 2025-01-15 RX ORDER — CLONAZEPAM 0.5 MG/1
1 TABLET ORAL 2 TIMES DAILY PRN
COMMUNITY

## 2025-01-15 NOTE — PATIENT INSTRUCTIONS
"Take prednisone as directed until completed  Use medications as directed as needed for symptomatic relief  Continue home medications as prescribed  Motrin and or Tylenol as needed for fever pain  Follow up with PCP in 3-5 days.  Proceed to  ER if symptoms worsen.    If tests have been performed at [facility], our office will contact you with results if changes need to be made to the care plan discussed with you at the visit.  You can review your full results on [facility] MyChart.    Patient Education     Asthma in adults   The Basics   Written by the doctors and editors at [facility]   What is asthma? -- Asthma is a condition that can make it hard to breathe. Asthma symptoms can be mild or severe. They can come and go. An asthma \"attack\" is when symptoms start suddenly. This happens when the airways in the lungs become more narrow and inflamed (figure 1).  Asthma can run in families.  What are the symptoms of asthma? -- Asthma symptoms can include:   Wheezing or noisy breathing   Coughing   Tight feeling in the chest   Shortness of breath  Symptoms can happen each day, each week, or less often. Symptoms can range from mild to severe. Although it is rare, an asthma attack can sometimes even lead to death.  Is there a test for asthma? -- Yes. Your doctor will ask you about your symptoms and have you do a breathing test to check how your lungs are working.  If your doctor thinks that allergies might be making your asthma worse, they might suggest allergy testing. This can include skin tests or blood tests.  How is asthma treated? -- Asthma is treated with different types of medicines. The medicines can be inhalers, liquids, or pills. Your doctor will prescribe medicine based on how often you have symptoms and how serious your symptoms are. There are 2 main types of asthma medicines:   Quick-relief medicines stop symptoms quickly, in 5 to 15 minutes. Almost everyone with asthma carries a quick-relief inhaler with them. " "People use these medicines whenever they have asthma symptoms. Most people need these medicines 1 or 2 times a week, or less often. But when asthma symptoms get worse, more doses might be needed.   Long-term controller medicines control asthma and help prevent future attacks. People who get asthma symptoms more than 2 times a week should use a controller medicine every day.  Some medicines can work as both a controller medicine and a quick-relief medicine. These are taken once or twice a day as controller medicines. They can also be used for quick relief.  It is very important to take all of the medicines the doctor prescribes, exactly how you are supposed to take them. You might have to take medicines a few times a day. Your doctor, nurse, or pharmacist will show you the right way to use your inhaler(s).  If your symptoms get much worse all of a sudden, use your quick-relief medicine and contact your doctor or nurse. You might need to go to the hospital for treatment.  What is an asthma action plan? -- An asthma action plan is a list of instructions that tell you (form 1):   Which medicines to use each day at home   Which medicines to take if your symptoms get worse   When to get help or call for an ambulance  If you have frequent or severe asthma symptoms, your doctor might suggest that you have an asthma action plan. If so, you and your doctor will work together to make one. As part of your action plan, you might need to use something called a \"peak flow meter.\" Breathing into this device will show how your lungs are working. Your doctor will show you the right way to use your peak flow meter.  Can asthma symptoms be prevented? -- There are things that you can do to help prevent asthma attacks. Your doctor or nurse can talk to you about what is most important for you.  In general, you can:   Avoid \"triggers\" - These are things that make your symptoms worse. Common triggers include smoke, air pollution, dust, " "mold, pollen, strong chemicals or smells, and very cold or dry air. For some people, being around certain animals can trigger symptoms. Exercise and stress can also be triggers.  Some adults with asthma have worse symptoms if they take aspirin or medicines called NSAIDs. NSAIDs include ibuprofen (sample brand names: Advil, Motrin) and naproxen (sample brand names: Aleve, Naprosyn). Ask your doctor if you need to avoid these medicines.  If you can't avoid certain triggers, talk with your doctor about what you can do. For example, you might need to take an extra dose of your quick-relief inhaler medicine before you exercise or are around things you are allergic to.   Lower your risk of getting sick - Some infections can make asthma symptoms worse. These include the common cold, the flu, and coronavirus disease 2019 (\"COVID-19\").  It's important to get the COVID-19 vaccine. This will lower the risk of severe illness if you do get COVID-19. You should also get a flu shot every year. Plus, some people need to get a vaccine to help prevent pneumonia.  If you think that you might have an infection, tell your doctor or nurse. They can help you figure out if you need treatment.   Make sure that you know how and when to take your medicines - If you take controller medicines, follow all instructions to help prevent symptoms. You should also make sure that you know how and when to use your quick-relief medicine.   See your doctor or nurse regularly - If you need asthma medicine every day, you should see your doctor or nurse at least every 6 months. At these appointments, they will ask about your symptoms, check how well your lungs are working, and talk about your treatment plan.  What if I want to get pregnant? -- If you want to get pregnant, talk to your doctor about how to control your asthma. Keeping your asthma well controlled is important for the health of your baby. Most asthma medicines are safe to take if you are " pregnant.  When should I call the doctor? -- Call for an ambulance (in the [place] and [place], call 9-1-1) if you have severe symptoms like:   You have so much trouble breathing that you cannot talk.   Your lips or fingernails turn gray or blue.  Call your doctor or nurse if:   You have an asthma attack and the symptoms do not improve, or get worse, after using a quick-relief medicine.   You need to use your quick-relief medicine more than 2 times a week.   You cannot do your normal activities because of your asthma symptoms.   You have any questions about your medicines.  All topics are updated as new evidence becomes available and our peer review process is complete.  This topic retrieved from [employer] on: Mar 29, 2024.  Topic 87788 Version 24.0  Release: 32.2.4 - C32.87  © 2024 UpToDate, Inc. and/or its affiliates. All rights reserved.  figure 1: Child with asthma     During an asthma attack or flare-up, the muscles around the airways tighten (constrict), and the lining of the airways gets inflamed. Then, mucus builds up. All of this makes it hard to breathe.  Graphic 04720 Version 10.0  form 1: Asthma action plan (adult)     Graphic 827868 Version 1.0  Consumer Information Use and Disclaimer   Disclaimer: This generalized information is a limited summary of diagnosis, treatment, and/or medication information. It is not meant to be comprehensive and should be used as a tool to help the user understand and/or assess potential diagnostic and treatment options. It does NOT include all information about conditions, treatments, medications, side effects, or risks that may apply to a specific patient. It is not intended to be medical advice or a substitute for the medical advice, diagnosis, or treatment of a health care provider based on the health care provider's examination and assessment of a patient's specific and unique circumstances. Patients must speak with a health care provider for complete information about their  "health, medical questions, and treatment options, including any risks or benefits regarding use of medications. This information does not endorse any treatments or medications as safe, effective, or approved for treating a specific patient. UpToDate, Inc. and its affiliates disclaim any warranty or liability relating to this information or the use thereof.The use of this information is governed by the Terms of Use, available at https://www.woltersMinderestuwer.com/en/know/clinical-effectiveness-terms. 2024© UpToDate, Inc. and its affiliates and/or licensors. All rights reserved.  Copyright   © 2024 UpToDate, Inc. and/or its affiliates. All rights reserved.      Patient Education     Upper respiratory infection in adults - Discharge instructions   The Basics   Written by the doctors and editors at [employer]   What are discharge instructions? -- Discharge instructions are information about how to take care of yourself after getting medical care for a health problem.  What is an upper respiratory infection? -- An upper respiratory infection (\"URI\") is an illness that can affect your nose, throat, ears, and sinuses. Almost all URIs are caused by a virus. The common cold is an example of a viral URI. Some URIs are caused by bacteria, but this is much less common.  URIs spread easily from person to person, most often through coughing or sneezing. A URI will almost always get better in a week or 2 without any treatment. Because most URIs are caused by viruses, antibiotics do not usually help.  If you do have a bacterial infection, your doctor might prescribe antibiotics.  How do I care for myself at home? -- Ask the doctor or nurse what you should do when you go home. Make sure that you understand exactly what you need to do to care for yourself. Ask questions if there is anything you do not understand.  You should also:   Wash your hands often (figure 1), and cough or sneeze into a tissue. If you do not have a tissue, cough or " sneeze into your elbow instead of your hands.   Drink lots of fluids (water, juice, or broth) to stay hydrated, unless your doctor told you otherwise. This will help replace any fluids lost through runny nose or fever. Warm tea or soup can also help soothe a sore throat.   To help a stuffy nose and make it easier to breathe:   Use saline nose drops or spray.   Use a humidifier if the air in your home feels dry.   Follow the directions on the label carefully if you take over-the-counter cough or cold medicines. Do not take more than 1 medicine that contains acetaminophen. Also, if you have a heart problem or high blood pressure, check with your doctor before you take any of these medicines.   Try to quit smoking if you smoke. Your doctor or nurse can help.  How can I prevent getting another URI? -- The best way to prevent a URI, or keep it from spreading to others, is to keep your hands clean. Wash your hands often with soap and water or alcohol gel rubs.  Some other ways to prevent the spread of infection include:   Always wash your hands with soap and water after you cough, sneeze, or blow your nose.   Clean surfaces and objects that you touch a lot. These include sinks, counters, tables, door handles, remotes, and phones. Use a bleach and water mixture. The germs that cause a URI can live on surfaces for at least 2 hours.   Do not share cups, food, towels, bed linens, or other personal items.   Stay away from other people when you are sick. When you do need to be around other people, consider wearing a face mask.  When should I call the doctor? -- Call for advice if:   You have a persistent fever of 100.4°F (38°C) or higher, chills, a very bad sore throat, or ear or sinus pain.   You get a new fever after several days of feeling the same or getting better.   You start having chest pain when you cough.   You have a cough that lasts more than 10 days.   You cough up blood.   You have any new or worsening symptoms,  such as worsening cough or trouble breathing.  All topics are updated as new evidence becomes available and our peer review process is complete.  This topic retrieved from [employer] on: Mar 13, 2024.  Topic 750253 Version 1.0  Release: 32.2.4 - C32.71  © 2024 UpToDate, Inc. and/or its affiliates. All rights reserved.  figure 1: How to wash your hands     Wet your hands with clean water, and apply a small amount of soap. Lather and rub hands together for at least 20 seconds. Clean your wrists, palms, backs of your hands, between your fingers, tips of your fingers, thumbs, and under and around your nails. Rinse well, and dry your hands using a clean towel.  Graphic 732516 Version 7.0  Consumer Information Use and Disclaimer   Disclaimer: This generalized information is a limited summary of diagnosis, treatment, and/or medication information. It is not meant to be comprehensive and should be used as a tool to help the user understand and/or assess potential diagnostic and treatment options. It does NOT include all information about conditions, treatments, medications, side effects, or risks that may apply to a specific patient. It is not intended to be medical advice or a substitute for the medical advice, diagnosis, or treatment of a health care provider based on the health care provider's examination and assessment of a patient's specific and unique circumstances. Patients must speak with a health care provider for complete information about their health, medical questions, and treatment options, including any risks or benefits regarding use of medications. This information does not endorse any treatments or medications as safe, effective, or approved for treating a specific patient. UpToDate, Inc. and its affiliates disclaim any warranty or liability relating to this information or the use thereof.The use of this information is governed by the Terms of Use, available at  https://www.woltersPremonixuwer.com/en/know/clinical-effectiveness-terms. 2024© [employer] and its affiliates and/or licensors. All rights reserved.  Copyright   © 2024 [employer] and/or its affiliates. All rights reserved.

## 2025-01-15 NOTE — PROGRESS NOTES
[facility]        NAME: Meka Leonardo is a 29 y.o. female  : 1995    MRN: [id number]  DATE: January 15, 2025  TIME: 12:22 PM    Assessment and Plan   Acute URI [J06.9]  1. Acute URI  predniSONE 50 mg tablet    albuterol (2.5 mg/3 mL) 0.083 % nebulizer solution      2. Mild intermittent asthma with acute exacerbation  predniSONE 50 mg tablet    albuterol (2.5 mg/3 mL) 0.083 % nebulizer solution            Patient Instructions     Take prednisone as directed until completed  Use medications as directed as needed for symptomatic relief  Continue home medications as prescribed  Motrin and or Tylenol as needed for fever pain  Follow up with PCP in 3-5 days.  Proceed to  ER if symptoms worsen.    If tests have been performed at [facility], our office will contact you with results if changes need to be made to the care plan discussed with you at the visit.  You can review your full results on [facility] MyChart.    Chief Complaint     Chief Complaint   Patient presents with    Asthma     Stated her asthma is acting up. Has some sob, wheezing and a little dizziness         History of Present Illness       29-year-old female presents with asthma symptoms and minor URI symptoms.  Patient reports symptoms started a day or so ago.  Been have a little runny nose congestion and asthma has been flaring up more.  Patient states that her asthma symptoms are typically worse in the wintertime but also when she has any type of URIs.  Patient states she has been using her medications at home.  Feels as though that her machine at home is not functioning properly because she is getting very minimal relief when she does nebulizer treatment.  Denies any fevers or chills.  No sore throat or ear pain.  Denies any headaches.  No chest pain reported feels some shortness of breath and wheezing which she has had in the past.  Denies any abdominal pain nausea vomiting or diarrhea.    Asthma  She complains of chest tightness,  shortness of breath, sputum production and wheezing. This is a new problem. The current episode started yesterday. The problem occurs constantly. The problem has been waxing and waning. Associated symptoms include nasal congestion, postnasal drip and rhinorrhea. Pertinent negatives include no appetite change, ear congestion, fever or sore throat. Her symptoms are aggravated by URI. Her symptoms are alleviated by beta-agonist and rest. She reports minimal improvement on treatment. Her symptoms are not alleviated by cold air. Her past medical history is significant for asthma.       Review of Systems   Review of Systems   Constitutional: Negative.  Negative for appetite change and fever.   HENT:  Positive for postnasal drip and rhinorrhea. Negative for sore throat.    Eyes: Negative.    Respiratory:  Positive for sputum production, chest tightness, shortness of breath and wheezing.    Cardiovascular: Negative.    Gastrointestinal: Negative.    Musculoskeletal: Negative.    Skin: Negative.    Neurological: Negative.          Current Medications       Current Outpatient Medications:     acetaminophen (TYLENOL) 500 mg tablet, Take 1,000 mg by mouth every 6 (six) hours as needed for mild pain, Disp: , Rfl:     albuterol (2.5 mg/3 mL) 0.083 % nebulizer solution, Take 3 mL (2.5 mg total) by nebulization every 4 (four) hours as needed for wheezing or shortness of breath, Disp: 3 mL, Rfl: 0    albuterol (PROVENTIL HFA,VENTOLIN HFA) 90 mcg/act inhaler, Inhale 2 puffs every 6 (six) hours as needed for wheezing, Disp: , Rfl:     budesonide-formoterol (SYMBICORT) 80-4.5 MCG/ACT inhaler, Inhale 2 puffs daily , Disp: , Rfl:     citalopram (CeleXA) 20 mg tablet, Take 40 mg by mouth daily , Disp: , Rfl:     clonazePAM (KlonoPIN) 0.5 mg tablet, Take 1 tablet by mouth 2 (two) times a day as needed, Disp: , Rfl:     fluticasone (FLOVENT HFA) 110 MCG/ACT inhaler, Inhale 2 puffs as needed Rinse mouth after use., Disp: , Rfl:      levothyroxine 112 mcg tablet, Take 112 mcg by mouth daily, Disp: , Rfl:     Multiple Vitamin (multivitamin) capsule, Take 1 capsule by mouth daily, Disp: , Rfl:     norgestimate-ethinyl estradiol (ORTHO-CYCLEN) 0.25-35 MG-MCG per tablet, Take 1 tablet by mouth daily, Disp: , Rfl:     predniSONE 50 mg tablet, Take 1 tablet (50 mg total) by mouth daily for 5 days, Disp: 5 tablet, Rfl: 0    QUEtiapine (SEROquel) 200 mg tablet, Take 250 mg by mouth daily at bedtime , Disp: , Rfl:     Current Allergies     Allergies as of 01/15/2025 - Reviewed 01/15/2025   Allergen Reaction Noted    Fluoxetine GI Intolerance 01/04/2017            The following portions of the patient's history were reviewed and updated as appropriate: allergies, current medications, past family history, past medical history, past social history, past surgical history and problem list.     Past Medical History:   Diagnosis Date    Arthritis     Pt reports in right knee    Asthma     Bipolar 1 disorder (HCC)     Chronic pain disorder     Chronic pain in lower back.    COVID-19 03/2021    Pt reports having flu-like symptoms.    Depression     Disease of thyroid gland     Lipoma     Pt reports lump behind right ear    Nerve disorder     Pet allergy     Seasonal allergies        Past Surgical History:   Procedure Laterality Date    BACK SURGERY Left 2019    laminectomy    KNEE ARTHROSCOPY Right     KNEE ARTHROSCOPY W/ ACL RECONSTRUCTION      ORTHOPEDIC SURGERY      NM ARTHROSCOPY KNEE SYNOVECTOMY 2/>COMPARTMENTS Right 09/16/2019    Procedure: ARTHROSCOPY KNEE  arthroscopy of right knee 1st, mini open right MPFL reconstruction with allograft;  Surgeon: Ced Gan MD;  Location: [facility];  Service: Orthopedics    NM EXCISION TUMOR SOFT TISS FACE/SCALP SUBQ 2 CM/> Right 09/27/2021    Procedure: BEHIND EAR MASS EXCISION;  Surgeon: Princess Leal DO;  Location:  MAIN OR;  Service: General    REVISION OF SCAR Right 11/18/2019    Procedure: REVISION SCAR  "EXTREMITY;  Surgeon: Ced Gan MD;  Location: [facility];  Service: Orthopedics    WISDOM TOOTH EXTRACTION         Family History   Problem Relation Age of Onset    Pneumonia Mother     Multiple sclerosis Father          Medications have been verified.        Objective   /68   Pulse (!) 113   Temp 98.9 °F (37.2 °C)   Resp 20   Ht 5' 3\" (1.6 m)   Wt 109 kg (240 lb)   LMP 01/05/2025   SpO2 98%   BMI 42.51 kg/m²   Patient's last menstrual period was 01/05/2025.       Physical Exam     Physical Exam  Vitals and nursing note reviewed.   Constitutional:       General: She is not in acute distress.     Appearance: Normal appearance. She is well-developed.   HENT:      Head: Normocephalic and atraumatic.      Right Ear: Hearing, tympanic membrane, ear canal and external ear normal. There is no impacted cerumen.      Left Ear: Hearing, tympanic membrane, ear canal and external ear normal. There is no impacted cerumen.      Nose: Nose normal.      Mouth/Throat:      Pharynx: Uvula midline. No oropharyngeal exudate.   Eyes:      General:         Right eye: No discharge.         Left eye: No discharge.      Conjunctiva/sclera: Conjunctivae normal.   Cardiovascular:      Rate and Rhythm: Normal rate and regular rhythm.      Heart sounds: Normal heart sounds. No murmur heard.  Pulmonary:      Effort: Pulmonary effort is normal. No respiratory distress.      Breath sounds: Decreased breath sounds (Mild throughout) and wheezing (Mild expiratory wheezes noted throughout) present. No rales.   Abdominal:      General: Bowel sounds are normal.      Palpations: Abdomen is soft.      Tenderness: There is no abdominal tenderness.   Musculoskeletal:         General: Normal range of motion.      Cervical back: Normal range of motion and neck supple.   Lymphadenopathy:      Cervical: No cervical adenopathy.   Skin:     General: Skin is warm and dry.   Neurological:      Mental Status: She is alert and oriented to person, " place, and time.   Psychiatric:         Mood and Affect: Mood normal.       MDM: Patient was given nebulizer and DME paperwork was filled out.  Prescription for additional albuterol was prescribed.

## 2025-01-15 NOTE — LETTER
January 15, 2025     Patient: Meka Leonardo   YOB: 1995   Date of Visit: 1/15/2025       To Whom it May Concern:    Meka Leonardo was seen in my clinic on 1/15/2025. She may return to school on 1/16/2025 .    If you have any questions or concerns, please don't hesitate to call.         Sincerely,          Eyal Rudolph PA-C        CC: No Recipients

## 2025-02-23 ENCOUNTER — HOSPITAL ENCOUNTER (INPATIENT)
Facility: HOSPITAL | Age: 30
LOS: 2 days | Discharge: HOME | DRG: 115 | End: 2025-02-25
Attending: EMERGENCY MEDICINE | Admitting: FAMILY MEDICINE
Payer: COMMERCIAL

## 2025-02-23 ENCOUNTER — APPOINTMENT (EMERGENCY)
Dept: CT IMAGING | Facility: HOSPITAL | Age: 30
DRG: 115 | End: 2025-02-23
Payer: COMMERCIAL

## 2025-02-23 DIAGNOSIS — J04.30 SUPRAGLOTTITIS: ICD-10-CM

## 2025-02-23 DIAGNOSIS — L03.211 FACIAL CELLULITIS: ICD-10-CM

## 2025-02-23 DIAGNOSIS — K11.20 SIALOADENITIS: Primary | ICD-10-CM

## 2025-02-23 PROBLEM — F31.9 BIPOLAR 1 DISORDER (HCC): Status: ACTIVE | Noted: 2025-02-23

## 2025-02-23 LAB
ALBUMIN SERPL BCG-MCNC: 4.1 G/DL (ref 3.5–5)
ALP SERPL-CCNC: 69 U/L (ref 34–104)
ALT SERPL W P-5'-P-CCNC: 11 U/L (ref 7–52)
ANION GAP SERPL CALCULATED.3IONS-SCNC: 7 MMOL/L (ref 4–13)
APTT PPP: 26 SECONDS (ref 23–34)
AST SERPL W P-5'-P-CCNC: 11 U/L (ref 13–39)
BASOPHILS # BLD AUTO: 0.05 THOUSANDS/ÂΜL (ref 0–0.1)
BASOPHILS NFR BLD AUTO: 1 % (ref 0–1)
BILIRUB SERPL-MCNC: 0.38 MG/DL (ref 0.2–1)
BUN SERPL-MCNC: 11 MG/DL (ref 5–25)
CALCIUM SERPL-MCNC: 9.1 MG/DL (ref 8.4–10.2)
CHLORIDE SERPL-SCNC: 102 MMOL/L (ref 96–108)
CO2 SERPL-SCNC: 28 MMOL/L (ref 21–32)
CREAT SERPL-MCNC: 0.81 MG/DL (ref 0.6–1.3)
CRP SERPL QL: 20 MG/L
EOSINOPHIL # BLD AUTO: 0.17 THOUSAND/ÂΜL (ref 0–0.61)
EOSINOPHIL NFR BLD AUTO: 2 % (ref 0–6)
ERYTHROCYTE [DISTWIDTH] IN BLOOD BY AUTOMATED COUNT: 14.1 % (ref 11.6–15.1)
ERYTHROCYTE [SEDIMENTATION RATE] IN BLOOD: 15 MM/HOUR (ref 0–19)
GFR SERPL CREATININE-BSD FRML MDRD: 98 ML/MIN/1.73SQ M
GLUCOSE SERPL-MCNC: 93 MG/DL (ref 65–140)
HCT VFR BLD AUTO: 41.9 % (ref 34.8–46.1)
HGB BLD-MCNC: 13.8 G/DL (ref 11.5–15.4)
IMM GRANULOCYTES # BLD AUTO: 0.05 THOUSAND/UL (ref 0–0.2)
IMM GRANULOCYTES NFR BLD AUTO: 1 % (ref 0–2)
INR PPP: 0.92 (ref 0.85–1.19)
LACTATE SERPL-SCNC: 0.8 MMOL/L (ref 0.5–2)
LYMPHOCYTES # BLD AUTO: 1.5 THOUSANDS/ÂΜL (ref 0.6–4.47)
LYMPHOCYTES NFR BLD AUTO: 15 % (ref 14–44)
MCH RBC QN AUTO: 29.9 PG (ref 26.8–34.3)
MCHC RBC AUTO-ENTMCNC: 32.9 G/DL (ref 31.4–37.4)
MCV RBC AUTO: 91 FL (ref 82–98)
MONOCYTES # BLD AUTO: 0.27 THOUSAND/ÂΜL (ref 0.17–1.22)
MONOCYTES NFR BLD AUTO: 3 % (ref 4–12)
NEUTROPHILS # BLD AUTO: 7.94 THOUSANDS/ÂΜL (ref 1.85–7.62)
NEUTS SEG NFR BLD AUTO: 78 % (ref 43–75)
NRBC BLD AUTO-RTO: 0 /100 WBCS
PLATELET # BLD AUTO: 253 THOUSANDS/UL (ref 149–390)
PMV BLD AUTO: 10 FL (ref 8.9–12.7)
POTASSIUM SERPL-SCNC: 4.5 MMOL/L (ref 3.5–5.3)
PROCALCITONIN SERPL-MCNC: <0.05 NG/ML
PROT SERPL-MCNC: 7.6 G/DL (ref 6.4–8.4)
PROTHROMBIN TIME: 12.8 SECONDS (ref 12.3–15)
RBC # BLD AUTO: 4.62 MILLION/UL (ref 3.81–5.12)
SODIUM SERPL-SCNC: 137 MMOL/L (ref 135–147)
WBC # BLD AUTO: 9.98 THOUSAND/UL (ref 4.31–10.16)

## 2025-02-23 PROCEDURE — 85652 RBC SED RATE AUTOMATED: CPT | Performed by: EMERGENCY MEDICINE

## 2025-02-23 PROCEDURE — 84145 PROCALCITONIN (PCT): CPT | Performed by: EMERGENCY MEDICINE

## 2025-02-23 PROCEDURE — 85025 COMPLETE CBC W/AUTO DIFF WBC: CPT | Performed by: EMERGENCY MEDICINE

## 2025-02-23 PROCEDURE — 96361 HYDRATE IV INFUSION ADD-ON: CPT

## 2025-02-23 PROCEDURE — 70491 CT SOFT TISSUE NECK W/DYE: CPT

## 2025-02-23 PROCEDURE — 99222 1ST HOSP IP/OBS MODERATE 55: CPT | Performed by: FAMILY MEDICINE

## 2025-02-23 PROCEDURE — 85730 THROMBOPLASTIN TIME PARTIAL: CPT | Performed by: EMERGENCY MEDICINE

## 2025-02-23 PROCEDURE — 93005 ELECTROCARDIOGRAM TRACING: CPT

## 2025-02-23 PROCEDURE — 83605 ASSAY OF LACTIC ACID: CPT | Performed by: EMERGENCY MEDICINE

## 2025-02-23 PROCEDURE — 87040 BLOOD CULTURE FOR BACTERIA: CPT | Performed by: EMERGENCY MEDICINE

## 2025-02-23 PROCEDURE — 86140 C-REACTIVE PROTEIN: CPT | Performed by: EMERGENCY MEDICINE

## 2025-02-23 PROCEDURE — 96375 TX/PRO/DX INJ NEW DRUG ADDON: CPT

## 2025-02-23 PROCEDURE — 85610 PROTHROMBIN TIME: CPT | Performed by: EMERGENCY MEDICINE

## 2025-02-23 PROCEDURE — 99284 EMERGENCY DEPT VISIT MOD MDM: CPT

## 2025-02-23 PROCEDURE — 99285 EMERGENCY DEPT VISIT HI MDM: CPT | Performed by: EMERGENCY MEDICINE

## 2025-02-23 PROCEDURE — 96374 THER/PROPH/DIAG INJ IV PUSH: CPT

## 2025-02-23 PROCEDURE — 80053 COMPREHEN METABOLIC PANEL: CPT | Performed by: EMERGENCY MEDICINE

## 2025-02-23 PROCEDURE — 36415 COLL VENOUS BLD VENIPUNCTURE: CPT | Performed by: EMERGENCY MEDICINE

## 2025-02-23 RX ORDER — ONDANSETRON 2 MG/ML
4 INJECTION INTRAMUSCULAR; INTRAVENOUS EVERY 6 HOURS PRN
Status: DISCONTINUED | OUTPATIENT
Start: 2025-02-23 | End: 2025-02-25 | Stop reason: HOSPADM

## 2025-02-23 RX ORDER — DEXAMETHASONE SODIUM PHOSPHATE 4 MG/ML
4 INJECTION, SOLUTION INTRA-ARTICULAR; INTRALESIONAL; INTRAMUSCULAR; INTRAVENOUS; SOFT TISSUE EVERY 12 HOURS SCHEDULED
Status: DISCONTINUED | OUTPATIENT
Start: 2025-02-23 | End: 2025-02-23

## 2025-02-23 RX ORDER — LEVOTHYROXINE SODIUM 112 UG/1
112 TABLET ORAL DAILY
Status: DISCONTINUED | OUTPATIENT
Start: 2025-02-24 | End: 2025-02-25 | Stop reason: HOSPADM

## 2025-02-23 RX ORDER — DEXAMETHASONE SODIUM PHOSPHATE 10 MG/ML
10 INJECTION, SOLUTION INTRAMUSCULAR; INTRAVENOUS EVERY 8 HOURS SCHEDULED
Status: COMPLETED | OUTPATIENT
Start: 2025-02-23 | End: 2025-02-24

## 2025-02-23 RX ORDER — MORPHINE SULFATE 4 MG/ML
4 INJECTION, SOLUTION INTRAMUSCULAR; INTRAVENOUS EVERY 4 HOURS PRN
Status: DISCONTINUED | OUTPATIENT
Start: 2025-02-23 | End: 2025-02-25 | Stop reason: HOSPADM

## 2025-02-23 RX ORDER — LIDOCAINE HYDROCHLORIDE 20 MG/ML
JELLY TOPICAL ONCE
Status: COMPLETED | OUTPATIENT
Start: 2025-02-23 | End: 2025-02-23

## 2025-02-23 RX ORDER — BUDESONIDE AND FORMOTEROL FUMARATE DIHYDRATE 80; 4.5 UG/1; UG/1
2 AEROSOL RESPIRATORY (INHALATION) DAILY
Status: DISCONTINUED | OUTPATIENT
Start: 2025-02-24 | End: 2025-02-25 | Stop reason: HOSPADM

## 2025-02-23 RX ORDER — ALBUTEROL SULFATE 90 UG/1
2 INHALANT RESPIRATORY (INHALATION) EVERY 6 HOURS PRN
Status: DISCONTINUED | OUTPATIENT
Start: 2025-02-23 | End: 2025-02-25 | Stop reason: HOSPADM

## 2025-02-23 RX ORDER — POLYETHYLENE GLYCOL 3350 17 G/17G
17 POWDER, FOR SOLUTION ORAL DAILY PRN
Status: DISCONTINUED | OUTPATIENT
Start: 2025-02-23 | End: 2025-02-25 | Stop reason: HOSPADM

## 2025-02-23 RX ORDER — QUETIAPINE FUMARATE 100 MG/1
100 TABLET, FILM COATED ORAL
Status: DISCONTINUED | OUTPATIENT
Start: 2025-02-23 | End: 2025-02-25 | Stop reason: HOSPADM

## 2025-02-23 RX ORDER — ACETAMINOPHEN 325 MG/1
650 TABLET ORAL EVERY 6 HOURS PRN
Status: DISCONTINUED | OUTPATIENT
Start: 2025-02-23 | End: 2025-02-25 | Stop reason: HOSPADM

## 2025-02-23 RX ORDER — CLONAZEPAM 0.5 MG/1
0.5 TABLET ORAL 2 TIMES DAILY PRN
Status: DISCONTINUED | OUTPATIENT
Start: 2025-02-23 | End: 2025-02-25 | Stop reason: HOSPADM

## 2025-02-23 RX ORDER — CITALOPRAM HYDROBROMIDE 20 MG/1
40 TABLET ORAL DAILY
Status: DISCONTINUED | OUTPATIENT
Start: 2025-02-24 | End: 2025-02-25 | Stop reason: HOSPADM

## 2025-02-23 RX ORDER — KETOROLAC TROMETHAMINE 30 MG/ML
30 INJECTION, SOLUTION INTRAMUSCULAR; INTRAVENOUS ONCE
Status: COMPLETED | OUTPATIENT
Start: 2025-02-23 | End: 2025-02-23

## 2025-02-23 RX ORDER — DEXAMETHASONE SODIUM PHOSPHATE 10 MG/ML
10 INJECTION, SOLUTION INTRAMUSCULAR; INTRAVENOUS ONCE
Status: COMPLETED | OUTPATIENT
Start: 2025-02-23 | End: 2025-02-23

## 2025-02-23 RX ADMIN — DEXAMETHASONE SODIUM PHOSPHATE 10 MG: 10 INJECTION, SOLUTION INTRAMUSCULAR; INTRAVENOUS at 13:14

## 2025-02-23 RX ADMIN — AMPICILLIN SODIUM AND SULBACTAM SODIUM 3 G: 10; 5 INJECTION, POWDER, FOR SOLUTION INTRAMUSCULAR; INTRAVENOUS at 20:52

## 2025-02-23 RX ADMIN — QUETIAPINE FUMARATE 100 MG: 100 TABLET ORAL at 20:53

## 2025-02-23 RX ADMIN — LIDOCAINE HYDROCHLORIDE: 20 JELLY TOPICAL at 17:17

## 2025-02-23 RX ADMIN — IOHEXOL 85 ML: 350 INJECTION, SOLUTION INTRAVENOUS at 13:49

## 2025-02-23 RX ADMIN — AMPICILLIN SODIUM AND SULBACTAM SODIUM 3 G: 2; 1 INJECTION, POWDER, FOR SOLUTION INTRAMUSCULAR; INTRAVENOUS at 15:07

## 2025-02-23 RX ADMIN — KETOROLAC TROMETHAMINE 30 MG: 30 INJECTION, SOLUTION INTRAMUSCULAR; INTRAVENOUS at 13:14

## 2025-02-23 RX ADMIN — DEXAMETHASONE SODIUM PHOSPHATE 10 MG: 10 INJECTION, SOLUTION INTRAMUSCULAR; INTRAVENOUS at 17:11

## 2025-02-23 RX ADMIN — SODIUM CHLORIDE 1000 ML: 0.9 INJECTION, SOLUTION INTRAVENOUS at 13:13

## 2025-02-23 NOTE — ED PROVIDER NOTES
Time reflects when diagnosis was documented in both MDM as applicable and the Disposition within this note       Time User Action Codes Description Comment    2/23/2025  3:09 PM Erick Holt Add [K11.20] Sialoadenitis     2/23/2025  3:09 PM Erick Holt Add [L03.211] Facial cellulitis     2/23/2025  3:09 PM Erick Holt Add [J04.30] Supraglottitis           ED Disposition       ED Disposition   Admit    Condition   Stable    Date/Time   Sun Feb 23, 2025  3:09 PM    Comment   Case was discussed with Dr. Gramajo and the patient's admission status was agreed to be Admission Status: inpatient status to the service of Dr. Gramajo.               Assessment & Plan       Medical Decision Making  Problems Addressed:  Facial cellulitis: acute illness or injury  Sialoadenitis: acute illness or injury  Supraglottitis: acute illness or injury    Amount and/or Complexity of Data Reviewed  Labs: ordered. Decision-making details documented in ED Course.  Radiology: ordered and independent interpretation performed. Decision-making details documented in ED Course.    Risk  Prescription drug management.  Decision regarding hospitalization.        ED Course as of 02/23/25 1511   Sun Feb 23, 2025   1454 Spoke with Dr. Lindsay ear nose and throat on-call reviewed case and findings in the emergency department recommends IV Unasyn Decadron and admit to medicine service and will see for further evaluation and management.     1508 Spoke with hospitalist on-call Dr. Gramajo reviewed case and findings in the emergency department management thus far and ENT recommendations accepts for admission.         Medications   ampicillin-sulbactam (UNASYN) 3 g in sodium chloride 0.9 % 100 mL IVPB (3 g Intravenous New Bag 2/23/25 1507)   sodium chloride 0.9 % bolus 1,000 mL (0 mL Intravenous Stopped 2/23/25 1507)   dexamethasone (PF) (DECADRON) injection 10 mg (10 mg Intravenous Given 2/23/25 1314)   ketorolac (TORADOL) injection 30 mg (30 mg Intravenous Given  2/23/25 1314)   iohexol (OMNIPAQUE) 350 MG/ML injection (MULTI-DOSE) 85 mL (85 mL Intravenous Given 2/23/25 1349)       ED Risk Strat Scores                            SBIRT 20yo+      Flowsheet Row Most Recent Value   Initial Alcohol Screen: US AUDIT-C     1. How often do you have a drink containing alcohol? 0 Filed at: 02/23/2025 1248   2. How many drinks containing alcohol do you have on a typical day you are drinking?  0 Filed at: 02/23/2025 1248   3b. FEMALE Any Age, or MALE 65+: How often do you have 4 or more drinks on one occassion? 0 Filed at: 02/23/2025 1248   Audit-C Score 0 Filed at: 02/23/2025 1248   MARLENE: How many times in the past year have you...    Used an illegal drug or used a prescription medication for non-medical reasons? Never Filed at: 02/23/2025 1248                            History of Present Illness       Chief Complaint   Patient presents with    Neck Swelling     Pt states she has been sick for a week, called family doctor and was prescribed different medications. Woke up this AM with swelling on right side of neck making it difficult to swallow.        Past Medical History:   Diagnosis Date    Arthritis     Pt reports in right knee    Asthma     Bipolar 1 disorder (HCC)     Chronic pain disorder     Chronic pain in lower back.    COVID-19 03/2021    Pt reports having flu-like symptoms.    Depression     Disease of thyroid gland     Lipoma     Pt reports lump behind right ear    Nerve disorder     Pet allergy     Seasonal allergies       Past Surgical History:   Procedure Laterality Date    BACK SURGERY Left 2019    laminectomy    KNEE ARTHROSCOPY Right     KNEE ARTHROSCOPY W/ ACL RECONSTRUCTION      ORTHOPEDIC SURGERY      [place] ARTHROSCOPY KNEE SYNOVECTOMY 2/>COMPARTMENTS Right 09/16/2019    Procedure: ARTHROSCOPY KNEE  arthroscopy of right knee 1st, mini open right MPFL reconstruction with allograft;  Surgeon: Ced Gan MD;  Location: [facility];  Service: Orthopedics    [place]  "EXCISION TUMOR SOFT TISS FACE/SCALP SUBQ 2 CM/> Right 09/27/2021    Procedure: BEHIND EAR MASS EXCISION;  Surgeon: Princess Leal DO;  Location: [facility];  Service: General    REVISION OF SCAR Right 11/18/2019    Procedure: REVISION SCAR EXTREMITY;  Surgeon: Ced Gan MD;  Location: [facility];  Service: Orthopedics    WISDOM TOOTH EXTRACTION        Family History   Problem Relation Age of Onset    Pneumonia Mother     Multiple sclerosis Father       Social History     Tobacco Use    Smoking status: Former     Types: E-Cigarettes     Passive exposure: Past    Smokeless tobacco: Current    Tobacco comments:     vapes   Vaping Use    Vaping status: Every Day    Substances: Nicotine, THC, Flavoring   Substance Use Topics    Alcohol use: Not Currently    Drug use: Yes     Types: Marijuana     Comment: Pt uses daily for pain management      E-Cigarette/Vaping    E-Cigarette Use Current Every Day User     Comments Pt \"vapes\" daily       E-Cigarette/Vaping Substances    Nicotine Yes     THC Yes     CBD No     Flavoring Yes     Other No     Unknown No       I have reviewed and agree with the history as documented.     Patient is a 29-year-old female presents emergency department complaining of pain and swelling in the right side of the face extending into her right neck started yesterday patient reports being sick with cough cold symptoms for few days was recently prescribed a steroid and Tessalon Perles and inhaler.  Last evening noticed some small swelling in the right side of the face that extended and worsened into the right jaw and right upper neck today.  Denies any fevers reports some discomfort with swallowing.  Patient tolerating her secretions not drooling.        History provided by:  Patient      Review of Systems   Constitutional:  Positive for fever.   HENT:  Positive for congestion, facial swelling, postnasal drip, sore throat and trouble swallowing. Negative for voice change.    Respiratory:  " Positive for cough. Negative for shortness of breath.    Cardiovascular:  Negative for chest pain.   Gastrointestinal:  Negative for abdominal pain, nausea and vomiting.   All other systems reviewed and are negative.          Objective       ED Triage Vitals   Temperature Pulse Blood Pressure Respirations SpO2 Patient Position - Orthostatic VS   02/23/25 1249 02/23/25 1249 02/23/25 1249 02/23/25 1249 02/23/25 1249 02/23/25 1249   (!) 97.3 °F (36.3 °C) (!) 114 129/78 18 96 % Sitting      Temp Source Heart Rate Source BP Location FiO2 (%) Pain Score    02/23/25 1249 02/23/25 1249 02/23/25 1249 -- 02/23/25 1314    Temporal Monitor Right arm  7      Vitals      Date and Time Temp Pulse SpO2 Resp BP Pain Score FACES Pain Rating User   02/23/25 1345 -- -- -- -- -- 7 -- KM   02/23/25 1314 -- -- -- -- -- 7 -- MD   02/23/25 1249 97.3 °F (36.3 °C) 114 96 % 18 129/78 -- -- MB            Physical Exam  Vitals and nursing note reviewed.   Constitutional:       General: She is not in acute distress.     Appearance: Normal appearance.   HENT:      Head: Normocephalic and atraumatic.      Salivary Glands: Right salivary gland is diffusely enlarged and tender.      Nose: Nose normal.   Eyes:      Conjunctiva/sclera: Conjunctivae normal.   Pulmonary:      Effort: Pulmonary effort is normal. No respiratory distress.   Skin:     General: Skin is dry.   Neurological:      General: No focal deficit present.      Mental Status: She is alert and oriented to person, place, and time.         Results Reviewed       Procedure Component Value Units Date/Time    Procalcitonin [[id number]]  (Normal) Collected: 02/23/25 1301    Lab Status: Final result Specimen: Blood from Arm, Right Updated: 02/23/25 1335     Procalcitonin <0.05 ng/ml     Comprehensive metabolic panel [[id number]]  (Abnormal) Collected: 02/23/25 1301    Lab Status: Final result Specimen: Blood from Arm, Right Updated: 02/23/25 1329     Sodium 137 mmol/L      Potassium 4.5 mmol/L       Chloride 102 mmol/L      CO2 28 mmol/L      ANION GAP 7 mmol/L      BUN 11 mg/dL      Creatinine 0.81 mg/dL      Glucose 93 mg/dL      Calcium 9.1 mg/dL      AST 11 U/L      ALT 11 U/L      Alkaline Phosphatase 69 U/L      Total Protein 7.6 g/dL      Albumin 4.1 g/dL      Total Bilirubin 0.38 mg/dL      eGFR 98 ml/min/1.73sq m     Narrative:      [employer] Kid[employer] guidelines for Chronic Kidney Disease (CKD):     Stage 1 with normal or high GFR (GFR > 90 mL/min/1.73 square meters)    Stage 2 Mild CKD (GFR = 60-89 mL/min/1.73 square meters)    Stage 3A Moderate CKD (GFR = 45-59 mL/min/1.73 square meters)    Stage 3B Moderate CKD (GFR = 30-44 mL/min/1.73 square meters)    Stage 4 Severe CKD (GFR = 15-29 mL/min/1.73 square meters)    Stage 5 End Stage CKD (GFR <15 mL/min/1.73 square meters)  Note: GFR calculation is accurate only with a steady state creatinine    C-reactive protein [[id number]]  (Abnormal) Collected: 02/23/25 1301    Lab Status: Final result Specimen: Blood from Arm, Right Updated: 02/23/25 1329     CRP 20.0 mg/L     Lactic acid, plasma (w/reflex if result > 2.0) [[id number]]  (Normal) Collected: 02/23/25 1301    Lab Status: Final result Specimen: Blood from Arm, Right Updated: 02/23/25 1328     LACTIC ACID 0.8 mmol/L     Narrative:      Result may be elevated if tourniquet was used during collection.    Protime-INR [[id number]]  (Normal) Collected: 02/23/25 1301    Lab Status: Final result Specimen: Blood from Arm, Right Updated: 02/23/25 1328     Protime 12.8 seconds      INR 0.92    Narrative:      INR Therapeutic Range    Indication                                             INR Range      Atrial Fibrillation                                               2.0-3.0  Hypercoagulable State                                    2.0.2.3  Left Ventricular Asist Device                            2.0-3.0  Mechanical Heart Valve                                  -    Aortic(with afib, MI,  embolism, HF, LA enlargement,    and/or coagulopathy)                                     2.0-3.0 (2.5-3.5)     Mitral                                                             2.5-3.5  Prosthetic/Bioprosthetic Heart Valve               2.0-3.0  Venous thromboembolism (VTE: VT, PE        2.0-3.0    APTT [[id number]]  (Normal) Collected: 02/23/25 1301    Lab Status: Final result Specimen: Blood from Arm, Right Updated: 02/23/25 1328     PTT 26 seconds     Sedimentation rate, automated [[id number]]  (Normal) Collected: 02/23/25 1301    Lab Status: Final result Specimen: Blood from Arm, Right Updated: 02/23/25 1323     Sed Rate 15 mm/hour     Blood culture #1 [[id number]] Collected: 02/23/25 1305    Lab Status: In process Specimen: Blood from Arm, Left Updated: 02/23/25 1309    CBC and differential [[id number]]  (Abnormal) Collected: 02/23/25 1301    Lab Status: Final result Specimen: Blood from Arm, Right Updated: 02/23/25 1308     WBC 9.98 Thousand/uL      RBC 4.62 Million/uL      Hemoglobin 13.8 g/dL      Hematocrit 41.9 %      MCV 91 fL      MCH 29.9 pg      MCHC 32.9 g/dL      RDW 14.1 %      MPV 10.0 fL      Platelets 253 Thousands/uL      nRBC 0 /100 WBCs      Segmented % 78 %      Immature Grans % 1 %      Lymphocytes % 15 %      Monocytes % 3 %      Eosinophils Relative 2 %      Basophils Relative 1 %      Absolute Neutrophils 7.94 Thousands/µL      Absolute Immature Grans 0.05 Thousand/uL      Absolute Lymphocytes 1.50 Thousands/µL      Absolute Monocytes 0.27 Thousand/µL      Eosinophils Absolute 0.17 Thousand/µL      Basophils Absolute 0.05 Thousands/µL     Blood culture #2 [[id number]] Collected: 02/23/25 1301    Lab Status: In process Specimen: Blood from Arm, Right Updated: 02/23/25 1305            CT soft tissue neck   Final Interpretation by Andrae Grimm MD (02/23 1411)      Findings of acute right submandibular sialoadenitis. No obstructing calculi. No discrete abscess.      Reactive right cervical  adenopathy and cellulitic change in the facial and upper neck subcutaneous soft tissues, right worse than left.      Edematous appearing right aryepiglottic fold which may reflect a concomitant or reactive right supraglottitis. Recommend correlation with clinical exam.      Workstation performed: [id number]             ECG 12 Lead Documentation Only    Date/Time: 2/23/2025 1:08 PM    Performed by: Erick Holt DO  Authorized by: Erick Holt DO    ECG reviewed by me, the ED Provider: yes    Patient location:  ED  Previous ECG:     Comparison to cardiac monitor: Yes    Quality:     Tracing quality:  Limited by artifact  Rate:     ECG rate:  102    ECG rate assessment: tachycardic    Rhythm:     Rhythm: sinus tachycardia    QRS:     QRS axis:  Normal    QRS intervals:  Normal  Conduction:     Conduction: normal    ST segments:     ST segments:  Normal  T waves:     T waves: normal        ED Medication and Procedure Management   Prior to Admission Medications   Prescriptions Last Dose Informant Patient Reported? Taking?   Multiple Vitamin (multivitamin) capsule   Yes No   Sig: Take 1 capsule by mouth daily   QUEtiapine (SEROquel) 200 mg tablet  Self Yes No   Sig: Take 250 mg by mouth daily at bedtime    acetaminophen (TYLENOL) 500 mg tablet   Yes No   Sig: Take 1,000 mg by mouth every 6 (six) hours as needed for mild pain   albuterol (2.5 mg/3 mL) 0.083 % nebulizer solution   No No   Sig: Take 3 mL (2.5 mg total) by nebulization every 4 (four) hours as needed for wheezing or shortness of breath   albuterol (PROVENTIL HFA,VENTOLIN HFA) 90 mcg/act inhaler   Yes No   Sig: Inhale 2 puffs every 6 (six) hours as needed for wheezing   budesonide-formoterol (SYMBICORT) 80-4.5 MCG/ACT inhaler   Yes No   Sig: Inhale 2 puffs daily    citalopram (CeleXA) 20 mg tablet  Self Yes No   Sig: Take 40 mg by mouth daily    clonazePAM (KlonoPIN) 0.5 mg tablet   Yes No   Sig: Take 1 tablet by mouth 2 (two) times a day as needed    fluticasone (FLOVENT HFA) 110 MCG/ACT inhaler   Yes No   Sig: Inhale 2 puffs as needed Rinse mouth after use.   levothyroxine 112 mcg tablet   Yes No   Sig: Take 112 mcg by mouth daily   norgestimate-ethinyl estradiol (ORTHO-CYCLEN) 0.25-35 MG-MCG per tablet   Yes No   Sig: Take 1 tablet by mouth daily      Facility-Administered Medications: None     Patient's Medications   Discharge Prescriptions    No medications on file     No discharge procedures on file.  ED SEPSIS DOCUMENTATION   Time reflects when diagnosis was documented in both MDM as applicable and the Disposition within this note       Time User Action Codes Description Comment    2/23/2025  3:09 PM Erick Holt [K11.20] Sialoadenitis     2/23/2025  3:09 PM Erick Holt [L03.211] Facial cellulitis     2/23/2025  3:09 PM Erick Holt [J04.30] Supraglottitis                  Erick Holt DO  02/23/25 6461

## 2025-02-23 NOTE — ASSESSMENT & PLAN NOTE
Patient has a sore throat for the last week and also took some outpatient steroids as prescribed by urgent care for her throat.  Today she presented with increased redness swelling of the right side of her face and neck and some difficulty with swallowing.  CT soft tissue neck shows Findings of acute right submandibular sialoadenitis. No obstructing calculi. No discrete abscess.Reactive right cervical adenopathy and cellulitic change in the facial and upper neck subcutaneous soft tissues, right worse than left.Edematous appearing right aryepiglottic fold which may reflect a concomitant or reactive right supraglottitis. Recommend correlation with clinical exam.    Consult placed to ENT.  Will place on Unasyn, Decadron, warm soaks and see how she does.

## 2025-02-23 NOTE — H&P
H&P - Hospitalist   Name: Meka Leonardo 29 y.o. female I MRN: [id number]  Unit/Bed#: -01 I Date of Admission: 2/23/2025   Date of Service: 2/23/2025 I Hospital Day: 0     Assessment & Plan  Facial cellulitis  Patient has a sore throat for the last week and also took some outpatient steroids as prescribed by urgent care for her throat.  Today she presented with increased redness swelling of the right side of her face and neck and some difficulty with swallowing.  CT soft tissue neck shows Findings of acute right submandibular sialoadenitis. No obstructing calculi. No discrete abscess.Reactive right cervical adenopathy and cellulitic change in the facial and upper neck subcutaneous soft tissues, right worse than left.Edematous appearing right aryepiglottic fold which may reflect a concomitant or reactive right supraglottitis. Recommend correlation with clinical exam.    Consult placed to ENT.  Will place on Unasyn, Decadron, warm soaks and see how she does.  Sialoadenitis  Please see plan under facial cellulitis  Bipolar 1 disorder (HCC)  Currently stable.  Resume home meds      VTE Pharmacologic Prophylaxis: VTE Score: 0 Moderate Risk (Score 3-4) - Pharmacological DVT Prophylaxis Contraindicated. Sequential Compression Devices Ordered.  Code Status: Level 1 - Full Code as per patient  Discussion with family: none    Anticipated Length of Stay: Patient will be admitted on an inpatient basis with an anticipated length of stay of greater than 2 midnights secondary to facial cellulitis.    History of Present Illness   Chief Complaint: Redness and swelling of the face and neck    Meka Leonardo is a 29 y.o. female with a PMH of bipolar disorder who presents with 1 to 2 weeks history of sore throat and seen by family doctor and prescribed some steroids however despite that kept getting worse and noticed some increased swelling of the right of the neck that started yesterday and progressively got worse with  increased redness and now also having some difficulty swallowing.  Currently no fevers or chills reported    Review of Systems   Constitutional:  Positive for appetite change. Negative for chills, fatigue and fever.   HENT:  Positive for sore throat and trouble swallowing. Negative for hearing loss.    Eyes:  Negative for photophobia, discharge and visual disturbance.   Respiratory:  Negative for chest tightness and shortness of breath.    Cardiovascular:  Negative for chest pain and palpitations.   Gastrointestinal:  Negative for abdominal pain, blood in stool and vomiting.   Endocrine: Negative for polydipsia and polyuria.   Genitourinary:  Negative for difficulty urinating, dysuria, flank pain and hematuria.   Musculoskeletal:  Negative for back pain and gait problem.   Skin:  Negative for rash.   Allergic/Immunologic: Negative for environmental allergies and food allergies.   Neurological:  Negative for dizziness, seizures, syncope and headaches.   Hematological:  Does not bruise/bleed easily.   Psychiatric/Behavioral:  Negative for behavioral problems.    All other systems reviewed and are negative.      Historical Information   Past Medical History:   Diagnosis Date    Arthritis     Pt reports in right knee    Asthma     Bipolar 1 disorder (HCC)     Chronic pain disorder     Chronic pain in lower back.    COVID-19 03/2021    Pt reports having flu-like symptoms.    Depression     Disease of thyroid gland     Lipoma     Pt reports lump behind right ear    Nerve disorder     Pet allergy     Seasonal allergies      Past Surgical History:   Procedure Laterality Date    BACK SURGERY Left 2019    laminectomy    KNEE ARTHROSCOPY Right     KNEE ARTHROSCOPY W/ ACL RECONSTRUCTION      ORTHOPEDIC SURGERY      OK ARTHROSCOPY KNEE SYNOVECTOMY 2/>COMPARTMENTS Right 09/16/2019    Procedure: ARTHROSCOPY KNEE  arthroscopy of right knee 1st, mini open right MPFL reconstruction with allograft;  Surgeon: Ced Gan MD;   "Location: [facility];  Service: Orthopedics    CO EXCISION TUMOR SOFT TISS FACE/SCALP SUBQ 2 CM/> Right 09/27/2021    Procedure: BEHIND EAR MASS EXCISION;  Surgeon: Princess Leal DO;  Location:  MAIN OR;  Service: General    REVISION OF SCAR Right 11/18/2019    Procedure: REVISION SCAR EXTREMITY;  Surgeon: Ced Gan MD;  Location: [facility];  Service: Orthopedics    WISDOM TOOTH EXTRACTION       Social History     Tobacco Use    Smoking status: Former     Types: E-Cigarettes     Passive exposure: Past    Smokeless tobacco: Current    Tobacco comments:     vapes   Vaping Use    Vaping status: Every Day    Substances: Nicotine, THC, Flavoring   Substance and Sexual Activity    Alcohol use: Not Currently    Drug use: Yes     Types: Marijuana     Comment: Pt uses daily for pain management    Sexual activity: Not on file     Comment: did not ask     E-Cigarette/Vaping    E-Cigarette Use Current Every Day User     Comments Pt \"vapes\" daily      E-Cigarette/Vaping Substances    Nicotine Yes     THC Yes     CBD No     Flavoring Yes     Other No     Unknown No      Family History   Problem Relation Age of Onset    Pneumonia Mother     Multiple sclerosis Father      Social History:  Marital Status: Single     Meds/Allergies   I have reviewed home medications with patient personally.  Prior to Admission medications    Medication Sig Start Date End Date Taking? Authorizing Provider   budesonide-formoterol (SYMBICORT) 80-4.5 MCG/ACT inhaler Inhale 2 puffs daily  2/16/21  Yes Historical Provider, MD   citalopram (CeleXA) 20 mg tablet Take 40 mg by mouth daily    Yes Historical Provider, MD   levothyroxine 112 mcg tablet Take 112 mcg by mouth daily   Yes Historical Provider, MD   Multiple Vitamin (multivitamin) capsule Take 1 capsule by mouth daily   Yes Historical Provider, MD   norgestimate-ethinyl estradiol (ORTHO-CYCLEN) 0.25-35 MG-MCG per tablet Take 1 tablet by mouth daily   Yes Historical Provider, MD "   QUEtiapine (SEROquel) 200 mg tablet Take 100 mg by mouth daily at bedtime   Yes Historical Provider, MD   acetaminophen (TYLENOL) 500 mg tablet Take 1,000 mg by mouth every 6 (six) hours as needed for mild pain    Historical Provider, MD   albuterol (2.5 mg/3 mL) 0.083 % nebulizer solution Take 3 mL (2.5 mg total) by nebulization every 4 (four) hours as needed for wheezing or shortness of breath 1/15/25   Eyal Rudolph PA-C   albuterol (PROVENTIL HFA,VENTOLIN HFA) 90 mcg/act inhaler Inhale 2 puffs every 6 (six) hours as needed for wheezing    Historical Provider, MD   clonazePAM (KlonoPIN) 0.5 mg tablet Take 1 tablet by mouth 2 (two) times a day as needed    Historical Provider, MD   fluticasone (FLOVENT HFA) 110 MCG/ACT inhaler Inhale 2 puffs as needed Rinse mouth after use.  Patient not taking: Reported on 2/23/2025 2/23/25  Historical Provider, MD     Allergies   Allergen Reactions    Fluoxetine GI Intolerance     GI upset        Objective :  Temp:  [97 °F (36.1 °C)-97.3 °F (36.3 °C)] 97 °F (36.1 °C)  HR:  [] 86  BP: (116-129)/(72-78) 116/72  Resp:  [18] 18  SpO2:  [95 %-96 %] 95 %  O2 Device: None (Room air)    Physical Exam  Vitals and nursing note reviewed.   Constitutional:       Appearance: She is ill-appearing.   HENT:      Head: Normocephalic and atraumatic.      Right Ear: External ear normal.      Left Ear: External ear normal.      Nose: Nose normal.      Mouth/Throat:      Pharynx: Oropharynx is clear.      Comments: No erythema posterior pharynx or bilateral TM noted  Eyes:      Pupils: Pupils are equal, round, and reactive to light.   Neck:      Comments: Mildred and swelling on the right cheek extending down into the neck and asked excessive swelling noted around the right neck area starting from below the mandible.  Cardiovascular:      Rate and Rhythm: Normal rate and regular rhythm.      Heart sounds: Normal heart sounds.   Pulmonary:      Effort: Pulmonary effort is normal.      Breath  sounds: Normal breath sounds.   Abdominal:      General: Bowel sounds are normal.      Palpations: Abdomen is soft.      Tenderness: There is no abdominal tenderness.   Musculoskeletal:         General: Normal range of motion.      Cervical back: Normal range of motion and neck supple.   Skin:     General: Skin is warm and dry.      Capillary Refill: Capillary refill takes less than 2 seconds.   Neurological:      General: No focal deficit present.      Mental Status: She is alert and oriented to person, place, and time.   Psychiatric:         Mood and Affect: Mood normal.            Lines/Drains:            Lab Results: I have reviewed the following results:  Results from last 7 days   Lab Units 02/23/25  1301   WBC Thousand/uL 9.98   HEMOGLOBIN g/dL 13.8   HEMATOCRIT % 41.9   PLATELETS Thousands/uL 253   SEGS PCT % 78*   LYMPHO PCT % 15   MONO PCT % 3*   EOS PCT % 2     Results from last 7 days   Lab Units 02/23/25  1301   SODIUM mmol/L 137   POTASSIUM mmol/L 4.5   CHLORIDE mmol/L 102   CO2 mmol/L 28   BUN mg/dL 11   CREATININE mg/dL 0.81   ANION GAP mmol/L 7   CALCIUM mg/dL 9.1   ALBUMIN g/dL 4.1   TOTAL BILIRUBIN mg/dL 0.38   ALK PHOS U/L 69   ALT U/L 11   AST U/L 11*   GLUCOSE RANDOM mg/dL 93     Results from last 7 days   Lab Units 02/23/25  1301   INR  0.92         Lab Results   Component Value Date    HGBA1C 5.2 10/25/2024    HGBA1C 5.4 11/23/2022     Results from last 7 days   Lab Units 02/23/25  1301   LACTIC ACID mmol/L 0.8   PROCALCITONIN ng/ml <0.05       Imaging Results Review: I reviewed radiology reports from this admission including: CT soft tissue neck.  Other Study Results Review: EKG was reviewed.     Administrative Statements       ** Please Note: This note has been constructed using a voice recognition system. **

## 2025-02-23 NOTE — PLAN OF CARE
Problem: PAIN - ADULT  Goal: Verbalizes/displays adequate comfort level or baseline comfort level  Description: Interventions:  - Encourage patient to monitor pain and request assistance  - Assess pain using appropriate pain scale  - Administer analgesics based on type and severity of pain and evaluate response  - Implement non-pharmacological measures as appropriate and evaluate response  - Consider cultural and social influences on pain and pain management  - Notify physician/advanced practitioner if interventions unsuccessful or patient reports new pain  Outcome: Progressing     Problem: INFECTION - ADULT  Goal: Absence or prevention of progression during hospitalization  Description: INTERVENTIONS:  - Assess and monitor for signs and symptoms of infection  - Monitor lab/diagnostic results  - Monitor all insertion sites, i.e. indwelling lines, tubes, and drains  - Monitor endotracheal if appropriate and nasal secretions for changes in amount and color  - [place] appropriate cooling/warming therapies per order  - Administer medications as ordered  - Instruct and encourage patient and family to use good hand hygiene technique  - Identify and instruct in appropriate isolation precautions for identified infection/condition  Outcome: Progressing  Goal: Absence of fever/infection during neutropenic period  Description: INTERVENTIONS:  - Monitor WBC    Outcome: Progressing     Problem: NEUROSENSORY - ADULT  Goal: Achieves stable or improved neurological status  Description: INTERVENTIONS  - Monitor and report changes in neurological status  - Monitor vital signs such as temperature, blood pressure, glucose, and any other labs ordered   - Initiate measures to prevent increased intracranial pressure  - Monitor for seizure activity and implement precautions if appropriate      Outcome: Progressing     Problem: RESPIRATORY - ADULT  Goal: Achieves optimal ventilation and oxygenation  Description: INTERVENTIONS:  - Assess  for changes in respiratory status  - Assess for changes in mentation and behavior  - Position to facilitate oxygenation and minimize respiratory effort  - Oxygen administered by appropriate delivery if ordered  - Initiate smoking cessation education as indicated  - Encourage broncho-pulmonary hygiene including cough, deep breathe, Incentive Spirometry  - Assess the need for suctioning and aspirate as needed  - Assess and instruct to report SOB or any respiratory difficulty  - Respiratory Therapy support as indicated  Outcome: Progressing     Problem: GASTROINTESTINAL - ADULT  Goal: Maintains adequate nutritional intake  Description: INTERVENTIONS:  - Monitor percentage of each meal consumed  - Identify factors contributing to decreased intake, treat as appropriate  - Assist with meals as needed  - Monitor I&O, weight, and lab values if indicated  - Obtain nutrition services referral as needed  Outcome: Progressing

## 2025-02-23 NOTE — CONSULTS
History of Present Illness   Meka Leonardo is a 29 y.o. female who presents with a several day history of progressive right sided facial swelling and pain, affecting her swallowing.  She has no prior surgical history in the head neck does not complain of dry eyes or mouth.  She has no hoarseness, aspiration, but does have dysphagia to solids greater than liquids.  Seen in the ER where CT scan showed sialadenitis of submandibular gland with cervical adenitis and some swelling of the AE fold but no discrete stones are noted.  No abscesses noted.  This was reviewed in detail and discussed with the patient.  No prior history of episodes like this.    Review of Systems  Unremarkable    Objective :  Temp:  [97 °F (36.1 °C)-97.3 °F (36.3 °C)] 97 °F (36.1 °C)  HR:  [] 86  BP: (116-129)/(72-78) 116/72  Resp:  [18] 18  SpO2:  [95 %-96 %] 95 %  O2 Device: None (Room air)    Physical Exam   Ears: TMs are gray mobile without effusion.    Nasal: Septum slightly deviated turbinates are well reduced and there is no mucopurulence.    Oral cavity/oropharynx: Tenderness in the right floor of mouth on bimanual palpation with heterogeneity of the apex of the submandibular gland and discrete tenderness.  No stones palpated and no edema of Tha's duct.    NPL: True vocal folds are mobile.  No AE fold or lateral pharyngeal edema is appreciated.  The airway is patent.    Neck: 6 cm right-sided enlargement of the right submandibular area in continuity with level 2 where heterogeneous adenopathy is palpated and very tender.        Lab Results: I have reviewed the following results:  Results from last 7 days   Lab Units 02/23/25  1301   WBC Thousand/uL 9.98   HEMOGLOBIN g/dL 13.8   HEMATOCRIT % 41.9   PLATELETS Thousands/uL 253     Results from last 7 days   Lab Units 02/23/25  1301   POTASSIUM mmol/L 4.5   CHLORIDE mmol/L 102   CO2 mmol/L 28   BUN mg/dL 11   CREATININE mg/dL 0.81   CALCIUM mg/dL 9.1     Results from last 7 days    Lab Units 02/23/25  1301   INR  0.92   PTT seconds 26       Imaging Results Review: I personally reviewed the following image studies in PACS and associated radiology reports: CT neck. My interpretation of the radiology images/reports is: Submandibular cellulitis and cervical adenitis without discrete abscess or calcifications..  I spent 55 minutes in chart review, consultation, radiologic review, history and physical and assessment of plan with this patient.    Assessment/plan: I have recommended Unasyn and Decadron be given intravenously and the patient may be advanced to a regular diet.  I will reexamine her tomorrow morning but I would expect 24 to 48 hours to achieve improvement before p.o. therapy might be considered.  Sialagogues such as lemon wedges to be placed at her bedside to bite into every 3-4 hours while awake and warm compresses applied to her right side of the face for 30 minutes every 3 hours while awake.  IV hydration according to her weight.  There is no role for surgery in the treatment at this point but I will remain involved.

## 2025-02-24 LAB
ANION GAP SERPL CALCULATED.3IONS-SCNC: 8 MMOL/L (ref 4–13)
ATRIAL RATE: 102 BPM
BUN SERPL-MCNC: 10 MG/DL (ref 5–25)
CALCIUM SERPL-MCNC: 8.8 MG/DL (ref 8.4–10.2)
CHLORIDE SERPL-SCNC: 104 MMOL/L (ref 96–108)
CO2 SERPL-SCNC: 24 MMOL/L (ref 21–32)
CREAT SERPL-MCNC: 0.68 MG/DL (ref 0.6–1.3)
ERYTHROCYTE [DISTWIDTH] IN BLOOD BY AUTOMATED COUNT: 13.8 % (ref 11.6–15.1)
GFR SERPL CREATININE-BSD FRML MDRD: 118 ML/MIN/1.73SQ M
GLUCOSE SERPL-MCNC: 138 MG/DL (ref 65–140)
HCT VFR BLD AUTO: 40.6 % (ref 34.8–46.1)
HGB BLD-MCNC: 13.5 G/DL (ref 11.5–15.4)
MCH RBC QN AUTO: 30.6 PG (ref 26.8–34.3)
MCHC RBC AUTO-ENTMCNC: 33.3 G/DL (ref 31.4–37.4)
MCV RBC AUTO: 92 FL (ref 82–98)
P AXIS: 9 DEGREES
PLATELET # BLD AUTO: 231 THOUSANDS/UL (ref 149–390)
PMV BLD AUTO: 10.3 FL (ref 8.9–12.7)
POTASSIUM SERPL-SCNC: 3.9 MMOL/L (ref 3.5–5.3)
PR INTERVAL: 122 MS
QRS AXIS: 31 DEGREES
QRSD INTERVAL: 62 MS
QT INTERVAL: 330 MS
QTC INTERVAL: 430 MS
RBC # BLD AUTO: 4.41 MILLION/UL (ref 3.81–5.12)
SODIUM SERPL-SCNC: 136 MMOL/L (ref 135–147)
T WAVE AXIS: 35 DEGREES
TSH SERPL DL<=0.05 MIU/L-ACNC: 2.35 UIU/ML (ref 0.45–4.5)
VENTRICULAR RATE: 102 BPM
WBC # BLD AUTO: 9.4 THOUSAND/UL (ref 4.31–10.16)

## 2025-02-24 PROCEDURE — 80048 BASIC METABOLIC PNL TOTAL CA: CPT | Performed by: FAMILY MEDICINE

## 2025-02-24 PROCEDURE — 93010 ELECTROCARDIOGRAM REPORT: CPT | Performed by: INTERNAL MEDICINE

## 2025-02-24 PROCEDURE — 84443 ASSAY THYROID STIM HORMONE: CPT | Performed by: FAMILY MEDICINE

## 2025-02-24 PROCEDURE — 85027 COMPLETE CBC AUTOMATED: CPT | Performed by: FAMILY MEDICINE

## 2025-02-24 PROCEDURE — 99232 SBSQ HOSP IP/OBS MODERATE 35: CPT | Performed by: FAMILY MEDICINE

## 2025-02-24 RX ORDER — CALCIUM CARBONATE 500 MG/1
500 TABLET, CHEWABLE ORAL DAILY PRN
Status: DISCONTINUED | OUTPATIENT
Start: 2025-02-24 | End: 2025-02-25 | Stop reason: HOSPADM

## 2025-02-24 RX ADMIN — AMPICILLIN SODIUM AND SULBACTAM SODIUM 3 G: 10; 5 INJECTION, POWDER, FOR SOLUTION INTRAMUSCULAR; INTRAVENOUS at 21:25

## 2025-02-24 RX ADMIN — DEXAMETHASONE SODIUM PHOSPHATE 10 MG: 10 INJECTION, SOLUTION INTRAMUSCULAR; INTRAVENOUS at 13:40

## 2025-02-24 RX ADMIN — DEXAMETHASONE SODIUM PHOSPHATE 10 MG: 10 INJECTION, SOLUTION INTRAMUSCULAR; INTRAVENOUS at 07:40

## 2025-02-24 RX ADMIN — CALCIUM CARBONATE (ANTACID) CHEW TAB 500 MG 500 MG: 500 CHEW TAB at 06:05

## 2025-02-24 RX ADMIN — QUETIAPINE FUMARATE 100 MG: 100 TABLET ORAL at 21:25

## 2025-02-24 RX ADMIN — AMPICILLIN SODIUM AND SULBACTAM SODIUM 3 G: 10; 5 INJECTION, POWDER, FOR SOLUTION INTRAMUSCULAR; INTRAVENOUS at 03:51

## 2025-02-24 RX ADMIN — DEXAMETHASONE SODIUM PHOSPHATE 10 MG: 10 INJECTION, SOLUTION INTRAMUSCULAR; INTRAVENOUS at 00:23

## 2025-02-24 RX ADMIN — CITALOPRAM HYDROBROMIDE 40 MG: 20 TABLET ORAL at 07:34

## 2025-02-24 RX ADMIN — LEVOTHYROXINE SODIUM 112 MCG: 112 TABLET ORAL at 07:34

## 2025-02-24 RX ADMIN — MORPHINE SULFATE 2 MG: 2 INJECTION, SOLUTION INTRAMUSCULAR; INTRAVENOUS at 06:07

## 2025-02-24 RX ADMIN — AMPICILLIN SODIUM AND SULBACTAM SODIUM 3 G: 10; 5 INJECTION, POWDER, FOR SOLUTION INTRAMUSCULAR; INTRAVENOUS at 13:39

## 2025-02-24 RX ADMIN — AMPICILLIN SODIUM AND SULBACTAM SODIUM 3 G: 10; 5 INJECTION, POWDER, FOR SOLUTION INTRAMUSCULAR; INTRAVENOUS at 09:45

## 2025-02-24 RX ADMIN — MORPHINE SULFATE 4 MG: 4 INJECTION INTRAVENOUS at 13:40

## 2025-02-24 RX ADMIN — BUDESONIDE AND FORMOTEROL FUMARATE DIHYDRATE 2 PUFF: 80; 4.5 AEROSOL RESPIRATORY (INHALATION) at 07:43

## 2025-02-24 RX ADMIN — Medication 1 TABLET: at 07:33

## 2025-02-24 NOTE — UTILIZATION REVIEW
NOTIFICATION OF INPATIENT ADMISSION   AUTHORIZATION REQUEST   SERVICING FACILITY:   [place] [address]  Tax ID: [id number]  NPI: [id number] ATTENDING PROVIDER:  Attending Name and NPI#: Priscilla Gramajo Md [[id number]]  Address: [address] [place] [address]  Phone: [phone]   ADMISSION INFORMATION:  Place of Service: Inpatient [facility]  Place of Service Code: 21  Inpatient Admission Date/Time: 2/23/25  3:08 PM  Discharge Date/Time: No discharge date for patient encounter.  Admitting Diagnosis Code/Description:  Sialoadenitis [K11.20]  Supraglottitis [J04.30]  Neck swelling [R22.1]  Facial cellulitis [L03.211]     UTILIZATION REVIEW CONTACT:  Dana Carver, Utilization   Network Utilization Review Department  Phone: [phone]  Fax [phone]  Email: [email or web address]  Contact for approvals/pending authorizations, clinical reviews, and discharge.     PHYSICIAN ADVISORY SERVICES:  Medical Necessity Denial & Kcoc-oz-Etrn Review  Phone: [phone]  Fax: [phone]  Email: [email or web address]     DISCHARGE SUPPORT TEAM:  For Patients Discharge Needs & Updates  Phone: [phone] opt. 2 Fax: [phone]  Email: [email or web address]

## 2025-02-24 NOTE — UTILIZATION REVIEW
Initial Clinical Review    Admission: Date/Time/Statement:   Admission Orders (From admission, onward)       Ordered        02/23/25 1507  INPATIENT ADMISSION  Once                          Orders Placed This Encounter   Procedures    INPATIENT ADMISSION     Standing Status:   Standing     Number of Occurrences:   1     Level of Care:   Med Surg [16]     Estimated length of stay:   More than 2 Midnights     Certification:   I certify that inpatient services are medically necessary for this patient for a duration of greater than two midnights. See H&P and MD Progress Notes for additional information about the patient's course of treatment.     ED Arrival Information       Expected   -    Arrival   2/23/2025 12:37    Acuity   Urgent              Means of arrival   Walk-In    Escorted by   Self    Service   Hospitalist    Admission type   Emergency              Arrival complaint   neck swelling, difficulty swallowing             Chief Complaint   Patient presents with    Neck Swelling     Pt states she has been sick for a week, called family doctor and was prescribed different medications. Woke up this AM with swelling on right side of neck making it difficult to swallow.        Initial Presentation: 29 y.o. female to ED via walk-in from home  Present to ED with increased swelling of the right of the neck with worsening redness and having difficulty swallowing. 1 to 2 weeks history of sore throat and seen by family doctor and prescribed some steroids however despite that kept getting worse   PMHX bipolar disorder; Asthma  Admitted to [facility] with DX: Facial cellulitis   on exam: tachy; Mildred and swelling on the right cheek extending down into the neck and asked excessive swelling noted around the right neck area starting from below the mandible. Pain 7/10  CT Findings of acute right submandibular sialoadenitis. Reactive right cervical adenopathy and cellulitic change in the facial and upper neck subcutaneous soft tissues,  right worse than left.   Edematous appearing right aryepiglottic fold which may reflect a concomitant or reactive right supraglottitis.   PLAN: cont iv abx; cont iv decadron; monitor labs; pain control (see below); ENT consulted      Anticipated Length of Stay/Certification Statement: Patient will be admitted on an inpatient basis with an anticipated length of stay of greater than 2 midnights secondary to facial cellulitis.       ENT CONSULT   presents with a several day history of progressive right sided facial swelling and pain, affecting her swallowing.  She has no prior surgical history in the head neck does not complain of dry eyes or mouth.  She has no hoarseness, aspiration, but does have dysphagia to solids greater than liquids.  Seen in the ER where CT scan showed sialadenitis of submandibular gland with cervical adenitis and some swelling of the AE fold but no discrete stones are noted.  No abscesses noted.    Physical Exam   Nasal: Septum slightly deviated turbinates are well reduced and there is no mucopurulence. Oral cavity/oropharynx: Tenderness in the right floor of mouth on bimanual palpation with heterogeneity of the apex of the submandibular gland and discrete tenderness.  No stones palpated and no edema of Gallia's duct. NPL: True vocal folds are mobile.  No AE fold or lateral pharyngeal edema is appreciated.  The airway is patent.  Neck: 6 cm right-sided enlargement of the right submandibular area in continuity with level 2 where heterogeneous adenopathy is palpated and very tender.  Plan: Recommended Unasyn and Decadron be given intravenously and the patient may be advanced to a regular diet. Sialagogues such as lemon wedges to be placed at her bedside to bite into every 3-4 hours while awake and warm compresses applied to her right side of the face for 30 minutes every 3 hours while awake.     Date: 2/24/25     Day 2   Patient still with pain on the right side but tolerating a diet and not  complaining of hoarseness. Swallowing has become easier.   Per ENT: Status post right submandibular sialadenitis with about 40% improvement on IV antibiotics and steroids, sialagogues and warm compresses.  Would recommend 24 hours more of IV antibiotics and steroids switched to p.o.   Plan: cont iv abx; cont iv decadron; monitor labs; pain control (see below)      ED Treatment-Medication Administration from 02/23/2025 1236 to 02/23/2025 1555         Date/Time Order Dose Route Action     02/23/2025 1313 sodium chloride 0.9 % bolus 1,000 mL 1,000 mL Intravenous New Bag     02/23/2025 1314 dexamethasone (PF) (DECADRON) injection 10 mg 10 mg Intravenous Given     02/23/2025 1314 ketorolac (TORADOL) injection 30 mg 30 mg Intravenous Given     02/23/2025 1349 iohexol (OMNIPAQUE) 350 MG/ML injection (MULTI-DOSE) 85 mL 85 mL Intravenous Given     02/23/2025 1507 ampicillin-sulbactam (UNASYN) 3 g in sodium chloride 0.9 % 100 mL IVPB 3 g Intravenous New Bag            Scheduled Medications:  ampicillin-sulbactam, 3 g, Intravenous, Q6H  budesonide-formoterol, 2 puff, Inhalation, Daily  citalopram, 40 mg, Oral, Daily  dexamethasone, 10 mg, Intravenous, Q8H LACEY  levothyroxine, 112 mcg, Oral, Daily  multivitamin-minerals, 1 tablet, Oral, Daily  nicotine, 1 patch, Transdermal, Daily  QUEtiapine, 100 mg, Oral, HS      Continuous IV Infusions: None       PRN Meds:  acetaminophen, 650 mg, Oral, Q6H PRN  albuterol, 2 puff, Inhalation, Q6H PRN  calcium carbonate, 500 mg, Oral, Daily PRN  clonazePAM, 0.5 mg, Oral, BID PRN  morphine injection, 2 mg, Intravenous, Q4H PRN  (2/24 recd x1 so far today)  morphine injection, 4 mg, Intravenous, Q4H PRN  ondansetron, 4 mg, Intravenous, Q6H PRN  polyethylene glycol, 17 g, Oral, Daily PRN      ED Triage Vitals   Temperature Pulse Respirations Blood Pressure SpO2 Pain Score   02/23/25 1249 02/23/25 1249 02/23/25 1249 02/23/25 1249 02/23/25 1249 02/23/25 1314   (!) 97.3 °F (36.3 °C) (!) 114 18  129/78 96 % 7       Vital Signs (last 3 days)       Date/Time Temp Pulse Resp BP MAP (mmHg) SpO2 O2 Device Patient Position - Orthostatic VS Pain    02/24/25 0957 -- -- -- -- -- 93 % None (Room air) -- No Pain    02/24/25 0846 -- -- -- -- -- -- None (Room air) -- 3    02/24/25 0738 97.6 °F (36.4 °C) 85 16 127/79 95 -- -- Sitting --    02/24/25 0607 -- -- -- -- -- -- -- -- 6    02/23/25 22:39:41 97.3 °F (36.3 °C) 97 17 101/48 66 94 % -- -- --    02/23/25 2048 -- -- -- -- -- 95 % None (Room air) -- 3    02/23/25 16:06:14 97 °F (36.1 °C) 86 18 116/72 87 95 % None (Room air) -- --    02/23/25 1557 -- -- -- -- -- -- -- -- 5    02/23/25 1345 -- -- -- -- -- -- -- -- 7    02/23/25 1314 -- -- -- -- -- -- -- -- 7    02/23/25 1249 97.3 °F (36.3 °C) 114 18 129/78 -- 96 % None (Room air) Sitting --              Pertinent Labs/Diagnostic Test Results:   Radiology:  CT soft tissue neck   Final Interpretation by Andrae Grimm MD (02/23 1412)      Findings of acute right submandibular sialoadenitis. No obstructing calculi. No discrete abscess.      Reactive right cervical adenopathy and cellulitic change in the facial and upper neck subcutaneous soft tissues, right worse than left.      Edematous appearing right aryepiglottic fold which may reflect a concomitant or reactive right supraglottitis. Recommend correlation with clinical exam.      Workstation performed: [id number]           Cardiology:  ECG 12 lead   Final Result by Caden Gonsales MD (02/24 6171)   Sinus tachycardia   Otherwise normal ECG   No previous ECGs available   Confirmed by Caden Gonsales ([id number]) on 2/24/2025 7:50:26 AM           Results from last 7 days   Lab Units 02/24/25  0451 02/23/25  1301   WBC Thousand/uL 9.40 9.98   HEMOGLOBIN g/dL 13.5 13.8   HEMATOCRIT % 40.6 41.9   PLATELETS Thousands/uL 231 253   TOTAL NEUT ABS Thousands/µL  --  7.94*        Results from last 7 days   Lab Units 02/24/25  0451 02/23/25  1301   SODIUM mmol/L 136 137   POTASSIUM mmol/L  3.9 4.5   CHLORIDE mmol/L 104 102   CO2 mmol/L 24 28   ANION GAP mmol/L 8 7   BUN mg/dL 10 11   CREATININE mg/dL 0.68 0.81   EGFR ml/min/1.73sq m 118 98   CALCIUM mg/dL 8.8 9.1     Results from last 7 days   Lab Units 02/23/25  1301   AST U/L 11*   ALT U/L 11   ALK PHOS U/L 69   TOTAL PROTEIN g/dL 7.6   ALBUMIN g/dL 4.1   TOTAL BILIRUBIN mg/dL 0.38        Results from last 7 days   Lab Units 02/24/25  0451 02/23/25  1301   GLUCOSE RANDOM mg/dL 138 93        Results from last 7 days   Lab Units 02/23/25  1301   PROTIME seconds 12.8   INR  0.92   PTT seconds 26     Results from last 7 days   Lab Units 02/24/25  0451   TSH 3RD GENERATON uIU/mL 2.355     Results from last 7 days   Lab Units 02/23/25  1301   PROCALCITONIN ng/ml <0.05     Results from last 7 days   Lab Units 02/23/25  1301   LACTIC ACID mmol/L 0.8        Results from last 7 days   Lab Units 02/23/25  1301   CRP mg/L 20.0*   SED RATE mm/hour 15        Results from last 7 days   Lab Units 02/23/25  1305 02/23/25  1301   BLOOD CULTURE  Received in Microbiology Lab. Culture in Progress. Received in Microbiology Lab. Culture in Progress.          Past Medical History:   Diagnosis Date    Arthritis     Pt reports in right knee    Asthma     Bipolar 1 disorder (HCC)     Chronic pain disorder     Chronic pain in lower back.    COVID-19 03/2021    Pt reports having flu-like symptoms.    Depression     Disease of thyroid gland     Lipoma     Pt reports lump behind right ear    Nerve disorder     Pet allergy     Seasonal allergies      Admitting Diagnosis: Sialoadenitis [K11.20]  Supraglottitis [J04.30]  Neck swelling [R22.1]  Facial cellulitis [L03.211]  Age/Sex: 29 y.o. female    Network Utilization Review Department  ATTENTION: Please call with any questions or concerns to [phone] and carefully listen to the prompts so that you are directed to the right person. All voicemails are confidential.   For Discharge needs, contact Care Management DC Support Team at  [phone] opt. [phone]  Send all requests for admission clinical reviews, approved or denied determinations and any other requests to dedicated fax number below belonging to the campus where the patient is receiving treatment. List of dedicated fax numbers for the Facilities:  FACILITY NAME UR FAX NUMBER   ADMISSION DENIALS (Administrative/Medical Necessity) [phone]   DISCHARGE SUPPORT TEAM (NETWORK) [phone]   PARENT CHILD HEALTH (Maternity/NICU/Pediatrics) [phone]   [facility] [phone]   [facility] [phone]   [facility] [phone]   [facility] [phone]   [facility] [phone]   [facility] [phone]   [facility] [phone]   [facility] [phone]   [facility] [phone]   [facility] [phone]   [facility] [phone]   [facility] [phone]

## 2025-02-24 NOTE — PROGRESS NOTES
Progress Note - ENT   Name: Meka Leonardo 29 y.o. female I MRN: [id number]  Unit/Bed#: -01 I Date of Admission: 2/23/2025   Date of Service: 2/24/2025 I Hospital Day: 1     Assessment & Plan  Facial cellulitis    Sialoadenitis    Bipolar 1 disorder (HCC)    Status post right submandibular sialadenitis with about 40% improvement on IV antibiotics and steroids, sialagogues and warm compresses.  Would recommend 24 hours more of IV antibiotics and steroids switched to p.o. tomorrow with discharge and follow-up on Friday as an outpatient.  24 Hour Events : None  Subjective : Patient still with pain on the right side but tolerating a diet and not complaining of hoarseness.  Swallowing has become easier.    Objective :  Temp:  [97 °F (36.1 °C)-97.6 °F (36.4 °C)] 97.6 °F (36.4 °C)  HR:  [] 85  BP: (101-129)/(48-79) 127/79  Resp:  [16-18] 16  SpO2:  [94 %-96 %] 94 %  O2 Device: None (Room air)    Physical Exam    40% reduction in the submandibular gland and adenitis on the right side of the face.  Floor of the mouth demonstrates improvement in the edema and induration around Kidder's duct at the apex of the submandibular gland which is now soft.

## 2025-02-24 NOTE — PROGRESS NOTES
Progress Note - Hospitalist   Name: Meka Leonardo 29 y.o. female I MRN: [id number]  Unit/Bed#: -01 I Date of Admission: 2/23/2025   Date of Service: 2/24/2025 I Hospital Day: 1    Assessment & Plan  Facial cellulitis  Patient has a sore throat for the last week and also took some outpatient steroids as prescribed by urgent care for her throat.  Today she presented with increased redness swelling of the right side of her face and neck and some difficulty with swallowing.  CT soft tissue neck shows Findings of acute right submandibular sialoadenitis. No obstructing calculi. No discrete abscess.Reactive right cervical adenopathy and cellulitic change in the facial and upper neck subcutaneous soft tissues, right worse than left.Edematous appearing right aryepiglottic fold which may reflect a concomitant or reactive right supraglottitis. Recommend correlation with clinical exam.    Consult placed to ENT.  Will place on Unasyn, Decadron, warm soaks and see how she does.  Some swelling is slowly improving however still quite significant swelling on the entire side of the face and neck.  Will switch to oral steroids tomorrow and discharge home tomorrow.  Appreciate ENT input  Sialoadenitis  Please see plan under facial cellulitis  Bipolar 1 disorder (HCC)  Currently stable.  Resume home meds    VTE Pharmacologic Prophylaxis: VTE Score: 0 Moderate Risk (Score 3-4) - Pharmacological DVT Prophylaxis Contraindicated. Sequential Compression Devices Ordered.    Mobility:   Basic Mobility Inpatient Raw Score: 24  JH-HLM Goal: 8: Walk 250 feet or more  JH-HLM Achieved: 8: Walk 250 feet ot more  JH-HLM Goal achieved. Continue to encourage appropriate mobility.    Patient Centered Rounds: I performed bedside rounds with nursing staff today.   Discussions with Specialists or Other Care Team Provider: fernando ent    Education and Discussions with Family / Patient: fernando patient at bedside    Current Length of Stay: 1 day(s)  Current  Patient Status: Inpatient   Certification Statement: The patient will continue to require additional inpatient hospital stay due to rt facial cellulitis  Discharge Plan: Anticipate discharge tomorrow to home.    Code Status: Level 1 - Full Code    Subjective   Patient feels a little bit better with some decreased redness and swelling noted on the right side of his face and neck. still Has considerable swelling and pain noted of the face and neck    Objective :  Temp:  [97 °F (36.1 °C)-97.6 °F (36.4 °C)] 97.6 °F (36.4 °C)  HR:  [85-97] 85  BP: (101-127)/(48-79) 127/79  Resp:  [16-18] 16  SpO2:  [93 %-95 %] 93 %  O2 Device: None (Room air)    There is no height or weight on file to calculate BMI.     Input and Output Summary (last 24 hours):     Intake/Output Summary (Last 24 hours) at 2/24/2025 1255  Last data filed at 2/24/2025 1215  Gross per 24 hour   Intake 480 ml   Output 850 ml   Net -370 ml       Physical Exam  Vitals and nursing note reviewed.   Constitutional:       Appearance: Normal appearance.   HENT:      Head: Normocephalic and atraumatic.      Right Ear: External ear normal.      Left Ear: External ear normal.      Nose: Nose normal.      Mouth/Throat:      Pharynx: Oropharynx is clear.   Eyes:      Pupils: Pupils are equal, round, and reactive to light.   Neck:      Comments: Redness and swelling of the right side of the face and neck  Cardiovascular:      Rate and Rhythm: Normal rate and regular rhythm.      Heart sounds: Normal heart sounds.   Pulmonary:      Effort: Pulmonary effort is normal.      Breath sounds: Normal breath sounds.   Abdominal:      General: Bowel sounds are normal.      Palpations: Abdomen is soft.      Tenderness: There is no abdominal tenderness.   Musculoskeletal:         General: Normal range of motion.      Cervical back: Normal range of motion and neck supple.   Skin:     General: Skin is warm and dry.      Capillary Refill: Capillary refill takes less than 2 seconds.    Neurological:      General: No focal deficit present.      Mental Status: She is alert and oriented to person, place, and time.   Psychiatric:         Mood and Affect: Mood normal.           Lines/Drains:              Lab Results: I have reviewed the following results:   Results from last 7 days   Lab Units 02/24/25  0451 02/23/25  1301   WBC Thousand/uL 9.40 9.98   HEMOGLOBIN g/dL 13.5 13.8   HEMATOCRIT % 40.6 41.9   PLATELETS Thousands/uL 231 253   SEGS PCT %  --  78*   LYMPHO PCT %  --  15   MONO PCT %  --  3*   EOS PCT %  --  2     Results from last 7 days   Lab Units 02/24/25  0451 02/23/25  1301   SODIUM mmol/L 136 137   POTASSIUM mmol/L 3.9 4.5   CHLORIDE mmol/L 104 102   CO2 mmol/L 24 28   BUN mg/dL 10 11   CREATININE mg/dL 0.68 0.81   ANION GAP mmol/L 8 7   CALCIUM mg/dL 8.8 9.1   ALBUMIN g/dL  --  4.1   TOTAL BILIRUBIN mg/dL  --  0.38   ALK PHOS U/L  --  69   ALT U/L  --  11   AST U/L  --  11*   GLUCOSE RANDOM mg/dL 138 93     Results from last 7 days   Lab Units 02/23/25  1301   INR  0.92             Results from last 7 days   Lab Units 02/23/25  1301   LACTIC ACID mmol/L 0.8   PROCALCITONIN ng/ml <0.05       Recent Cultures (last 7 days):   Results from last 7 days   Lab Units 02/23/25  1305 02/23/25  1301   BLOOD CULTURE  Received in Microbiology Lab. Culture in Progress. Received in Microbiology Lab. Culture in Progress.       Imaging Results Review: I reviewed radiology reports from this admission including: ct neck.  Other Study Results Review: EKG was reviewed.     Last 24 Hours Medication List:     Current Facility-Administered Medications:     acetaminophen (TYLENOL) tablet 650 mg, Q6H PRN    albuterol (PROVENTIL HFA,VENTOLIN HFA) inhaler 2 puff, Q6H PRN    ampicillin-sulbactam (UNASYN) 3 g in sodium chloride 0.9 % 100 mL IVPB, Q6H, Last Rate: 3 g (02/24/25 0945)    budesonide-formoterol (SYMBICORT) 80-4.5 MCG/ACT inhaler 2 puff, Daily    calcium carbonate (TUMS) chewable tablet 500 mg, Daily  PRN    citalopram (CeleXA) tablet 40 mg, Daily    clonazePAM (KlonoPIN) tablet 0.5 mg, BID PRN    dexamethasone (PF) (DECADRON) injection 10 mg, Q8H LACEY    levothyroxine tablet 112 mcg, Daily    morphine injection 2 mg, Q4H PRN    morphine injection 4 mg, Q4H PRN    multivitamin-minerals (CENTRUM) tablet 1 tablet, Daily    nicotine (NICODERM CQ) 7 mg/24hr TD 24 hr patch 1 patch, Daily    ondansetron (ZOFRAN) injection 4 mg, Q6H PRN    polyethylene glycol (MIRALAX) packet 17 g, Daily PRN    QUEtiapine (SEROquel) tablet 100 mg, HS    Administrative Statements   Today, Patient Was Seen By: Priscilla Gramajo MD      **Please Note: This note may have been constructed using a voice recognition system.**

## 2025-02-24 NOTE — PLAN OF CARE
Problem: PAIN - ADULT  Goal: Verbalizes/displays adequate comfort level or baseline comfort level  Description: Interventions:  - Encourage patient to monitor pain and request assistance  - Assess pain using appropriate pain scale  - Administer analgesics based on type and severity of pain and evaluate response  - Implement non-pharmacological measures as appropriate and evaluate response  - Consider cultural and social influences on pain and pain management  - Notify physician/advanced practitioner if interventions unsuccessful or patient reports new pain  Outcome: Progressing     Problem: INFECTION - ADULT  Goal: Absence or prevention of progression during hospitalization  Description: INTERVENTIONS:  - Assess and monitor for signs and symptoms of infection  - Monitor lab/diagnostic results  - Monitor all insertion sites, i.e. indwelling lines, tubes, and drains  - Monitor endotracheal if appropriate and nasal secretions for changes in amount and color  - [place] appropriate cooling/warming therapies per order  - Administer medications as ordered  - Instruct and encourage patient and family to use good hand hygiene technique  - Identify and instruct in appropriate isolation precautions for identified infection/condition  Outcome: Progressing     Problem: NEUROSENSORY - ADULT  Goal: Achieves stable or improved neurological status  Description: INTERVENTIONS  - Monitor and report changes in neurological status  - Monitor vital signs such as temperature, blood pressure, glucose, and any other labs ordered   - Initiate measures to prevent increased intracranial pressure  - Monitor for seizure activity and implement precautions if appropriate      Outcome: Progressing     Problem: RESPIRATORY - ADULT  Goal: Achieves optimal ventilation and oxygenation  Description: INTERVENTIONS:  - Assess for changes in respiratory status  - Assess for changes in mentation and behavior  - Position to facilitate oxygenation and  minimize respiratory effort  - Oxygen administered by appropriate delivery if ordered  - Initiate smoking cessation education as indicated  - Encourage broncho-pulmonary hygiene including cough, deep breathe, Incentive Spirometry  - Assess the need for suctioning and aspirate as needed  - Assess and instruct to report SOB or any respiratory difficulty  - Respiratory Therapy support as indicated  Outcome: Progressing

## 2025-02-24 NOTE — ASSESSMENT & PLAN NOTE
Patient has a sore throat for the last week and also took some outpatient steroids as prescribed by urgent care for her throat.  Today she presented with increased redness swelling of the right side of her face and neck and some difficulty with swallowing.  CT soft tissue neck shows Findings of acute right submandibular sialoadenitis. No obstructing calculi. No discrete abscess.Reactive right cervical adenopathy and cellulitic change in the facial and upper neck subcutaneous soft tissues, right worse than left.Edematous appearing right aryepiglottic fold which may reflect a concomitant or reactive right supraglottitis. Recommend correlation with clinical exam.    Consult placed to ENT.  Will place on Unasyn, Decadron, warm soaks and see how she does.  Some swelling is slowly improving however still quite significant swelling on the entire side of the face and neck.  Will switch to oral steroids tomorrow and discharge home tomorrow.  Appreciate ENT input

## 2025-02-25 VITALS
OXYGEN SATURATION: 99 % | RESPIRATION RATE: 18 BRPM | SYSTOLIC BLOOD PRESSURE: 111 MMHG | HEART RATE: 74 BPM | TEMPERATURE: 97 F | DIASTOLIC BLOOD PRESSURE: 65 MMHG

## 2025-02-25 PROCEDURE — 99239 HOSP IP/OBS DSCHRG MGMT >30: CPT | Performed by: STUDENT IN AN ORGANIZED HEALTH CARE EDUCATION/TRAINING PROGRAM

## 2025-02-25 RX ORDER — METHYLPREDNISOLONE 4 MG/1
TABLET ORAL
Qty: 21 EACH | Refills: 0 | Status: SHIPPED | OUTPATIENT
Start: 2025-02-25

## 2025-02-25 RX ADMIN — Medication 1 TABLET: at 08:00

## 2025-02-25 RX ADMIN — AMPICILLIN SODIUM AND SULBACTAM SODIUM 3 G: 10; 5 INJECTION, POWDER, FOR SOLUTION INTRAMUSCULAR; INTRAVENOUS at 08:02

## 2025-02-25 RX ADMIN — CALCIUM CARBONATE (ANTACID) CHEW TAB 500 MG 500 MG: 500 CHEW TAB at 03:40

## 2025-02-25 RX ADMIN — CITALOPRAM HYDROBROMIDE 40 MG: 20 TABLET ORAL at 08:00

## 2025-02-25 RX ADMIN — MORPHINE SULFATE 2 MG: 2 INJECTION, SOLUTION INTRAMUSCULAR; INTRAVENOUS at 05:30

## 2025-02-25 RX ADMIN — LEVOTHYROXINE SODIUM 112 MCG: 112 TABLET ORAL at 08:01

## 2025-02-25 RX ADMIN — AMPICILLIN SODIUM AND SULBACTAM SODIUM 3 G: 10; 5 INJECTION, POWDER, FOR SOLUTION INTRAMUSCULAR; INTRAVENOUS at 03:01

## 2025-02-25 NOTE — PLAN OF CARE
Problem: PAIN - ADULT  Goal: Verbalizes/displays adequate comfort level or baseline comfort level  Description: Interventions:  - Encourage patient to monitor pain and request assistance  - Assess pain using appropriate pain scale  - Administer analgesics based on type and severity of pain and evaluate response  - Implement non-pharmacological measures as appropriate and evaluate response  - Consider cultural and social influences on pain and pain management  - Notify physician/advanced practitioner if interventions unsuccessful or patient reports new pain  Outcome: Progressing     Problem: INFECTION - ADULT  Goal: Absence or prevention of progression during hospitalization  Description: INTERVENTIONS:  - Assess and monitor for signs and symptoms of infection  - Monitor lab/diagnostic results  - Monitor all insertion sites, i.e. indwelling lines, tubes, and drains  - Monitor endotracheal if appropriate and nasal secretions for changes in amount and color  - [place] appropriate cooling/warming therapies per order  - Administer medications as ordered  - Instruct and encourage patient and family to use good hand hygiene technique  - Identify and instruct in appropriate isolation precautions for identified infection/condition  Outcome: Progressing  Goal: Absence of fever/infection during neutropenic period  Description: INTERVENTIONS:  - Monitor WBC    Outcome: Progressing     Problem: NEUROSENSORY - ADULT  Goal: Achieves stable or improved neurological status  Description: INTERVENTIONS  - Monitor and report changes in neurological status  - Monitor vital signs such as temperature, blood pressure, glucose, and any other labs ordered   - Initiate measures to prevent increased intracranial pressure  - Monitor for seizure activity and implement precautions if appropriate      Outcome: Progressing     Problem: RESPIRATORY - ADULT  Goal: Achieves optimal ventilation and oxygenation  Description: INTERVENTIONS:  - Assess  for changes in respiratory status  - Assess for changes in mentation and behavior  - Position to facilitate oxygenation and minimize respiratory effort  - Oxygen administered by appropriate delivery if ordered  - Initiate smoking cessation education as indicated  - Encourage broncho-pulmonary hygiene including cough, deep breathe, Incentive Spirometry  - Assess the need for suctioning and aspirate as needed  - Assess and instruct to report SOB or any respiratory difficulty  - Respiratory Therapy support as indicated  Outcome: Progressing     Problem: GASTROINTESTINAL - ADULT  Goal: Maintains adequate nutritional intake  Description: INTERVENTIONS:  - Monitor percentage of each meal consumed  - Identify factors contributing to decreased intake, treat as appropriate  - Assist with meals as needed  - Monitor I&O, weight, and lab values if indicated  - Obtain nutrition services referral as needed  Outcome: Progressing

## 2025-02-25 NOTE — DISCHARGE SUMMARY
Discharge Summary - Hospitalist   Name: Meka Leonardo 29 y.o. female I MRN: [id number]  Unit/Bed#: -01 I Date of Admission: 2/23/2025   Date of Service: 2/25/2025 I Hospital Day: 2     Assessment & Plan  Facial cellulitis  Patient has a sore throat for the last week and also took some outpatient steroids as prescribed by urgent care for her throat. She presented with increased redness swelling of the right side of her face and neck and some difficulty with swallowing.  CT soft tissue neck shows Findings of acute right submandibular sialoadenitis. No obstructing calculi. No discrete abscess.Reactive right cervical adenopathy and cellulitic change in the facial and upper neck subcutaneous soft tissues, right worse than left.Edematous appearing right aryepiglottic fold which may reflect a concomitant or reactive right supraglottitis. Recommend correlation with clinical exam.    ENT was consulted and patient was followed by ENT while hospitalized.    Patient was treated with Unasyn, Decadron, warm soaks and significantly improved.  She was seen by ENT prior to discharge recommend discharging on Medrol Dosepak and Augmentin 875 twice daily x 10 days.  Patient should follow-up with ENT in the outpatient setting -ENT office contact information provided on discharge.  Sialoadenitis  Please see plan under facial cellulitis  Bipolar 1 disorder (HCC)  Currently stable.  Resume home meds     Medical Problems       Resolved Problems  Date Reviewed: 1/15/2025   None         MESSAGE TO PCP (Ruiz Wolf MD) FOR FOLLOW UP:   Thank you for allowing us to participate in the care of your patient, Meka Leonardo, who was hospitalized from 2/23/2025 through 02/25/25 with the admitting diagnosis of sialoadenitis treated with IV Unasyn and Decadron.  Patient was seen by ENT, noted to have improvement in symptoms, discharged on Augmentin twice daily x 10 days and Medrol Dosepak.  Needs to follow-up with the ENT in the  outpatient setting.    Medication Changes:  Start taking Augmentin twice daily x 10 days  Start taking Medrol Dosepak  Outpatient testing recommended:  Follow-up with ENT this week -contact information provided on discharge  If you have any additional questions or would like to discuss further, please feel free to contact me.  Noemy Abbott MD  [facility] Internal Medicine, Hospitalist, [phone]     Admission Date:   Admission Orders (From admission, onward)       Ordered        02/23/25 1507  INPATIENT ADMISSION  Once                          Discharge Date: 02/25/25    Consultations During Hospital Stay:  ENT    Procedures Performed:   None    Significant Findings / Test Results:   CT soft tissue neck   Final Result by Andrae Grimm MD (02/23 1411)      Findings of acute right submandibular sialoadenitis. No obstructing calculi. No discrete abscess.      Reactive right cervical adenopathy and cellulitic change in the facial and upper neck subcutaneous soft tissues, right worse than left.      Edematous appearing right aryepiglottic fold which may reflect a concomitant or reactive right supraglottitis. Recommend correlation with clinical exam.      Workstation performed: [id number]               Incidental Findings:   As above      Test Results Pending at Discharge (will require follow up):   Blood cultures x 2    Complications: None    Reason for Admission: Facial swelling    Hospital Course:   Meka Leonardo is a 29 y.o. female patient who originally presented to the hospital on 2/23/2025 due to increased swelling of the right side of the neck that started day before admission and progressively got worse, with increased redness and associated difficulty swallowing.  Patient of note reports that she was seen by her family doctor due to 1 to 2-week history of sore throat, and was treated with some steroids, however noted to have worsening symptoms therefore presented to the ED.  On admission patient's  vital signs were stable, however CT soft tissue neck showed acute right submandibular sialoadenitis.  Patient was started on IV Unasyn, and IV Decadron and ENT was consulted.  Patient was seen and followed by ENT, noted to have improvement in symptoms, therefore being discharged on oral Augmentin twice daily x 10 days and Medrol Dosepak.  Patient needs to follow-up with ENT this week, contact information provided on discharge.  Patient was medically stable for discharge home.  She was able to tolerate a diet without any difficulty swallowing, vital signs stable, no respiratory distress or difficulty breathing noted.  Patient notes symptoms improved compared to admission.      Please see above list of diagnoses and related plan for additional information.     Condition at Discharge: stable    Discharge Day Visit / Exam:   Subjective: Patient seen and examined at bedside.  No acute events overnight.  Patient reports improvement in symptoms, she is eager to be discharged home.  Vitals: Blood Pressure: 111/65 (02/25/25 0637)  Pulse: 74 (02/25/25 0637)  Temperature: (!) 97 °F (36.1 °C) (02/25/25 0637)  Temp Source: Temporal (02/24/25 0738)  Respirations: 18 (02/25/25 0637)  SpO2: 99 % (02/25/25 0845)  Physical Exam   Vitals and nursing note reviewed.   Constitutional:       Appearance: Normal appearance.   HENT:      Head: Normocephalic and atraumatic.      Right Ear: External ear normal.      Left Ear: External ear normal.      Nose: Nose normal.      Mouth/Throat:      Pharynx: Oropharynx is clear.   Eyes:      Pupils: Pupils are equal, round, and reactive to light.   Neck:      Comments:  Mild swelling of the right side of the face and neck  Cardiovascular:      Rate and Rhythm: Normal rate and regular rhythm.      Heart sounds: Normal heart sounds.   Pulmonary:      Effort: Pulmonary effort is normal.      Breath sounds: Normal breath sounds.   Abdominal:      General: Bowel sounds are normal.      Palpations:  Abdomen is soft.      Tenderness: There is no abdominal tenderness.   Musculoskeletal:         General: Normal range of motion.      Cervical back: Normal range of motion and neck supple.   Skin:     General: Skin is warm and dry.   Neurological:      General: No focal deficit present.      Mental Status: She is alert and oriented to person, place, and time.   Psychiatric:         Mood and Affect: Mood normal.     Discussion with Family: Patient declined call to .     Discharge instructions/Information to patient and family:   See after visit summary for information provided to patient and family.      Provisions for Follow-Up Care:  See after visit summary for information related to follow-up care and any pertinent home health orders.      Mobility at time of Discharge:   Basic Mobility Inpatient Raw Score: 24  JH-HLM Goal: 8: Walk 250 feet or more  JH-HLM Achieved: 7: Walk 25 feet or more  HLM Goal NOT achieved. Continue to encourage mobility in post discharge setting.     Disposition:   Home    Planned Readmission: No    Discharge Medications:  See after visit summary for reconciled discharge medications provided to patient and/or family.      Administrative Statements       **Please Note: This note may have been constructed using a voice recognition system**

## 2025-02-25 NOTE — ASSESSMENT & PLAN NOTE
Patient has a sore throat for the last week and also took some outpatient steroids as prescribed by urgent care for her throat. She presented with increased redness swelling of the right side of her face and neck and some difficulty with swallowing.  CT soft tissue neck shows Findings of acute right submandibular sialoadenitis. No obstructing calculi. No discrete abscess.Reactive right cervical adenopathy and cellulitic change in the facial and upper neck subcutaneous soft tissues, right worse than left.Edematous appearing right aryepiglottic fold which may reflect a concomitant or reactive right supraglottitis. Recommend correlation with clinical exam.    ENT was consulted and patient was followed by ENT while hospitalized.    Patient was treated with Unasyn, Decadron, warm soaks and significantly improved.  She was seen by ENT prior to discharge recommend discharging on Medrol Dosepak and Augmentin 875 twice daily x 10 days.  Patient should follow-up with ENT in the outpatient setting -ENT office contact information provided on discharge.

## 2025-02-25 NOTE — PROGRESS NOTES
Progress Note - ENT   Name: Meka Leonardo 29 y.o. female I MRN: [id number]  Unit/Bed#: -01 I Date of Admission: 2/23/2025   Date of Service: 2/25/2025 I Hospital Day: 2     Assessment & Plan  Facial cellulitis    Sialoadenitis    Bipolar 1 disorder (HCC)    Status post submandibular sialadenitis and cervical adenitis with near complete resolution on IV antibiotics and steroids.  Recommend Augmentin 875 twice daily for 10 days with a Medrol Dosepak and follow-up on Friday in the office for examination.  24 Hour Events : None  Subjective : Patient able to eat and swallow although it is still a bit painful, but definitely improved.    Objective :  Temp:  [97 °F (36.1 °C)-97.6 °F (36.4 °C)] 97 °F (36.1 °C)  HR:  [74-89] 74  BP: (111-135)/(65-79) 111/65  Resp:  [16-18] 18  SpO2:  [93 %-98 %] 98 %  O2 Device: None (Room air)    Physical Exam  Submandibular gland on the right is supple and floor of mouth shows no evidence of infection.  Adenitis is greater than 80% improved.

## 2025-02-25 NOTE — PLAN OF CARE
Problem: PAIN - ADULT  Goal: Verbalizes/displays adequate comfort level or baseline comfort level  Description: Interventions:  - Encourage patient to monitor pain and request assistance  - Assess pain using appropriate pain scale  - Administer analgesics based on type and severity of pain and evaluate response  - Implement non-pharmacological measures as appropriate and evaluate response  - Consider cultural and social influences on pain and pain management  - Notify physician/advanced practitioner if interventions unsuccessful or patient reports new pain  Outcome: Progressing     Problem: INFECTION - ADULT  Goal: Absence or prevention of progression during hospitalization  Description: INTERVENTIONS:  - Assess and monitor for signs and symptoms of infection  - Monitor lab/diagnostic results  - Monitor all insertion sites, i.e. indwelling lines, tubes, and drains  - Monitor endotracheal if appropriate and nasal secretions for changes in amount and color  - [place] appropriate cooling/warming therapies per order  - Administer medications as ordered  - Instruct and encourage patient and family to use good hand hygiene technique  - Identify and instruct in appropriate isolation precautions for identified infection/condition  Outcome: Progressing  Goal: Absence of fever/infection during neutropenic period  Description: INTERVENTIONS:  - Monitor WBC    Outcome: Progressing     Problem: NEUROSENSORY - ADULT  Goal: Achieves stable or improved neurological status  Description: INTERVENTIONS  - Monitor and report changes in neurological status  - Monitor vital signs such as temperature, blood pressure, glucose, and any other labs ordered   - Initiate measures to prevent increased intracranial pressure  - Monitor for seizure activity and implement precautions if appropriate      Outcome: Progressing     Problem: RESPIRATORY - ADULT  Goal: Achieves optimal ventilation and oxygenation  Description: INTERVENTIONS:  - Assess  for changes in respiratory status  - Assess for changes in mentation and behavior  - Position to facilitate oxygenation and minimize respiratory effort  - Oxygen administered by appropriate delivery if ordered  - Initiate smoking cessation education as indicated  - Encourage broncho-pulmonary hygiene including cough, deep breathe, Incentive Spirometry  - Assess the need for suctioning and aspirate as needed  - Assess and instruct to report SOB or any respiratory difficulty  - Respiratory Therapy support as indicated  Outcome: Progressing     Problem: GASTROINTESTINAL - ADULT  Goal: Maintains adequate nutritional intake  Description: INTERVENTIONS:  - Monitor percentage of each meal consumed  - Identify factors contributing to decreased intake, treat as appropriate  - Assist with meals as needed  - Monitor I&O, weight, and lab values if indicated  - Obtain nutrition services referral as needed  Outcome: Progressing

## 2025-02-26 NOTE — UTILIZATION REVIEW
NOTIFICATION OF ADMISSION DISCHARGE   This is a Notification of Discharge from [facility]. Please be advised that this patient has been discharge from our facility. Below you will find the admission and discharge date and time including the patient’s disposition.   UTILIZATION REVIEW CONTACT:  Dana Carver  Utilization   Network Utilization Review Department  Phone: [phone] x carefully listen to the prompts. All voicemails are confidential.  Email: [email or web address]     ADMISSION INFORMATION  PRESENTATION DATE: 2/23/2025 12:49 PM  OBERVATION ADMISSION DATE: N/A  INPATIENT ADMISSION DATE: 2/23/25  3:08 PM   DISCHARGE DATE: 2/25/2025 12:52 PM   DISPOSITION:Home/Self Care    Network Utilization Review Department  ATTENTION: Please call with any questions or concerns to [phone] and carefully listen to the prompts so that you are directed to the right person. All voicemails are confidential.   For Discharge needs, contact Care Management DC Support Team at [phone] opt. 2  Send all requests for admission clinical reviews, approved or denied determinations and any other requests to dedicated fax number below belonging to the campus where the patient is receiving treatment. List of dedicated fax numbers for the Facilities:  FACILITY NAME UR FAX NUMBER   ADMISSION DENIALS (Administrative/Medical Necessity) [phone]   DISCHARGE SUPPORT TEAM ([facility]) [phone]   PARENT CHILD HEALTH (Maternity/NICU/Pediatrics) [phone]   [facility] [phone]   [facility] [phone]   [facility] [phone]   [facility] [phone]   [facility] [phone]   [facility] [phone]   [facility] [phone]   [facility]  [phone]   [facility] [phone]   [facility] [phone]   [facility] [phone]   [facility] [phone]

## 2025-02-28 LAB
BACTERIA BLD CULT: NORMAL
BACTERIA BLD CULT: NORMAL

## 2025-03-13 ENCOUNTER — APPOINTMENT (OUTPATIENT)
Dept: RADIOLOGY | Facility: CLINIC | Age: 30
End: 2025-03-13
Payer: OTHER MISCELLANEOUS

## 2025-03-13 ENCOUNTER — OCCMED (OUTPATIENT)
Dept: URGENT CARE | Facility: CLINIC | Age: 30
End: 2025-03-13
Payer: OTHER MISCELLANEOUS

## 2025-03-13 DIAGNOSIS — M25.532 LEFT WRIST PAIN: ICD-10-CM

## 2025-03-13 DIAGNOSIS — M25.532 LEFT WRIST PAIN: Primary | ICD-10-CM

## 2025-03-13 PROCEDURE — 73130 X-RAY EXAM OF HAND: CPT

## 2025-03-13 PROCEDURE — 99213 OFFICE O/P EST LOW 20 MIN: CPT

## 2025-03-13 PROCEDURE — 73110 X-RAY EXAM OF WRIST: CPT

## 2025-03-14 ENCOUNTER — RESULTS FOLLOW-UP (OUTPATIENT)
Dept: URGENT CARE | Facility: CLINIC | Age: 30
End: 2025-03-14

## 2025-03-19 ENCOUNTER — APPOINTMENT (OUTPATIENT)
Dept: URGENT CARE | Facility: CLINIC | Age: 30
End: 2025-03-19
Payer: OTHER MISCELLANEOUS

## 2025-03-19 PROCEDURE — 99213 OFFICE O/P EST LOW 20 MIN: CPT | Performed by: PHYSICIAN ASSISTANT

## 2025-03-26 ENCOUNTER — APPOINTMENT (OUTPATIENT)
Dept: URGENT CARE | Facility: CLINIC | Age: 30
End: 2025-03-26
Payer: OTHER MISCELLANEOUS

## 2025-03-26 PROCEDURE — 99213 OFFICE O/P EST LOW 20 MIN: CPT | Performed by: PHYSICIAN ASSISTANT

## 2025-06-25 ENCOUNTER — HOSPITAL ENCOUNTER (EMERGENCY)
Facility: HOSPITAL | Age: 30
Discharge: HOME | End: 2025-06-25
Payer: COMMERCIAL

## 2025-06-25 VITALS
WEIGHT: 214.29 LBS | HEIGHT: 64 IN | DIASTOLIC BLOOD PRESSURE: 81 MMHG | TEMPERATURE: 97.8 F | RESPIRATION RATE: 18 BRPM | HEART RATE: 89 BPM | OXYGEN SATURATION: 98 % | BODY MASS INDEX: 36.58 KG/M2 | SYSTOLIC BLOOD PRESSURE: 133 MMHG
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
## 2025-06-25 PROCEDURE — 96372 THER/PROPH/DIAG INJ SC/IM: CPT

## 2025-06-25 PROCEDURE — 99283 EMERGENCY DEPT VISIT LOW MDM: CPT

## 2025-06-25 PROCEDURE — 99284 EMERGENCY DEPT VISIT MOD MDM: CPT | Performed by: PHYSICIAN ASSISTANT

## 2025-06-25 PROCEDURE — 90675 RABIES VACCINE IM: CPT | Performed by: PHYSICIAN ASSISTANT

## 2025-06-25 PROCEDURE — 90375 RABIES IG IM/SC: CPT | Performed by: PHYSICIAN ASSISTANT

## 2025-06-25 PROCEDURE — 90472 IMMUNIZATION ADMIN EACH ADD: CPT

## 2025-06-25 PROCEDURE — 90471 IMMUNIZATION ADMIN: CPT

## 2025-06-25 PROCEDURE — 90715 TDAP VACCINE 7 YRS/> IM: CPT | Performed by: PHYSICIAN ASSISTANT

## 2025-06-25 RX ADMIN — TETANUS TOXOID, REDUCED DIPHTHERIA TOXOID AND ACELLULAR PERTUSSIS VACCINE, ADSORBED 0.5 ML: 5; 2.5; 8; 8; 2.5 SUSPENSION INTRAMUSCULAR at 20:50

## 2025-06-25 RX ADMIN — RABIES VIRUS STRAIN PM-1503-3M ANTIGEN (PROPIOLACTONE INACTIVATED) AND WATER 1 ML: KIT at 20:51

## 2025-06-25 RX ADMIN — RABIES IMMUNE GLOBULIN (HUMAN) 2100 UNITS: 300 INJECTION, SOLUTION INFILTRATION; INTRAMUSCULAR at 20:53

## 2025-06-25 RX ADMIN — AMOXICILLIN AND CLAVULANATE POTASSIUM 1 TABLET: 875; 125 TABLET, FILM COATED ORAL at 20:48

## 2025-06-26 NOTE — DISCHARGE INSTRUCTIONS
Fax from [facility] requesting refills for patient:  Escitalopram 5mg #90 w/3 refills  Bupropion HCL XL tabs 150mg #90 w/3 refill  Pravastatin 20mg #90 w/3 refills  Ezetimibe Tabs 10mg #90 w/3 refills  [phone]   Keep wounds clean and dry.  Please take antibiotics as prescribed.  You received the first dose of your rabies vaccine tonight.  This would be day 0.  You additionally need a rabies vaccine on day 3 7 and 14.  Therefore you should be seen in our emergency department or possible urgent care for your rabies vaccine on June 28, July 2, July 9  Please return with any new or worsening symptoms.

## 2025-06-26 NOTE — ED PROVIDER NOTES
Time reflects when diagnosis was documented in both MDM as applicable and the Disposition within this note       Time User Action Codes Description Comment    6/25/2025  8:54 PM Anne Lakhani Add [W54.0XXA] Dog bite, initial encounter           ED Disposition       ED Disposition   Discharge    Condition   Stable    Date/Time   Wed Jun 25, 2025  8:54 PM    Comment   Meka Leonardo discharge to home/self care.                   Assessment & Plan       Medical Decision Making  29-year-old female presented to the emergency department for evaluation of dog bite.  Vitals and medical records reviewed.  Patient was physical exam limited to dog bite, dog scratch, contusion.  Wounds were cleaned.  Patient was started on oral antibiotics to prevent infection.  Tetanus updated.  Patient started on rabies vaccine series.  We discussed symptomatic treatment strict return precautions and follow-up and she verbalized understanding.  She is clinically hemodynamically stable for discharge    Problems Addressed:  Dog bite, initial encounter: acute illness or injury    Risk  Prescription drug management.             Medications   amoxicillin-clavulanate (AUGMENTIN) 875-125 mg per tablet 1 tablet (1 tablet Oral Given 6/25/25 2048)   tetanus-diphtheria-acellular pertussis (BOOSTRIX) IM injection 0.5 mL (0.5 mL Intramuscular Given 6/25/25 2050)   rabies immune globulin, human (HyperRAB) injection 2,100 Units (2,100 Units Infiltration Given 6/25/25 2053)   rabies vaccine, human diploid IM injection 1 mL (1 mL Intramuscular Given 6/25/25 2051)       ED Risk Strat Scores                    No data recorded        SBIRT 20yo+      Flowsheet Row Most Recent Value   Initial Alcohol Screen: US AUDIT-C     1. How often do you have a drink containing alcohol? 0 Filed at: 06/25/2025 2015   2. How many drinks containing alcohol do you have on a typical day you are drinking?  0 Filed at: 06/25/2025 2015   3b. FEMALE Any Age, or MALE 65+: How  "often do you have 4 or more drinks on one occassion? 0 Filed at: 06/25/2025 2015   Audit-C Score 0 Filed at: 06/25/2025 2015   MARLENE: How many times in the past year have you...    Used an illegal drug or used a prescription medication for non-medical reasons? Never Filed at: 06/25/2025 2015                            History of Present Illness       Chief Complaint   Patient presents with    Dog Bite     BIBA from home, pt reports neighbor's dogs got out and bit her left knee and left hand. Unsure if dogs are vaccinated. Up to date on tetanus. Bleeding controlled in triage.       Past Medical History[1]   Past Surgical History[2]   Family History[3]   Social History[4]   E-Cigarette/Vaping    E-Cigarette Use Current Every Day User     Comments Pt \"vapes\" daily       E-Cigarette/Vaping Substances    Nicotine Yes     THC Yes     CBD No     Flavoring Yes     Other No     Unknown No       I have reviewed and agree with the history as documented.     29-year-old female presents to the emergency department via ambulance for evaluation of dog bite.  Patient states she was outside and her neighbors dog bit her in the left hand and behind the left knee.  States she is not sure if the dogs are vaccinated.  Patient states she does not trust the neighbor to observe the dog for the next 10 days.  States she is unsure of her last tetanus.        Review of Systems   Constitutional: Negative.    Respiratory: Negative.     Cardiovascular: Negative.    Musculoskeletal: Negative.    Skin:  Positive for wound.   Neurological: Negative.    All other systems reviewed and are negative.          Objective       ED Triage Vitals [06/25/25 2014]   Temperature Pulse Blood Pressure Respirations SpO2 Patient Position - Orthostatic VS   97.8 °F (36.6 °C) 89 133/81 18 98 % Lying      Temp Source Heart Rate Source BP Location FiO2 (%) Pain Score    Temporal Monitor Right arm -- 6      Vitals      Date and Time Temp Pulse SpO2 Resp BP Pain Score " FACES Pain Rating User   06/25/25 2014 97.8 °F (36.6 °C) 89 98 % 18 133/81 6 -- AR            Physical Exam  Vitals and nursing note reviewed.   Constitutional:       General: She is not in acute distress.     Appearance: Normal appearance. She is not ill-appearing, toxic-appearing or diaphoretic.   HENT:      Head: Normocephalic.      Nose: Nose normal.     Eyes:      Conjunctiva/sclera: Conjunctivae normal.     Pulmonary:      Effort: Pulmonary effort is normal.     Musculoskeletal:         General: Normal range of motion.     Skin:     General: Skin is warm and dry.      Comments: Small superficial wound left hand, small superficial wound behind the left knee.  No active bleeding.  See photos below     Neurological:      General: No focal deficit present.      Mental Status: She is alert.               Results Reviewed       None            No orders to display       Procedures    ED Medication and Procedure Management   Prior to Admission Medications   Prescriptions Last Dose Informant Patient Reported? Taking?   Multiple Vitamin (multivitamin) capsule   Yes No   Sig: Take 1 capsule by mouth daily   QUEtiapine (SEROquel) 200 mg tablet  Self Yes No   Sig: Take 100 mg by mouth daily at bedtime   acetaminophen (TYLENOL) 500 mg tablet   Yes No   Sig: Take 1,000 mg by mouth every 6 (six) hours as needed for mild pain   albuterol (2.5 mg/3 mL) 0.083 % nebulizer solution   No No   Sig: Take 3 mL (2.5 mg total) by nebulization every 4 (four) hours as needed for wheezing or shortness of breath   albuterol (PROVENTIL HFA,VENTOLIN HFA) 90 mcg/act inhaler   Yes No   Sig: Inhale 2 puffs every 6 (six) hours as needed for wheezing   budesonide-formoterol (SYMBICORT) 80-4.5 MCG/ACT inhaler   Yes No   Sig: Inhale 2 puffs daily    citalopram (CeleXA) 20 mg tablet  Self Yes No   Sig: Take 40 mg by mouth daily    clonazePAM (KlonoPIN) 0.5 mg tablet   Yes No   Sig: Take 1 tablet by mouth 2 (two) times a day as needed   levothyroxine  112 mcg tablet   Yes No   Sig: Take 112 mcg by mouth daily   methylPREDNISolone 4 MG tablet therapy pack   No No   Sig: Use as directed on package   norgestimate-ethinyl estradiol (ORTHO-CYCLEN) 0.25-35 MG-MCG per tablet   Yes No   Sig: Take 1 tablet by mouth daily      Facility-Administered Medications: None     Discharge Medication List as of 6/25/2025  8:55 PM        START taking these medications    Details   amoxicillin-clavulanate (AUGMENTIN) 875-125 mg per tablet Take 1 tablet by mouth every 12 (twelve) hours for 7 days, Starting Wed 6/25/2025, Until Wed 7/2/2025, Normal           CONTINUE these medications which have NOT CHANGED    Details   acetaminophen (TYLENOL) 500 mg tablet Take 1,000 mg by mouth every 6 (six) hours as needed for mild pain, Historical Med      albuterol (2.5 mg/3 mL) 0.083 % nebulizer solution Take 3 mL (2.5 mg total) by nebulization every 4 (four) hours as needed for wheezing or shortness of breath, Starting Wed 1/15/2025, Normal      albuterol (PROVENTIL HFA,VENTOLIN HFA) 90 mcg/act inhaler Inhale 2 puffs every 6 (six) hours as needed for wheezing, Historical Med      budesonide-formoterol (SYMBICORT) 80-4.5 MCG/ACT inhaler Inhale 2 puffs daily , Starting Tue 2/16/2021, Historical Med      citalopram (CeleXA) 20 mg tablet Take 40 mg by mouth daily , Historical Med      clonazePAM (KlonoPIN) 0.5 mg tablet Take 1 tablet by mouth 2 (two) times a day as needed, Historical Med      levothyroxine 112 mcg tablet Take 112 mcg by mouth daily, Historical Med      methylPREDNISolone 4 MG tablet therapy pack Use as directed on package, Normal      Multiple Vitamin (multivitamin) capsule Take 1 capsule by mouth daily, Historical Med      norgestimate-ethinyl estradiol (ORTHO-CYCLEN) 0.25-35 MG-MCG per tablet Take 1 tablet by mouth daily, Historical Med      QUEtiapine (SEROquel) 200 mg tablet Take 100 mg by mouth daily at bedtime, Historical Med           No discharge procedures on file.  ED  SEPSIS DOCUMENTATION   Time reflects when diagnosis was documented in both MDM as applicable and the Disposition within this note       Time User Action Codes Description Comment    6/25/2025  8:54 PM Anne Lakhani Add [W54.0XXA] Dog bite, initial encounter                      [1]   Past Medical History:  Diagnosis Date    Arthritis     Pt reports in right knee    Asthma     Bipolar 1 disorder (HCC)     Chronic pain disorder     Chronic pain in lower back.    COVID-19 03/2021    Pt reports having flu-like symptoms.    Depression     Disease of thyroid gland     Lipoma     Pt reports lump behind right ear    Nerve disorder     Pet allergy     Seasonal allergies    [2]   Past Surgical History:  Procedure Laterality Date    BACK SURGERY Left 2019    laminectomy    KNEE ARTHROSCOPY Right     KNEE ARTHROSCOPY W/ ACL RECONSTRUCTION      ORTHOPEDIC SURGERY      DE ARTHROSCOPY KNEE SYNOVECTOMY 2/>COMPARTMENTS Right 09/16/2019    Procedure: ARTHROSCOPY KNEE  arthroscopy of right knee 1st, mini open right MPFL reconstruction with allograft;  Surgeon: Ced Gan MD;  Location: [facility];  Service: Orthopedics    DE EXCISION TUMOR SOFT TISS FACE/SCALP SUBQ 2 CM/> Right 09/27/2021    Procedure: BEHIND EAR MASS EXCISION;  Surgeon: Princess Leal DO;  Location:  MAIN OR;  Service: General    REVISION OF SCAR Right 11/18/2019    Procedure: REVISION SCAR EXTREMITY;  Surgeon: Ced Gan MD;  Location: [facility];  Service: Orthopedics    WISDOM TOOTH EXTRACTION     [3]   Family History  Problem Relation Name Age of Onset    Pneumonia Mother      Multiple sclerosis Father     [4]   Social History  Tobacco Use    Smoking status: Former     Types: E-Cigarettes     Passive exposure: Past    Smokeless tobacco: Current    Tobacco comments:     vapes   Vaping Use    Vaping status: Every Day    Substances: Nicotine, THC, Flavoring   Substance Use Topics    Alcohol use: Not Currently    Drug use: Yes     Types: Marijuana      Comment: Pt uses daily for pain management        Anne Lakhani PA-C  06/25/25 [id number]

## 2025-06-28 ENCOUNTER — HOSPITAL ENCOUNTER (EMERGENCY)
Facility: HOSPITAL | Age: 30
Discharge: HOME | End: 2025-06-28
Attending: EMERGENCY MEDICINE | Admitting: EMERGENCY MEDICINE
Payer: COMMERCIAL

## 2025-06-28 VITALS
DIASTOLIC BLOOD PRESSURE: 87 MMHG | TEMPERATURE: 99.4 F | RESPIRATION RATE: 20 BRPM | HEART RATE: 102 BPM | SYSTOLIC BLOOD PRESSURE: 129 MMHG | OXYGEN SATURATION: 98 %

## 2025-06-28 DIAGNOSIS — Z23 ENCOUNTER FOR REPEAT ADMINISTRATION OF RABIES VACCINATION: Primary | ICD-10-CM

## 2025-06-28 PROCEDURE — 90675 RABIES VACCINE IM: CPT | Performed by: EMERGENCY MEDICINE

## 2025-06-28 PROCEDURE — 99283 EMERGENCY DEPT VISIT LOW MDM: CPT | Performed by: EMERGENCY MEDICINE

## 2025-06-28 PROCEDURE — 90471 IMMUNIZATION ADMIN: CPT

## 2025-06-28 RX ADMIN — RABIES VIRUS STRAIN PM-1503-3M ANTIGEN (PROPIOLACTONE INACTIVATED) AND WATER 1 ML: KIT at 16:33

## 2025-06-28 NOTE — DISCHARGE INSTRUCTIONS
Patient Education     Rabies Vaccine (RAY beez vak SEEN)   Brand Names: [place] Imovax Rabies; RabAvert   Brand Names: Pearl Imovax Rabies   What is this drug used for?   It is used to prevent rabies.  What do I need to tell my doctor BEFORE I take this drug?   If you are allergic to this drug; any part of this drug; or any other drugs, foods, or substances. Tell your doctor about the allergy and what signs you had.  This drug may interact with other drugs or health problems.  Tell your doctor and pharmacist about all of your drugs (prescription or OTC, natural products, vitamins) and health problems. You must check to make sure that it is safe for you to take this drug with all of your drugs and health problems. Do not start, stop, or change the dose of any drug without checking with your doctor.  What are some things I need to know or do while I take this drug?   Tell all of your health care providers that you take this drug. This includes your doctors, nurses, pharmacists, and dentists.  Like all vaccines, this vaccine may not fully protect all people who get it. If you have questions, talk with the doctor.  If you have a weak immune system or take drugs that weaken the immune system, talk with your doctor. This vaccine may not work as well.  This drug is made from human plasma (part of the blood) and may have viruses that may cause disease. This drug is screened, tested, and treated to lower the chance that it carries an infection. Talk with the doctor.  Very bad side effects have rarely happened with this drug. These include allergic reactions and brain or nervous system problems. Sometimes, brain problems have been deadly. Talk with the doctor.  Tell your doctor if you are pregnant, plan on getting pregnant, or are breast-feeding. You will need to talk about the benefits and risks to you and the baby.  What are some side effects that I need to call my doctor about right away?   WARNING/CAUTION: Even though it may  be rare, some people may have very bad and sometimes deadly side effects when taking a drug. Tell your doctor or get medical help right away if you have any of the following signs or symptoms that may be related to a very bad side effect:  Signs of an allergic reaction, like rash; hives; itching; red, swollen, blistered, or peeling skin with or without fever; wheezing; tightness in the chest or throat; trouble breathing, swallowing, or talking; unusual hoarseness; or swelling of the mouth, face, lips, tongue, or throat.  Signs of meningitis like headache with fever, stiff neck, upset stomach, confusion, or if lights bother the eyes.  Feeling very tired or weak.  Not able to move face muscles as much.  Muscle weakness.  Not able to move.  Change in eyesight.  Swollen gland.  Severe dizziness or passing out.  A burning, numbness, or tingling feeling that is not normal.  Trouble controlling body movements.  Seizures.  What are some other side effects of this drug?   All drugs may cause side effects. However, many people have no side effects or only have minor side effects. Call your doctor or get medical help if any of these side effects or any other side effects bother you or do not go away:  Pain, redness, or swelling where the shot was given.  Flu-like signs. These include headache, weakness, fever, shakes, aches, pains, and sweating.  Upset stomach.  Stomach pain.  Muscle or joint pain.  Dizziness.  These are not all of the side effects that may occur. If you have questions about side effects, call your doctor. Call your doctor for medical advice about side effects.  You may report side effects to your national health agency.  Report side effects to the FDA/CDC Vaccine Adverse Event Reporting System (VAERS) at https://vaers.hhs.gov/reportevent.html or by calling 1-779.935.7747.  How is this drug best taken?   Use this drug as ordered by your doctor. Read all information given to you. Follow all instructions  closely.  It is given as a shot into a muscle.  What do I do if I miss a dose?   Call your doctor to find out what to do.  How do I store and/or throw out this drug?   If you need to store this drug at home, talk with your doctor, nurse, or pharmacist about how to store it.  General drug facts   If your symptoms or health problems do not get better or if they become worse, call your doctor.  Do not share your drugs with others and do not take anyone else's drugs.  Keep all drugs in a safe place. Keep all drugs out of the reach of children and pets.  Throw away unused or  drugs. Do not flush down a toilet or pour down a drain unless you are told to do so. Check with your pharmacist if you have questions about the best way to throw out drugs. There may be drug take-back programs in your area.  Some drugs may have another patient information leaflet. If you have any questions about this drug, please talk with your doctor, nurse, pharmacist, or other health care provider.  Some drugs may have another patient information leaflet. Check with your pharmacist. If you have any questions about this drug, please talk with your doctor, nurse, pharmacist, or other health care provider.  If you think there has been an overdose, call your poison control center or get medical care right away. Be ready to tell or show what was taken, how much, and when it happened.  Additional Information   Vaccine Information Statements (VIS) are made by the staff of the Centers for Disease Control and Prevention (CDC). Each VIS gives information to properly inform the adult receiving the vaccine or, in the case of a minor, the child's parent or legal representative about the risks and benefits of each vaccine. Before a doctor vaccinates a child or an adult, the provider is required by the National Childhood Vaccine Injury Act to give a copy of the VIS. You can also get foreign language  versions.  https://www.cdc.gov/vaccines/hcp/vis/vis-statements/rabies.html   Consumer Information Use and Disclaimer   This generalized information is a limited summary of diagnosis, treatment, and/or medication information. It is not meant to be comprehensive and should be used as a tool to help the user understand and/or assess potential diagnostic and treatment options. It does NOT include all information about conditions, treatments, medications, side effects, or risks that may apply to a specific patient. It is not intended to be medical advice or a substitute for the medical advice, diagnosis, or treatment of a health care provider based on the health care provider's examination and assessment of a patient's specific and unique circumstances. Patients must speak with a health care provider for complete information about their health, medical questions, and treatment options, including any risks or benefits regarding use of medications. This information does not endorse any treatments or medications as safe, effective, or approved for treating a specific patient. [employer] and its affiliates disclaim any warranty or liability relating to this information or the use thereof. The use of this information is governed by the Terms of Use, available at https://www.woltersEverypointuwer.com/en/know/clinical-effectiveness-terms.  Last Reviewed Date   2023-11-15  Copyright   © 2024 [employer] and its affiliates and/or licensors. All rights reserved.

## 2025-07-02 ENCOUNTER — HOSPITAL ENCOUNTER (EMERGENCY)
Facility: HOSPITAL | Age: 30
Discharge: HOME | End: 2025-07-02
Attending: EMERGENCY MEDICINE
Payer: COMMERCIAL

## 2025-07-02 VITALS
WEIGHT: 208.4 LBS | HEART RATE: 98 BPM | DIASTOLIC BLOOD PRESSURE: 88 MMHG | TEMPERATURE: 97.7 F | OXYGEN SATURATION: 98 % | BODY MASS INDEX: 35.77 KG/M2 | SYSTOLIC BLOOD PRESSURE: 128 MMHG | RESPIRATION RATE: 18 BRPM

## 2025-07-02 DIAGNOSIS — Z23 ENCOUNTER FOR REPEAT ADMINISTRATION OF RABIES VACCINATION: Primary | ICD-10-CM

## 2025-07-02 PROCEDURE — 99283 EMERGENCY DEPT VISIT LOW MDM: CPT | Performed by: EMERGENCY MEDICINE

## 2025-07-02 PROCEDURE — 90471 IMMUNIZATION ADMIN: CPT

## 2025-07-02 PROCEDURE — 90675 RABIES VACCINE IM: CPT | Performed by: EMERGENCY MEDICINE

## 2025-07-02 RX ADMIN — Medication 1 ML: at 15:49

## 2025-07-02 NOTE — ED PROVIDER NOTES
"Time reflects when diagnosis was documented in both MDM as applicable and the Disposition within this note       Time User Action Codes Description Comment    7/2/2025  3:41 PM Wesley Linda Add [Z23] Encounter for repeat administration of rabies vaccination           ED Disposition       ED Disposition   Discharge    Condition   Stable    Date/Time   Wed Jul 2, 2025  3:42 PM    Comment   Meka MAE Myrnalorena discharge to home/self care.                   Assessment & Plan       Medical Decision Making  Risk  Prescription drug management.             Medications   rabies vaccine, human diploid IM injection 1 mL (has no administration in time range)       ED Risk Strat Scores                    No data recorded        SBIRT 20yo+      Flowsheet Row Most Recent Value   Initial Alcohol Screen: US AUDIT-C     1. How often do you have a drink containing alcohol? 0 Filed at: 07/02/2025 1543   2. How many drinks containing alcohol do you have on a typical day you are drinking?  0 Filed at: 07/02/2025 1543   3b. FEMALE Any Age, or MALE 65+: How often do you have 4 or more drinks on one occassion? 0 Filed at: 07/02/2025 1543   Audit-C Score 0 Filed at: 07/02/2025 1547   MARLENE: How many times in the past year have you...    Used an illegal drug or used a prescription medication for non-medical reasons? Never Filed at: 07/02/2025 1546                            History of Present Illness       Chief Complaint   Patient presents with    Follow Up Rabies     3rd rabies vaccine       Past Medical History[1]   Past Surgical History[2]   Family History[3]   Social History[4]   E-Cigarette/Vaping    E-Cigarette Use Current Every Day User     Comments Pt \"vapes\" daily       E-Cigarette/Vaping Substances    Nicotine Yes     THC Yes     CBD No     Flavoring Yes     Other No     Unknown No       I have reviewed and agree with the history as documented.     Here for rabies vaccine.  No complaints.      History provided by:  " Patient   used: No    Medical Problem  Location:  Here for third rabies vaccine.  No complaints  Associated symptoms: no abdominal pain, no chest pain, no cough, no diarrhea, no ear pain, no fever, no headaches, no myalgias, no nausea, no rash, no rhinorrhea, no shortness of breath, no sore throat, no vomiting and no wheezing        Review of Systems   Constitutional:  Negative for chills and fever.   HENT:  Negative for ear pain, hearing loss, rhinorrhea, sore throat, trouble swallowing and voice change.    Eyes:  Negative for pain and discharge.   Respiratory:  Negative for cough, shortness of breath and wheezing.    Cardiovascular:  Negative for chest pain and palpitations.   Gastrointestinal:  Negative for abdominal pain, blood in stool, constipation, diarrhea, nausea and vomiting.   Genitourinary:  Negative for dysuria, flank pain, frequency and hematuria.   Musculoskeletal:  Negative for joint swelling, myalgias, neck pain and neck stiffness.   Skin:  Negative for rash and wound.   Neurological:  Negative for dizziness, seizures, syncope, facial asymmetry and headaches.   Psychiatric/Behavioral:  Negative for hallucinations, self-injury and suicidal ideas.    All other systems reviewed and are negative.          Objective       ED Triage Vitals [07/02/25 1543]   Temperature Pulse BP Resp SpO2 Patient Position - Orthostatic VS   97.7 °F (36.5 °C) -- -- -- -- --      Temp Source Heart Rate Source BP Location FiO2 (%) Pain Score    Temporal -- -- -- --      Vitals      Date and Time Temp Pulse SpO2 Resp BP Pain Score FACES Pain Rating User   07/02/25 1543 97.7 °F (36.5 °C) -- -- -- -- -- -- SL            Physical Exam  Constitutional:       General: She is not in acute distress.     Appearance: Normal appearance. She is not ill-appearing.   HENT:      Head: Normocephalic and atraumatic.      Right Ear: External ear normal.      Left Ear: External ear normal.      Nose: Nose normal.       Mouth/Throat:      Mouth: Mucous membranes are moist.     Eyes:      Extraocular Movements: Extraocular movements intact.      Pupils: Pupils are equal, round, and reactive to light.       Cardiovascular:      Rate and Rhythm: Normal rate and regular rhythm.   Pulmonary:      Effort: Pulmonary effort is normal. No respiratory distress.      Breath sounds: Normal breath sounds.   Abdominal:      General: Abdomen is flat. Bowel sounds are normal. There is no distension.      Palpations: Abdomen is soft.      Tenderness: There is no abdominal tenderness.     Musculoskeletal:         General: No swelling or tenderness.      Cervical back: Normal range of motion and neck supple.      Comments: Left hand with healed wound.  No surrounding erythema warmth swelling or fluctuance.    Left popliteal region with small bruise noted.  No surrounding erythema warmth swelling or fluctuance.     Skin:     General: Skin is warm and dry.      Capillary Refill: Capillary refill takes less than 2 seconds.     Neurological:      General: No focal deficit present.      Mental Status: She is alert and oriented to person, place, and time.     Psychiatric:         Mood and Affect: Mood normal.         Behavior: Behavior normal.         Results Reviewed       None            No orders to display       Procedures    ED Medication and Procedure Management   Prior to Admission Medications   Prescriptions Last Dose Informant Patient Reported? Taking?   Multiple Vitamin (multivitamin) capsule   Yes No   Sig: Take 1 capsule by mouth daily   QUEtiapine (SEROquel) 200 mg tablet  Self Yes No   Sig: Take 100 mg by mouth daily at bedtime   acetaminophen (TYLENOL) 500 mg tablet   Yes No   Sig: Take 1,000 mg by mouth every 6 (six) hours as needed for mild pain   albuterol (2.5 mg/3 mL) 0.083 % nebulizer solution   No No   Sig: Take 3 mL (2.5 mg total) by nebulization every 4 (four) hours as needed for wheezing or shortness of breath   albuterol  (PROVENTIL HFA,VENTOLIN HFA) 90 mcg/act inhaler   Yes No   Sig: Inhale 2 puffs every 6 (six) hours as needed for wheezing   amoxicillin-clavulanate (AUGMENTIN) 875-125 mg per tablet   No No   Sig: Take 1 tablet by mouth every 12 (twelve) hours for 7 days   budesonide-formoterol (SYMBICORT) 80-4.5 MCG/ACT inhaler   Yes No   Sig: Inhale 2 puffs daily    citalopram (CeleXA) 20 mg tablet  Self Yes No   Sig: Take 40 mg by mouth daily    clonazePAM (KlonoPIN) 0.5 mg tablet   Yes No   Sig: Take 1 tablet by mouth 2 (two) times a day as needed   levothyroxine 112 mcg tablet   Yes No   Sig: Take 112 mcg by mouth daily   methylPREDNISolone 4 MG tablet therapy pack   No No   Sig: Use as directed on package   norgestimate-ethinyl estradiol (ORTHO-CYCLEN) 0.25-35 MG-MCG per tablet   Yes No   Sig: Take 1 tablet by mouth daily      Facility-Administered Medications: None     Patient's Medications   Discharge Prescriptions    No medications on file     No discharge procedures on file.  ED SEPSIS DOCUMENTATION   Time reflects when diagnosis was documented in both MDM as applicable and the Disposition within this note       Time User Action Codes Description Comment    7/2/2025  3:41 PM Wesley Linda Add [Z23] Encounter for repeat administration of rabies vaccination                    [1]   Past Medical History:  Diagnosis Date    Arthritis     Pt reports in right knee    Asthma     Bipolar 1 disorder (HCC)     Chronic pain disorder     Chronic pain in lower back.    COVID-19 03/2021    Pt reports having flu-like symptoms.    Depression     Disease of thyroid gland     Lipoma     Pt reports lump behind right ear    Nerve disorder     Pet allergy     Seasonal allergies    [2]   Past Surgical History:  Procedure Laterality Date    BACK SURGERY Left 2019    laminectomy    KNEE ARTHROSCOPY Right     KNEE ARTHROSCOPY W/ ACL RECONSTRUCTION      ORTHOPEDIC SURGERY      [place] ARTHROSCOPY KNEE SYNOVECTOMY 2/>COMPARTMENTS Right 09/16/2019     Procedure: ARTHROSCOPY KNEE  arthroscopy of right knee 1st, mini open right MPFL reconstruction with allograft;  Surgeon: Ced Gan MD;  Location: [facility];  Service: Orthopedics    [place] EXCISION TUMOR SOFT TISS FACE/SCALP SUBQ 2 CM/> Right 09/27/2021    Procedure: BEHIND EAR MASS EXCISION;  Surgeon: Princess Leal DO;  Location:  MAIN OR;  Service: General    REVISION OF SCAR Right 11/18/2019    Procedure: REVISION SCAR EXTREMITY;  Surgeon: Ced Gan MD;  Location: [facility];  Service: Orthopedics    WISDOM TOOTH EXTRACTION     [3]   Family History  Problem Relation Name Age of Onset    Pneumonia Mother      Multiple sclerosis Father     [4]   Social History  Tobacco Use    Smoking status: Former     Types: E-Cigarettes     Passive exposure: Past    Smokeless tobacco: Current    Tobacco comments:     vapes   Vaping Use    Vaping status: Every Day    Substances: Nicotine, THC, Flavoring   Substance Use Topics    Alcohol use: Not Currently    Drug use: Yes     Types: Marijuana     Comment: Pt uses daily for pain management        Wesley Linda MD  07/02/25 9030

## 2025-07-14 ENCOUNTER — HOSPITAL ENCOUNTER (EMERGENCY)
Facility: HOSPITAL | Age: 30
Discharge: HOME | End: 2025-07-14
Attending: EMERGENCY MEDICINE | Admitting: EMERGENCY MEDICINE
Payer: COMMERCIAL

## 2025-07-14 VITALS
SYSTOLIC BLOOD PRESSURE: 132 MMHG | RESPIRATION RATE: 16 BRPM | HEART RATE: 70 BPM | WEIGHT: 208 LBS | DIASTOLIC BLOOD PRESSURE: 84 MMHG | TEMPERATURE: 97.4 F | BODY MASS INDEX: 35.7 KG/M2 | OXYGEN SATURATION: 99 %

## 2025-07-14 DIAGNOSIS — Z23 ENCOUNTER FOR REPEAT ADMINISTRATION OF RABIES VACCINATION: Primary | ICD-10-CM

## 2025-07-14 PROCEDURE — 90675 RABIES VACCINE IM: CPT | Performed by: PHYSICIAN ASSISTANT

## 2025-07-14 PROCEDURE — 90471 IMMUNIZATION ADMIN: CPT

## 2025-07-14 PROCEDURE — 99283 EMERGENCY DEPT VISIT LOW MDM: CPT | Performed by: PHYSICIAN ASSISTANT

## 2025-07-14 RX ADMIN — Medication 1 ML: at 13:36

## 2025-07-14 NOTE — DISCHARGE INSTRUCTIONS
This was your last dose of your rabies vaccine. Please follow up with PCP and return with new or worsening symptoms

## 2025-07-14 NOTE — ED PROVIDER NOTES
Time reflects when diagnosis was documented in both MDM as applicable and the Disposition within this note       Time User Action Codes Description Comment    7/14/2025  1:26 PM Anne Lakhani Add [Z23] Encounter for repeat administration of rabies vaccination           ED Disposition       ED Disposition   Discharge    Condition   Stable    Date/Time   Mon Jul 14, 2025  1:26 PM    Comment   Meka Leonardo discharge to home/self care.                   Assessment & Plan       Medical Decision Making  29-year-old female presented to the emergency department for repeat rabies vaccination.  Vitals and medical record reviewed.  Wounds appear to be well-healing with no signs of infection.  Patient has been tolerating vaccine well without any difficulty.  We discussed return precautions and follow-up and she verbalized understanding.  She is clinically hemodynamically stable for discharge    Problems Addressed:  Encounter for repeat administration of rabies vaccination: acute illness or injury    Risk  Prescription drug management.             Medications   rabies vaccine, human diploid IM injection 1 mL (has no administration in time range)       ED Risk Strat Scores                    No data recorded        SBIRT 20yo+      Flowsheet Row Most Recent Value   Initial Alcohol Screen: US AUDIT-C     1. How often do you have a drink containing alcohol? 0 Filed at: 07/14/2025 1323   2. How many drinks containing alcohol do you have on a typical day you are drinking?  0 Filed at: 07/14/2025 1323   3b. FEMALE Any Age, or MALE 65+: How often do you have 4 or more drinks on one occassion? 0 Filed at: 07/14/2025 1323   Audit-C Score 0 Filed at: 07/14/2025 1323   MARLENE: How many times in the past year have you...    Used an illegal drug or used a prescription medication for non-medical reasons? Never Filed at: 07/14/2025 1323                            History of Present Illness       Chief Complaint   Patient presents with     "Follow Up Rabies     Patient presents to the ED requesting last dose of rabies vaccine.        Past Medical History[1]   Past Surgical History[2]   Family History[3]   Social History[4]   E-Cigarette/Vaping    E-Cigarette Use Current Every Day User     Comments Pt \"vapes\" daily       E-Cigarette/Vaping Substances    Nicotine Yes     THC Yes     CBD No     Flavoring Yes     Other No     Unknown No       I have reviewed and agree with the history as documented.     29-year-old female presents to the emergency department for repeat rabies vaccination.  She was bit by a neighbors dog on the 25th of last month.  This is her fourth vaccine.  Patient states wounds are well-healing.  She denies any redness around either wound.  Wound on her left hand completely resolved.  Some mild bruising around the wound site behind the left knee.  Patient states she has been tolerating vaccines without any difficulty.        Review of Systems   Respiratory: Negative.     Cardiovascular: Negative.    Skin:  Positive for color change and wound.   Neurological: Negative.    All other systems reviewed and are negative.          Objective       ED Triage Vitals [07/14/25 1322]   Temperature Pulse Blood Pressure Respirations SpO2 Patient Position - Orthostatic VS   (!) 97.4 °F (36.3 °C) 70 132/84 16 99 % Sitting      Temp Source Heart Rate Source BP Location FiO2 (%) Pain Score    Temporal Monitor Left arm -- No Pain      Vitals      Date and Time Temp Pulse SpO2 Resp BP Pain Score FACES Pain Rating User   07/14/25 1322 97.4 °F (36.3 °C) 70 99 % 16 132/84 No Pain -- RR            Physical Exam  Vitals and nursing note reviewed.   Constitutional:       General: She is not in acute distress.     Appearance: Normal appearance. She is not ill-appearing, toxic-appearing or diaphoretic.   HENT:      Head: Normocephalic.     Eyes:      Pupils: Pupils are equal, round, and reactive to light.     Pulmonary:      Effort: Pulmonary effort is normal. "     Musculoskeletal:         General: Normal range of motion.     Skin:     General: Skin is warm and dry.      Findings: Bruising (behind left knee) present. No erythema.     Neurological:      General: No focal deficit present.      Mental Status: She is alert.         Results Reviewed       None            No orders to display       Procedures    ED Medication and Procedure Management   Prior to Admission Medications   Prescriptions Last Dose Informant Patient Reported? Taking?   Multiple Vitamin (multivitamin) capsule   Yes No   Sig: Take 1 capsule by mouth daily   QUEtiapine (SEROquel) 200 mg tablet  Self Yes No   Sig: Take 100 mg by mouth daily at bedtime   acetaminophen (TYLENOL) 500 mg tablet   Yes No   Sig: Take 1,000 mg by mouth every 6 (six) hours as needed for mild pain   albuterol (2.5 mg/3 mL) 0.083 % nebulizer solution   No No   Sig: Take 3 mL (2.5 mg total) by nebulization every 4 (four) hours as needed for wheezing or shortness of breath   albuterol (PROVENTIL HFA,VENTOLIN HFA) 90 mcg/act inhaler   Yes No   Sig: Inhale 2 puffs every 6 (six) hours as needed for wheezing   budesonide-formoterol (SYMBICORT) 80-4.5 MCG/ACT inhaler   Yes No   Sig: Inhale 2 puffs daily    citalopram (CeleXA) 20 mg tablet  Self Yes No   Sig: Take 40 mg by mouth daily    clonazePAM (KlonoPIN) 0.5 mg tablet   Yes No   Sig: Take 1 tablet by mouth 2 (two) times a day as needed   levothyroxine 112 mcg tablet   Yes No   Sig: Take 112 mcg by mouth daily   methylPREDNISolone 4 MG tablet therapy pack   No No   Sig: Use as directed on package   norgestimate-ethinyl estradiol (ORTHO-CYCLEN) 0.25-35 MG-MCG per tablet   Yes No   Sig: Take 1 tablet by mouth daily      Facility-Administered Medications: None     Patient's Medications   Discharge Prescriptions    No medications on file     No discharge procedures on file.  ED SEPSIS DOCUMENTATION   Time reflects when diagnosis was documented in both MDM as applicable and the Disposition  within this note       Time User Action Codes Description Comment    7/14/2025  1:26 PM Anne Lakhani Add [Z23] Encounter for repeat administration of rabies vaccination                      [1]   Past Medical History:  Diagnosis Date    Arthritis     Pt reports in right knee    Asthma     Bipolar 1 disorder (HCC)     Chronic pain disorder     Chronic pain in lower back.    COVID-19 03/2021    Pt reports having flu-like symptoms.    Depression     Disease of thyroid gland     Lipoma     Pt reports lump behind right ear    Nerve disorder     Pet allergy     Seasonal allergies    [2]   Past Surgical History:  Procedure Laterality Date    BACK SURGERY Left 2019    laminectomy    KNEE ARTHROSCOPY Right     KNEE ARTHROSCOPY W/ ACL RECONSTRUCTION      ORTHOPEDIC SURGERY      [place] ARTHROSCOPY KNEE SYNOVECTOMY 2/>COMPARTMENTS Right 09/16/2019    Procedure: ARTHROSCOPY KNEE  arthroscopy of right knee 1st, mini open right MPFL reconstruction with allograft;  Surgeon: Ced Gan MD;  Location: [facility];  Service: Orthopedics    [place] EXCISION TUMOR SOFT TISS FACE/SCALP SUBQ 2 CM/> Right 09/27/2021    Procedure: BEHIND EAR MASS EXCISION;  Surgeon: Princess Leal DO;  Location:  MAIN OR;  Service: General    REVISION OF SCAR Right 11/18/2019    Procedure: REVISION SCAR EXTREMITY;  Surgeon: Ced Gan MD;  Location: [facility];  Service: Orthopedics    WISDOM TOOTH EXTRACTION     [3]   Family History  Problem Relation Name Age of Onset    Pneumonia Mother      Multiple sclerosis Father     [4]   Social History  Tobacco Use    Smoking status: Former     Types: E-Cigarettes     Passive exposure: Past    Smokeless tobacco: Current    Tobacco comments:     vapes   Vaping Use    Vaping status: Every Day    Substances: Nicotine, THC, Flavoring   Substance Use Topics    Alcohol use: Not Currently    Drug use: Yes     Types: Marijuana     Comment: Pt uses daily for pain management        Anne Lakhani PA-C  07/14/25 7119     no

## (undated) DEVICE — ACE WRAP 4 IN STERILE

## (undated) DEVICE — SUT ETHILON 3-0 PS-1 18 IN 1663H

## (undated) DEVICE — GLOVE SRG BIOGEL ECLIPSE 7

## (undated) DEVICE — MAT FLOOR STEP DRI 24 X 36 IN N-STRL

## (undated) DEVICE — SUT VICRYL 2-0 SH 27 IN UNDYED J417H

## (undated) DEVICE — GLOVE INDICATOR PI UNDERGLOVE SZ 8 BLUE

## (undated) DEVICE — ABDOMINAL PAD: Brand: DERMACEA

## (undated) DEVICE — 3M™ IOBAN™ 2 ANTIMICROBIAL INCISE DRAPE 6648EZ: Brand: IOBAN™ 2

## (undated) DEVICE — OCCLUSIVE GAUZE STRIP,3% BISMUTH TRIBROMOPHENATE IN PETROLATUM BLEND: Brand: XEROFORM

## (undated) DEVICE — SUT FIBERWIRE #2 1/2 CIRCLE T-5 38IN AR-7200

## (undated) DEVICE — PADDING CAST 6IN COTTON STRL

## (undated) DEVICE — 3M™ STERI-STRIP™ REINFORCED ADHESIVE SKIN CLOSURES, R1547, 1/2 IN X 4 IN (12 MM X 100 MM), 6 STRIPS/ENVELOPE: Brand: 3M™ STERI-STRIP™

## (undated) DEVICE — NEEDLE 21 G X 1

## (undated) DEVICE — ACE WRAP 6 IN STERILE

## (undated) DEVICE — PAD GROUNDING ADULT

## (undated) DEVICE — SYRINGE 10ML LL

## (undated) DEVICE — CURITY STRETCH BANDAGE: Brand: CURITY

## (undated) DEVICE — BANDAGE, ESMARK LF STR 6"X9' (20/CS): Brand: CYPRESS

## (undated) DEVICE — PLUMEPEN PRO 10FT

## (undated) DEVICE — MEDI-VAC YANKAUER SUCTION HANDLE W/STRAIGHT TIP & CONTROL VENT: Brand: CARDINAL HEALTH

## (undated) DEVICE — SUT VICRYL 3-0 SH 27 IN J416H

## (undated) DEVICE — ELECTRODE ELECSURG SUPER TURBOVAC 90 3.75MM X 5.4 IN

## (undated) DEVICE — BETHLEHEM UNIVERSAL MINOR GEN: Brand: CARDINAL HEALTH

## (undated) DEVICE — NEEDLE 27 G X 1 1/4

## (undated) DEVICE — TRANSPOSAL ULTRAFLEX DUO/QUAD ULTRA CART MANIFOLD

## (undated) DEVICE — CUFF TOURNIQUET 30 X 4 IN QUICK CONNECT DISP 1BLA

## (undated) DEVICE — SYRINGE 20ML LL

## (undated) DEVICE — SUT MONOCRYL 4-0 PS-2 27 IN Y426H

## (undated) DEVICE — TUBING SUCTION 5MM X 12 FT

## (undated) DEVICE — INTENDED FOR TISSUE SEPARATION, AND OTHER PROCEDURES THAT REQUIRE A SHARP SURGICAL BLADE TO PUNCTURE OR CUT.: Brand: BARD-PARKER ® CARBON RIB-BACK BLADES

## (undated) DEVICE — ADHESIVE SKIN HIGH VISCOSITY EXOFIN 1ML

## (undated) DEVICE — GLOVE SRG BIOGEL 8

## (undated) DEVICE — WEBRIL 6 IN UNSTERILE

## (undated) DEVICE — 3M™ STERI-DRAPE™ U-DRAPE 1015: Brand: STERI-DRAPE™

## (undated) DEVICE — GLOVE SRG BIOGEL ECLIPSE 8

## (undated) DEVICE — SPONGE LAP 18 X 18 IN

## (undated) DEVICE — INTENT TO BE USED WITH SUTURE MATERIAL FOR TISSUE CLOSURE: Brand: RICHARD-ALLAN®  NEEDLE 1/2 CIRCLE REVERSE CUTTING

## (undated) DEVICE — IMPERVIOUS STOCKINETTE: Brand: DEROYAL

## (undated) DEVICE — BETHLEHEM UNIVERSAL  MIONR EXT: Brand: CARDINAL HEALTH

## (undated) DEVICE — INTENDED FOR TISSUE SEPARATION, AND OTHER PROCEDURES THAT REQUIRE A SHARP SURGICAL BLADE TO PUNCTURE OR CUT.: Brand: BARD-PARKER SAFETY BLADES SIZE 15, STERILE

## (undated) DEVICE — DRAPE C-ARM X-RAY

## (undated) DEVICE — SURGI KIT INSTRUMENT ORGANIZER

## (undated) DEVICE — COBAN 4 IN STERILE

## (undated) DEVICE — GAUZE SPONGES,16 PLY: Brand: CURITY

## (undated) DEVICE — 3000CC GUARDIAN II: Brand: GUARDIAN

## (undated) DEVICE — U-DRAPE: Brand: CONVERTORS

## (undated) DEVICE — SUT FIBERWIRE 2-0 3/8 CIRCLE T-13 18 IN AR-7220

## (undated) DEVICE — 3M™ TEGADERM™ TRANSPARENT FILM DRESSING FRAME STYLE, 1626W, 4 IN X 4-3/4 IN (10 CM X 12 CM), 50/CT 4CT/CASE: Brand: 3M™ TEGADERM™

## (undated) DEVICE — CHLORAPREP HI-LITE 10.5ML ORANGE

## (undated) DEVICE — IMMOBILIZER KNEE UNIVERSAL 19 IN

## (undated) DEVICE — SUT VICRYL 0 CT-1 36 IN J946H

## (undated) DEVICE — GAUZE SPONGES,USP TYPE VII GAUZE, 12 PLY: Brand: CURITY

## (undated) DEVICE — IMMOBILIZER KNEE UNIVERSAL 22 IN

## (undated) DEVICE — CHLORAPREP HI-LITE 26ML ORANGE

## (undated) DEVICE — TUBING ARTHROSCOPIC WAVE  MAIN PUMP

## (undated) DEVICE — 10FR FRAZIER SUCTION HANDLE: Brand: CARDINAL HEALTH

## (undated) DEVICE — ACE WRAP 6 IN UNSTERILE

## (undated) DEVICE — NEEDLE 25G X 1 1/2

## (undated) DEVICE — BLADE SHAVER EXCALIBUR 4MM 13CM COOLCUT

## (undated) DEVICE — GLOVE INDICATOR PI UNDERGLOVE SZ 7 BLUE

## (undated) DEVICE — ARTHROCARE WAND MULTIVAC 50 DEGREE

## (undated) DEVICE — NEEDLE SPINAL18G X 3.5 IN QUINCKE

## (undated) DEVICE — SUT VICRYL 4-0 PS-2 27 IN J426H

## (undated) DEVICE — DRAPE C-ARMOUR